# Patient Record
Sex: MALE | Race: WHITE | NOT HISPANIC OR LATINO
[De-identification: names, ages, dates, MRNs, and addresses within clinical notes are randomized per-mention and may not be internally consistent; named-entity substitution may affect disease eponyms.]

---

## 2017-01-09 ENCOUNTER — RX RENEWAL (OUTPATIENT)
Age: 72
End: 2017-01-09

## 2017-01-09 ENCOUNTER — APPOINTMENT (OUTPATIENT)
Dept: INTERNAL MEDICINE | Facility: CLINIC | Age: 72
End: 2017-01-09

## 2017-01-09 ENCOUNTER — NON-APPOINTMENT (OUTPATIENT)
Age: 72
End: 2017-01-09

## 2017-01-09 VITALS
HEART RATE: 70 BPM | RESPIRATION RATE: 16 BRPM | BODY MASS INDEX: 28.25 KG/M2 | DIASTOLIC BLOOD PRESSURE: 75 MMHG | HEIGHT: 67 IN | SYSTOLIC BLOOD PRESSURE: 130 MMHG | WEIGHT: 180 LBS

## 2017-01-09 DIAGNOSIS — R94.31 ABNORMAL ELECTROCARDIOGRAM [ECG] [EKG]: ICD-10-CM

## 2017-01-09 DIAGNOSIS — Z80.7 FAMILY HISTORY OF OTHER MALIGNANT NEOPLASMS OF LYMPHOID, HEMATOPOIETIC AND RELATED TISSUES: ICD-10-CM

## 2017-01-09 DIAGNOSIS — Z82.49 FAMILY HISTORY OF ISCHEMIC HEART DISEASE AND OTHER DISEASES OF THE CIRCULATORY SYSTEM: ICD-10-CM

## 2017-01-09 DIAGNOSIS — Z81.8 FAMILY HISTORY OF OTHER MENTAL AND BEHAVIORAL DISORDERS: ICD-10-CM

## 2017-01-09 DIAGNOSIS — Z80.1 FAMILY HISTORY OF MALIGNANT NEOPLASM OF TRACHEA, BRONCHUS AND LUNG: ICD-10-CM

## 2017-01-09 DIAGNOSIS — Z84.89 FAMILY HISTORY OF OTHER SPECIFIED CONDITIONS: ICD-10-CM

## 2017-01-10 LAB
HBA1C MFR BLD HPLC: 5.4 %
PROLACTIN SERPL-MCNC: 5.2 NG/ML

## 2017-01-13 ENCOUNTER — CLINICAL ADVICE (OUTPATIENT)
Age: 72
End: 2017-01-13

## 2017-01-13 DIAGNOSIS — E29.1 TESTICULAR HYPOFUNCTION: ICD-10-CM

## 2017-01-13 LAB
TESTOST BND SERPL-MCNC: 3.7 PG/ML
TESTOST SERPL-MCNC: 316.3 NG/DL

## 2017-02-16 DIAGNOSIS — J31.0 CHRONIC RHINITIS: ICD-10-CM

## 2017-02-27 ENCOUNTER — RX RENEWAL (OUTPATIENT)
Age: 72
End: 2017-02-27

## 2017-02-28 ENCOUNTER — RX RENEWAL (OUTPATIENT)
Age: 72
End: 2017-02-28

## 2017-03-02 ENCOUNTER — RX RENEWAL (OUTPATIENT)
Age: 72
End: 2017-03-02

## 2017-05-08 ENCOUNTER — RESULT REVIEW (OUTPATIENT)
Age: 72
End: 2017-05-08

## 2018-08-10 ENCOUNTER — MEDICATION RENEWAL (OUTPATIENT)
Age: 73
End: 2018-08-10

## 2018-08-11 LAB
25(OH)D3 SERPL-MCNC: 33.3 NG/ML
ALBUMIN SERPL ELPH-MCNC: 5.1 G/DL
ALP BLD-CCNC: 77 U/L
ALT SERPL-CCNC: 29 U/L
ANION GAP SERPL CALC-SCNC: 17 MMOL/L
APPEARANCE: CLEAR
AST SERPL-CCNC: 21 U/L
BASOPHILS # BLD AUTO: 0.02 K/UL
BASOPHILS NFR BLD AUTO: 0.4 %
BILIRUB SERPL-MCNC: 0.7 MG/DL
BILIRUBIN URINE: NEGATIVE
BLOOD URINE: NEGATIVE
BUN SERPL-MCNC: 26 MG/DL
CALCIUM SERPL-MCNC: 9.6 MG/DL
CHLORIDE SERPL-SCNC: 102 MMOL/L
CHOLEST SERPL-MCNC: 160 MG/DL
CHOLEST/HDLC SERPL: 4.4 RATIO
CO2 SERPL-SCNC: 23 MMOL/L
COLOR: YELLOW
CREAT SERPL-MCNC: 1.36 MG/DL
EOSINOPHIL # BLD AUTO: 0.08 K/UL
EOSINOPHIL NFR BLD AUTO: 1.4 %
GLUCOSE QUALITATIVE U: NEGATIVE MG/DL
GLUCOSE SERPL-MCNC: 97 MG/DL
HCT VFR BLD CALC: 45.2 %
HDLC SERPL-MCNC: 36 MG/DL
HGB BLD-MCNC: 15 G/DL
IMM GRANULOCYTES NFR BLD AUTO: 0.7 %
KETONES URINE: NEGATIVE
LDLC SERPL CALC-MCNC: 73 MG/DL
LEUKOCYTE ESTERASE URINE: NEGATIVE
LYMPHOCYTES # BLD AUTO: 1.49 K/UL
LYMPHOCYTES NFR BLD AUTO: 27 %
MAN DIFF?: NORMAL
MCHC RBC-ENTMCNC: 29.2 PG
MCHC RBC-ENTMCNC: 33.2 GM/DL
MCV RBC AUTO: 88.1 FL
MONOCYTES # BLD AUTO: 0.46 K/UL
MONOCYTES NFR BLD AUTO: 8.3 %
NEUTROPHILS # BLD AUTO: 3.43 K/UL
NEUTROPHILS NFR BLD AUTO: 62.2 %
NITRITE URINE: NEGATIVE
PH URINE: 6.5
PLATELET # BLD AUTO: 181 K/UL
POTASSIUM SERPL-SCNC: 4.7 MMOL/L
PROT SERPL-MCNC: 7.1 G/DL
PROTEIN URINE: NEGATIVE MG/DL
PSA SERPL-MCNC: 5.52 NG/ML
RBC # BLD: 5.13 M/UL
RBC # FLD: 13.8 %
SODIUM SERPL-SCNC: 142 MMOL/L
SPECIFIC GRAVITY URINE: 1.02
T4 SERPL-MCNC: 7.2 UG/DL
TRIGL SERPL-MCNC: 254 MG/DL
TSH SERPL-ACNC: 2.53 UIU/ML
UROBILINOGEN URINE: NEGATIVE MG/DL
WBC # FLD AUTO: 5.52 K/UL

## 2018-08-13 ENCOUNTER — APPOINTMENT (OUTPATIENT)
Dept: INTERNAL MEDICINE | Facility: CLINIC | Age: 73
End: 2018-08-13
Payer: COMMERCIAL

## 2018-08-13 ENCOUNTER — NON-APPOINTMENT (OUTPATIENT)
Age: 73
End: 2018-08-13

## 2018-08-13 VITALS
BODY MASS INDEX: 29.03 KG/M2 | SYSTOLIC BLOOD PRESSURE: 130 MMHG | WEIGHT: 185 LBS | HEIGHT: 67 IN | HEART RATE: 72 BPM | RESPIRATION RATE: 16 BRPM | DIASTOLIC BLOOD PRESSURE: 80 MMHG

## 2018-08-13 DIAGNOSIS — Z23 ENCOUNTER FOR IMMUNIZATION: ICD-10-CM

## 2018-08-13 PROCEDURE — 36415 COLL VENOUS BLD VENIPUNCTURE: CPT

## 2018-08-13 PROCEDURE — 99397 PER PM REEVAL EST PAT 65+ YR: CPT | Mod: 25

## 2018-08-13 PROCEDURE — 94010 BREATHING CAPACITY TEST: CPT

## 2018-08-13 PROCEDURE — 90732 PPSV23 VACC 2 YRS+ SUBQ/IM: CPT

## 2018-08-13 PROCEDURE — 99497 ADVNCD CARE PLAN 30 MIN: CPT | Mod: 25

## 2018-08-13 PROCEDURE — 93000 ELECTROCARDIOGRAM COMPLETE: CPT

## 2018-08-13 PROCEDURE — G0009: CPT

## 2018-08-13 PROCEDURE — 99213 OFFICE O/P EST LOW 20 MIN: CPT | Mod: 25

## 2018-08-13 RX ORDER — ERGOCALCIFEROL (VITAMIN D2) 10 MCG
TABLET ORAL DAILY
Refills: 0 | Status: ACTIVE | COMMUNITY
Start: 2018-08-13

## 2018-08-13 RX ORDER — CHLORHEXIDINE GLUCONATE 4 %
1000 LIQUID (ML) TOPICAL DAILY
Refills: 0 | Status: ACTIVE | COMMUNITY
Start: 2018-08-13

## 2018-08-13 RX ORDER — PYRIDOXINE HCL (VITAMIN B6) 50 MG
50 TABLET ORAL DAILY
Refills: 0 | Status: ACTIVE | COMMUNITY
Start: 2018-08-13

## 2018-08-13 NOTE — HEALTH RISK ASSESSMENT
[Good] : ~his/her~  mood as  good [No falls in past year] : Patient reported no falls in the past year [None] : None [With Family] : lives with family [Employed] : employed [Graduate School] : graduate school [] :  [Feels Safe at Home] : Feels safe at home [Fully functional (bathing, dressing, toileting, transferring, walking, feeding)] : Fully functional (bathing, dressing, toileting, transferring, walking, feeding) [Fully functional (using the telephone, shopping, preparing meals, housekeeping, doing laundry, using] : Fully functional and needs no help or supervision to perform IADLs (using the telephone, shopping, preparing meals, housekeeping, doing laundry, using transportation, managing medications and managing finances) [Smoke Detector] : smoke detector [Seat Belt] :  uses seat belt [Sunscreen] : uses sunscreen [Discussed at today's visit] : Advance Directives Discussed at today's visit [] : No [de-identified] : Socially [Change in mental status noted] : No change in mental status noted [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

## 2018-08-13 NOTE — HISTORY OF PRESENT ILLNESS
[FreeTextEntry1] : Well visit and fu of\par Hypertension  - on meds\par Hyperlipidemia - on meds\par ED  -not taking meds\par

## 2018-08-13 NOTE — REVIEW OF SYSTEMS
[Hearing Loss] : hearing loss [Nocturia] : nocturia [Negative] : Heme/Lymph [FreeTextEntry3] : last optho 6 months  -OK [FreeTextEntry7] : see HPI  - [FreeTextEntry8] : nocturia x 1-2

## 2018-08-13 NOTE — PHYSICAL EXAM
[No Acute Distress] : no acute distress [Well Nourished] : well nourished [Well Developed] : well developed [Well-Appearing] : well-appearing [Normal Sclera/Conjunctiva] : normal sclera/conjunctiva [PERRL] : pupils equal round and reactive to light [EOMI] : extraocular movements intact [Normal Outer Ear/Nose] : the outer ears and nose were normal in appearance [Normal Oropharynx] : the oropharynx was normal [Normal TMs] : both tympanic membranes were normal [No JVD] : no jugular venous distention [Supple] : supple [No Lymphadenopathy] : no lymphadenopathy [Thyroid Normal, No Nodules] : the thyroid was normal and there were no nodules present [No Respiratory Distress] : no respiratory distress  [Clear to Auscultation] : lungs were clear to auscultation bilaterally [No Accessory Muscle Use] : no accessory muscle use [Normal Rate] : normal rate  [Regular Rhythm] : with a regular rhythm [Normal S1, S2] : normal S1 and S2 [No Murmur] : no murmur heard [No Carotid Bruits] : no carotid bruits [No Abdominal Bruit] : a ~M bruit was not heard ~T in the abdomen [No Varicosities] : no varicosities [Pedal Pulses Present] : the pedal pulses are present [No Edema] : there was no peripheral edema [No Extremity Clubbing/Cyanosis] : no extremity clubbing/cyanosis [No Palpable Aorta] : no palpable aorta [Soft] : abdomen soft [Non Tender] : non-tender [Non-distended] : non-distended [No Masses] : no abdominal mass palpated [No HSM] : no HSM [Normal Bowel Sounds] : normal bowel sounds [No Hernias] : no hernias [Normal Sphincter Tone] : normal sphincter tone [No Mass] : no mass [Penis Abnormality] : normal circumcised penis [Testes Tenderness] : no tenderness of the testes [Testes Mass (___cm)] : there were no testicular masses [Prostate Tenderness] : the prostate was not tender [No Prostate Nodules] : no prostate nodules [Prostate Size ___ (0-4)] : prostate size [unfilled] (scale: 0-4) [Normal Supraclavicular Nodes] : no supraclavicular lymphadenopathy [Normal Axillary Nodes] : no axillary lymphadenopathy [Normal Posterior Cervical Nodes] : no posterior cervical lymphadenopathy [Normal Femoral Nodes] : no femoral lymphadenopathy [Normal Anterior Cervical Nodes] : no anterior cervical lymphadenopathy [Normal Inguinal Nodes] : no inguinal lymphadenopathy [No CVA Tenderness] : no CVA  tenderness [No Spinal Tenderness] : no spinal tenderness [No Joint Swelling] : no joint swelling [Grossly Normal Strength/Tone] : grossly normal strength/tone [No Rash] : no rash [Normal Gait] : normal gait [Coordination Grossly Intact] : coordination grossly intact [No Focal Deficits] : no focal deficits [Deep Tendon Reflexes (DTR)] : deep tendon reflexes were 2+ and symmetric [Speech Grossly Normal] : speech grossly normal [Memory Grossly Normal] : memory grossly normal [Normal Affect] : the affect was normal [Alert and Oriented x3] : oriented to person, place, and time [Normal Mood] : the mood was normal [Normal Insight/Judgement] : insight and judgment were intact

## 2018-08-13 NOTE — ASSESSMENT
[FreeTextEntry1] : Pt with above medical issues which requires extra work in addition to the well visit.\par Labs reviewed  -will repeat PSA and do free PSA\par May need to change Valsartan if not available on next renewal\par Health counseling given. \par

## 2018-08-14 LAB
PSA FREE FLD-MCNC: 25.5
PSA FREE SERPL-MCNC: 1.12 NG/ML
PSA SERPL-MCNC: 4.4 NG/ML

## 2018-08-30 ENCOUNTER — INPATIENT (INPATIENT)
Facility: HOSPITAL | Age: 73
LOS: 4 days | Discharge: HOME HEALTH | DRG: 871 | End: 2018-09-04
Attending: INTERNAL MEDICINE | Admitting: STUDENT IN AN ORGANIZED HEALTH CARE EDUCATION/TRAINING PROGRAM
Payer: COMMERCIAL

## 2018-08-30 VITALS
DIASTOLIC BLOOD PRESSURE: 75 MMHG | SYSTOLIC BLOOD PRESSURE: 149 MMHG | RESPIRATION RATE: 22 BRPM | TEMPERATURE: 102 F | HEART RATE: 92 BPM | OXYGEN SATURATION: 94 %

## 2018-08-30 DIAGNOSIS — I10 ESSENTIAL (PRIMARY) HYPERTENSION: ICD-10-CM

## 2018-08-30 DIAGNOSIS — K61.0 ANAL ABSCESS: Chronic | ICD-10-CM

## 2018-08-30 DIAGNOSIS — K52.9 NONINFECTIVE GASTROENTERITIS AND COLITIS, UNSPECIFIED: ICD-10-CM

## 2018-08-30 DIAGNOSIS — A41.9 SEPSIS, UNSPECIFIED ORGANISM: ICD-10-CM

## 2018-08-30 DIAGNOSIS — N18.3 CHRONIC KIDNEY DISEASE, STAGE 3 (MODERATE): ICD-10-CM

## 2018-08-30 DIAGNOSIS — J18.9 PNEUMONIA, UNSPECIFIED ORGANISM: ICD-10-CM

## 2018-08-30 DIAGNOSIS — Z90.49 ACQUIRED ABSENCE OF OTHER SPECIFIED PARTS OF DIGESTIVE TRACT: Chronic | ICD-10-CM

## 2018-08-30 DIAGNOSIS — Z29.9 ENCOUNTER FOR PROPHYLACTIC MEASURES, UNSPECIFIED: ICD-10-CM

## 2018-08-30 DIAGNOSIS — E78.5 HYPERLIPIDEMIA, UNSPECIFIED: ICD-10-CM

## 2018-08-30 LAB
ALBUMIN SERPL ELPH-MCNC: 4.4 G/DL — SIGNIFICANT CHANGE UP (ref 3.3–5)
ALP SERPL-CCNC: 79 U/L — SIGNIFICANT CHANGE UP (ref 40–120)
ALT FLD-CCNC: 75 U/L — HIGH (ref 10–45)
ANION GAP SERPL CALC-SCNC: 16 MMOL/L — SIGNIFICANT CHANGE UP (ref 5–17)
APPEARANCE UR: ABNORMAL
AST SERPL-CCNC: 56 U/L — HIGH (ref 10–40)
BACTERIA # UR AUTO: ABNORMAL /HPF
BASOPHILS # BLD AUTO: 0 K/UL — SIGNIFICANT CHANGE UP (ref 0–0.2)
BASOPHILS NFR BLD AUTO: 0.1 % — SIGNIFICANT CHANGE UP (ref 0–2)
BILIRUB SERPL-MCNC: 1.2 MG/DL — SIGNIFICANT CHANGE UP (ref 0.2–1.2)
BILIRUB UR-MCNC: NEGATIVE — SIGNIFICANT CHANGE UP
BUN SERPL-MCNC: 25 MG/DL — HIGH (ref 7–23)
CALCIUM SERPL-MCNC: 9.7 MG/DL — SIGNIFICANT CHANGE UP (ref 8.4–10.5)
CHLORIDE SERPL-SCNC: 93 MMOL/L — LOW (ref 96–108)
CO2 SERPL-SCNC: 24 MMOL/L — SIGNIFICANT CHANGE UP (ref 22–31)
COLOR SPEC: YELLOW — SIGNIFICANT CHANGE UP
CREAT SERPL-MCNC: 1.78 MG/DL — HIGH (ref 0.5–1.3)
DIFF PNL FLD: ABNORMAL
EOSINOPHIL # BLD AUTO: 0 K/UL — SIGNIFICANT CHANGE UP (ref 0–0.5)
EOSINOPHIL NFR BLD AUTO: 0.1 % — SIGNIFICANT CHANGE UP (ref 0–6)
GAS PNL BLDV: SIGNIFICANT CHANGE UP
GAS PNL BLDV: SIGNIFICANT CHANGE UP
GLUCOSE SERPL-MCNC: 126 MG/DL — HIGH (ref 70–99)
GLUCOSE UR QL: NEGATIVE — SIGNIFICANT CHANGE UP
GRAN CASTS # UR COMP ASSIST: ABNORMAL
HCT VFR BLD CALC: 44.6 % — SIGNIFICANT CHANGE UP (ref 39–50)
HGB BLD-MCNC: 15 G/DL — SIGNIFICANT CHANGE UP (ref 13–17)
KETONES UR-MCNC: NEGATIVE — SIGNIFICANT CHANGE UP
LEUKOCYTE ESTERASE UR-ACNC: NEGATIVE — SIGNIFICANT CHANGE UP
LYMPHOCYTES # BLD AUTO: 1 K/UL — SIGNIFICANT CHANGE UP (ref 1–3.3)
LYMPHOCYTES # BLD AUTO: 8 % — LOW (ref 13–44)
MCHC RBC-ENTMCNC: 28.8 PG — SIGNIFICANT CHANGE UP (ref 27–34)
MCHC RBC-ENTMCNC: 33.7 GM/DL — SIGNIFICANT CHANGE UP (ref 32–36)
MCV RBC AUTO: 85.5 FL — SIGNIFICANT CHANGE UP (ref 80–100)
MONOCYTES # BLD AUTO: 1.2 K/UL — HIGH (ref 0–0.9)
MONOCYTES NFR BLD AUTO: 9.8 % — SIGNIFICANT CHANGE UP (ref 2–14)
NEUTROPHILS # BLD AUTO: 10.4 K/UL — HIGH (ref 1.8–7.4)
NEUTROPHILS NFR BLD AUTO: 81.9 % — HIGH (ref 43–77)
NITRITE UR-MCNC: NEGATIVE — SIGNIFICANT CHANGE UP
PH UR: 6 — SIGNIFICANT CHANGE UP (ref 5–8)
PLATELET # BLD AUTO: 205 K/UL — SIGNIFICANT CHANGE UP (ref 150–400)
POTASSIUM SERPL-MCNC: 4.4 MMOL/L — SIGNIFICANT CHANGE UP (ref 3.5–5.3)
POTASSIUM SERPL-SCNC: 4.4 MMOL/L — SIGNIFICANT CHANGE UP (ref 3.5–5.3)
PROT SERPL-MCNC: 8.3 G/DL — SIGNIFICANT CHANGE UP (ref 6–8.3)
PROT UR-MCNC: 100 MG/DL
RBC # BLD: 5.21 M/UL — SIGNIFICANT CHANGE UP (ref 4.2–5.8)
RBC # FLD: 12.9 % — SIGNIFICANT CHANGE UP (ref 10.3–14.5)
RBC CASTS # UR COMP ASSIST: SIGNIFICANT CHANGE UP /HPF (ref 0–2)
SODIUM SERPL-SCNC: 133 MMOL/L — LOW (ref 135–145)
SP GR SPEC: 1.02 — SIGNIFICANT CHANGE UP (ref 1.01–1.02)
TROPONIN T, HIGH SENSITIVITY RESULT: 14 NG/L — SIGNIFICANT CHANGE UP (ref 0–51)
UROBILINOGEN FLD QL: NEGATIVE — SIGNIFICANT CHANGE UP
WBC # BLD: 12.7 K/UL — HIGH (ref 3.8–10.5)
WBC # FLD AUTO: 12.7 K/UL — HIGH (ref 3.8–10.5)
WBC UR QL: SIGNIFICANT CHANGE UP /HPF (ref 0–5)

## 2018-08-30 PROCEDURE — 71045 X-RAY EXAM CHEST 1 VIEW: CPT | Mod: 26

## 2018-08-30 PROCEDURE — 99254 IP/OBS CNSLTJ NEW/EST MOD 60: CPT | Mod: GC

## 2018-08-30 PROCEDURE — 76705 ECHO EXAM OF ABDOMEN: CPT | Mod: 26,RT

## 2018-08-30 PROCEDURE — 74176 CT ABD & PELVIS W/O CONTRAST: CPT | Mod: 26

## 2018-08-30 PROCEDURE — 74018 RADEX ABDOMEN 1 VIEW: CPT | Mod: 26

## 2018-08-30 PROCEDURE — 99285 EMERGENCY DEPT VISIT HI MDM: CPT

## 2018-08-30 PROCEDURE — 99223 1ST HOSP IP/OBS HIGH 75: CPT | Mod: GC

## 2018-08-30 RX ORDER — MESALAMINE 400 MG
4 TABLET, DELAYED RELEASE (ENTERIC COATED) ORAL
Qty: 0 | Refills: 0 | COMMUNITY

## 2018-08-30 RX ORDER — IPRATROPIUM/ALBUTEROL SULFATE 18-103MCG
3 AEROSOL WITH ADAPTER (GRAM) INHALATION EVERY 6 HOURS
Qty: 0 | Refills: 0 | Status: DISCONTINUED | OUTPATIENT
Start: 2018-08-30 | End: 2018-09-04

## 2018-08-30 RX ORDER — ACETAMINOPHEN 500 MG
650 TABLET ORAL ONCE
Qty: 0 | Refills: 0 | Status: COMPLETED | OUTPATIENT
Start: 2018-08-30 | End: 2018-08-30

## 2018-08-30 RX ORDER — ACETAMINOPHEN 500 MG
650 TABLET ORAL ONCE
Qty: 0 | Refills: 0 | Status: DISCONTINUED | OUTPATIENT
Start: 2018-08-30 | End: 2018-08-30

## 2018-08-30 RX ORDER — SODIUM CHLORIDE 9 MG/ML
1000 INJECTION INTRAMUSCULAR; INTRAVENOUS; SUBCUTANEOUS ONCE
Qty: 0 | Refills: 0 | Status: COMPLETED | OUTPATIENT
Start: 2018-08-30 | End: 2018-08-30

## 2018-08-30 RX ORDER — SODIUM CHLORIDE 9 MG/ML
1000 INJECTION INTRAMUSCULAR; INTRAVENOUS; SUBCUTANEOUS
Qty: 0 | Refills: 0 | Status: DISCONTINUED | OUTPATIENT
Start: 2018-08-30 | End: 2018-08-31

## 2018-08-30 RX ORDER — HEPARIN SODIUM 5000 [USP'U]/ML
5000 INJECTION INTRAVENOUS; SUBCUTANEOUS EVERY 8 HOURS
Qty: 0 | Refills: 0 | Status: DISCONTINUED | OUTPATIENT
Start: 2018-08-30 | End: 2018-08-30

## 2018-08-30 RX ORDER — AZITHROMYCIN 500 MG/1
500 TABLET, FILM COATED ORAL EVERY 24 HOURS
Qty: 0 | Refills: 0 | Status: DISCONTINUED | OUTPATIENT
Start: 2018-08-30 | End: 2018-09-04

## 2018-08-30 RX ORDER — PIPERACILLIN AND TAZOBACTAM 4; .5 G/20ML; G/20ML
3.38 INJECTION, POWDER, LYOPHILIZED, FOR SOLUTION INTRAVENOUS EVERY 8 HOURS
Qty: 0 | Refills: 0 | Status: DISCONTINUED | OUTPATIENT
Start: 2018-08-30 | End: 2018-08-31

## 2018-08-30 RX ORDER — ENOXAPARIN SODIUM 100 MG/ML
30 INJECTION SUBCUTANEOUS DAILY
Qty: 0 | Refills: 0 | Status: DISCONTINUED | OUTPATIENT
Start: 2018-08-30 | End: 2018-09-04

## 2018-08-30 RX ORDER — FAMOTIDINE 10 MG/ML
20 INJECTION INTRAVENOUS ONCE
Qty: 0 | Refills: 0 | Status: COMPLETED | OUTPATIENT
Start: 2018-08-30 | End: 2018-08-30

## 2018-08-30 RX ORDER — KETOROLAC TROMETHAMINE 30 MG/ML
15 SYRINGE (ML) INJECTION ONCE
Qty: 0 | Refills: 0 | Status: DISCONTINUED | OUTPATIENT
Start: 2018-08-30 | End: 2018-08-30

## 2018-08-30 RX ORDER — MESALAMINE 400 MG
400 TABLET, DELAYED RELEASE (ENTERIC COATED) ORAL
Qty: 0 | Refills: 0 | Status: DISCONTINUED | OUTPATIENT
Start: 2018-08-30 | End: 2018-08-30

## 2018-08-30 RX ORDER — ATORVASTATIN CALCIUM 80 MG/1
20 TABLET, FILM COATED ORAL AT BEDTIME
Qty: 0 | Refills: 0 | Status: DISCONTINUED | OUTPATIENT
Start: 2018-08-30 | End: 2018-09-04

## 2018-08-30 RX ORDER — ALLOPURINOL 300 MG
100 TABLET ORAL DAILY
Qty: 0 | Refills: 0 | Status: DISCONTINUED | OUTPATIENT
Start: 2018-08-30 | End: 2018-09-04

## 2018-08-30 RX ORDER — POTASSIUM CITRATE MONOHYDRATE 100 %
10 POWDER (GRAM) MISCELLANEOUS THREE TIMES A DAY
Qty: 0 | Refills: 0 | Status: DISCONTINUED | OUTPATIENT
Start: 2018-08-30 | End: 2018-08-31

## 2018-08-30 RX ORDER — ONDANSETRON 8 MG/1
4 TABLET, FILM COATED ORAL ONCE
Qty: 0 | Refills: 0 | Status: COMPLETED | OUTPATIENT
Start: 2018-08-30 | End: 2018-08-30

## 2018-08-30 RX ORDER — MESALAMINE 400 MG
0.38 TABLET, DELAYED RELEASE (ENTERIC COATED) ORAL
Qty: 0 | Refills: 0 | Status: DISCONTINUED | OUTPATIENT
Start: 2018-08-30 | End: 2018-08-31

## 2018-08-30 RX ORDER — PIPERACILLIN AND TAZOBACTAM 4; .5 G/20ML; G/20ML
3.38 INJECTION, POWDER, LYOPHILIZED, FOR SOLUTION INTRAVENOUS ONCE
Qty: 0 | Refills: 0 | Status: COMPLETED | OUTPATIENT
Start: 2018-08-30 | End: 2018-08-30

## 2018-08-30 RX ORDER — VANCOMYCIN HCL 1 G
1000 VIAL (EA) INTRAVENOUS ONCE
Qty: 0 | Refills: 0 | Status: DISCONTINUED | OUTPATIENT
Start: 2018-08-30 | End: 2018-08-30

## 2018-08-30 RX ADMIN — SODIUM CHLORIDE 1000 MILLILITER(S): 9 INJECTION INTRAMUSCULAR; INTRAVENOUS; SUBCUTANEOUS at 14:25

## 2018-08-30 RX ADMIN — ONDANSETRON 4 MILLIGRAM(S): 8 TABLET, FILM COATED ORAL at 14:53

## 2018-08-30 RX ADMIN — SODIUM CHLORIDE 1000 MILLILITER(S): 9 INJECTION INTRAMUSCULAR; INTRAVENOUS; SUBCUTANEOUS at 15:30

## 2018-08-30 RX ADMIN — FAMOTIDINE 20 MILLIGRAM(S): 10 INJECTION INTRAVENOUS at 14:53

## 2018-08-30 RX ADMIN — Medication 650 MILLIGRAM(S): at 21:10

## 2018-08-30 RX ADMIN — AZITHROMYCIN 250 MILLIGRAM(S): 500 TABLET, FILM COATED ORAL at 21:10

## 2018-08-30 RX ADMIN — Medication 650 MILLIGRAM(S): at 15:00

## 2018-08-30 RX ADMIN — PIPERACILLIN AND TAZOBACTAM 200 GRAM(S): 4; .5 INJECTION, POWDER, LYOPHILIZED, FOR SOLUTION INTRAVENOUS at 16:16

## 2018-08-30 NOTE — ED PROVIDER NOTE - MEDICAL DECISION MAKING DETAILS
PGY1/MD Mariah. 73M well appearing, febrile, tachycardic. Consider infection (colitis, uti, pneumonia), sepsis. Plan for labs, blood cx, cxr, u/a, trop, abx, reassess. PGY1/MD Mariah. 73M well appearing, febrile, tachycardic. Consider infection (colitis, uti, pneumonia), sepsis. Plan for labs, blood cx, cxr, u/a, trop, abx, reassess. ID consultation, likely Legionella pneumonia with GI symptoms or IBD symptoms + colitis, with Bacterial (such as S. Pneumonia, S. Aureus) pneumonia secondarily to virus. Admission for sepsis. PGY1/MD Mariah. 73M well appearing, febrile, tachycardic. Consider infection (colitis, uti, pneumonia), sepsis. Plan for labs, blood cx, cxr, u/a, trop, abx, reassess. ID consultation, likely Legionella pneumonia with GI symptoms or symptomatic IBD due to colitis with Bacterial (such as S. Pneumonia, S. Aureus) pneumonia secondarily to virus. Admission for sepsis. PGY1/MD Mariah. 73M well appearing, febrile, tachycardic. Consider infection (colitis, uti, pneumonia), sepsis. Plan for labs, blood cx, cxr, u/a, trop, abx, reassess. ID consultation, likely Legionella pneumonia with GI symptoms or symptomatic IBD due to colitis with Bacterial (such as S. Pneumonia, S. Aureus) pneumonia secondarily to virus. Admission for sepsis.    72 y/o M presenting with fever, tachycardia and diarrhea. DDx: colitis, gastroenteritis, pneumonia. Will order labs, give IV fluids and reavaluate.  ATTG: Dr. Rosales

## 2018-08-30 NOTE — H&P ADULT - PROBLEM SELECTOR PLAN 1
Standing Duoneb   Incentive Spirometry   Appreciate ID reccs pt met criteria for sepsis, Tm 102.3, Hr 102, RR 22 likely 2/2 RLL/LLL PNA  CXR sig for LLL PNA, CT abd sig RLL  Send blood, urine clx, urine legionella   Continue Zosyn and Azithromycin   Standing Duoneb   Incentive Spirometry   Appreciate ID reccs Pt met criteria for sepsis on admission, Tm 102.3, Hr 102, RR 22 likely 2/2 RLL/LLL PNA  CXR sig for LLL PNA, CT abd sig RLL  Send blood, urine clx, urine legionella   Continue Zosyn and Azithromycin   Standing Duoneb   Incentive Spirometry   Appreciate ID reccs

## 2018-08-30 NOTE — H&P ADULT - NSHPLABSRESULTS_GEN_ALL_CORE
15.0   12.7  )-----------( 205      ( 30 Aug 2018 14:53 )             44.6         133<L>  |  93<L>  |  25<H>  ----------------------------<  126<H>  4.4   |  24  |  1.78<H>    Ca    9.7      30 Aug 2018 14:53    TPro  8.3  /  Alb  4.4  /  TBili  1.2  /  DBili  x   /  AST  56<H>  /  ALT  75<H>  /  AlkPhos  79            LIVER FUNCTIONS - ( 30 Aug 2018 14:53 )  Alb: 4.4 g/dL / Pro: 8.3 g/dL / ALK PHOS: 79 U/L / ALT: 75 U/L / AST: 56 U/L / GGT: x           Urinalysis Basic - ( 30 Aug 2018 16:12 )    Color: Yellow / Appearance: SL Turbid / S.017 / pH: x  Gluc: x / Ketone: Negative  / Bili: Negative / Urobili: Negative   Blood: x / Protein: 100 mg/dL / Nitrite: Negative   Leuk Esterase: Negative / RBC: 3-5 /HPF / WBC 0-2 /HPF   Sq Epi: x / Non Sq Epi: x / Bacteria: Few /HPF    < from: Xray Chest 1 View- PORTABLE-Urgent (18 @ 17:23) >    INTERPRETATION:  left lower lung consolidation, likely pneumonia    < end of copied text >    < from: Xray Abdomen 1 View PORTABLE -Urgent (18 @ 17:27) >    INTERPRETATION:  no emergent findings    < end of copied text > 15.0   12.7  )-----------( 205      ( 30 Aug 2018 14:53 )             44.6         133<L>  |  93<L>  |  25<H>  ----------------------------<  126<H>  4.4   |  24  |  1.78<H>    Ca    9.7      30 Aug 2018 14:53    TPro  8.3  /  Alb  4.4  /  TBili  1.2  /  DBili  x   /  AST  56<H>  /  ALT  75<H>  /  AlkPhos  79            LIVER FUNCTIONS - ( 30 Aug 2018 14:53 )  Alb: 4.4 g/dL / Pro: 8.3 g/dL / ALK PHOS: 79 U/L / ALT: 75 U/L / AST: 56 U/L / GGT: x           Urinalysis Basic - ( 30 Aug 2018 16:12 )    Color: Yellow / Appearance: SL Turbid / S.017 / pH: x  Gluc: x / Ketone: Negative  / Bili: Negative / Urobili: Negative   Blood: x / Protein: 100 mg/dL / Nitrite: Negative   Leuk Esterase: Negative / RBC: 3-5 /HPF / WBC 0-2 /HPF   Sq Epi: x / Non Sq Epi: x / Bacteria: Few /HPF    < from: Xray Chest 1 View- PORTABLE-Urgent (18 @ 17:23) >    INTERPRETATION:  left lower lung consolidation, likely pneumonia    < from: Xray Abdomen 1 View PORTABLE -Urgent (18 @ 17:27) >    INTERPRETATION:  no emergent findings     I personally reviewed & interpreted the lab findings above; CBC c/w leukocytosis with neutrophil predominance. CMP c/w hyponatremia and transaminitis.   I personally reviewed & interpreted the radiographic findings; CXR c/w right lower lobe pneumonia   I personally reviewed & interpreted the EKG findings; T-wave inversion in lateral leads are not new and present on prior EKG from .

## 2018-08-30 NOTE — H&P ADULT - PROBLEM SELECTOR PLAN 2
On initial presentation, pt met criteria for sepsis, Tm 102.3, Hr 102, RR 22 likely 2/2 RLL/LLL PNA  vs intraabdominal infection. Strong suspicion for legionella given constellation findings PNA, diarrhea, mild hyponatremia  F/U blood and urine clx   Send RVP  Send urine legionella  Send GI PCR, C diff   Abx w Zosyn and Azithromycin, low suspicion for MRSA s/p 1 dose Vancomycin   CXR w LLL  CT abd negative for emergent abd findings, however sig RLL PNA On initial presentation, pt met criteria for sepsis, Tm 102.3, Hr 102, RR 22 likely 2/2 RLL/LLL PNA  vs intraabdominal infection. Strong suspicion for legionella given constellation findings PNA, diarrhea, mild hyponatremia  F/U blood and urine clx   Send RVP  Send urine legionella  Send GI PCR, C diff   Abx w Zosyn and Azithromycin, low suspicion for MRSA s/p 1 dose Vancomycin   IVF   CXR w LLL  CT abd negative for emergent abd findings, however sig RLL PNA  Admit to telemetry  Contact isolation

## 2018-08-30 NOTE — ED ADULT NURSE REASSESSMENT NOTE - NS ED NURSE REASSESS COMMENT FT1
Mariah MONTE states d/c vancomycin. Repeat VBG drawn and sent to lab. Pt admitted to medicine with contact precautions. Scheduled for CT at 2000. Pt still febrile to 103F orally, awaiting next available tylenol dose. Pt states he is comfortable.

## 2018-08-30 NOTE — H&P ADULT - ASSESSMENT
73M Pediatric Cardiologist, [facility] HTN, HLD, CKD, IBD on mesalamine, presenting w subjective fevers, chill, diarrhea for 3 days, found to be septic likely 2/2 RLL Pneumonia.

## 2018-08-30 NOTE — ED PROVIDER NOTE - PROGRESS NOTE DETAILS
PCP Dr. Medina verified that Cre on Aug 02 was 1.36. Also called ID 20 min ago, no response yet. Ideal BW is assessed as 70kg, fluid resuscitation has been given as the protocol. Reassessment of lactate level, cap-refill time, vital signs, mental status, urine output were performed.

## 2018-08-30 NOTE — CHART NOTE - NSCHARTNOTEFT_GEN_A_CORE
TO BE COMPLETED WITHIN 6 HOURS OF INITIAL ASSESSMENT:    For use in patients that have 2 sepsis criteria and new organ dysfunction   •	New or increased oxygen requirement  •	Creatinine >2mg/dL  •	Bilirubin>2mg/dL  •	Platelet <100,00/mm3  •	INR >1.5, PTT>60  •	Lactate >2    If patient persistent hypotension (SBP<90) or any lactate >4 then provider evaluation (Physician/PA/NP) within 30 minutes of bolus completion is required.    Vital Signs Last 24 Hrs  T(C): 38.7 (30 Aug 2018 16:08), Max: 39.1 (30 Aug 2018 14:05)  T(F): 101.7 (30 Aug 2018 16:08), Max: 102.3 (30 Aug 2018 14:05)  HR: 84 (30 Aug 2018 16:08) (84 - 102)  BP: 133/62 (30 Aug 2018 16:08) (133/62 - 168/79)  BP(mean): --  RR: 18 (30 Aug 2018 16:08) (17 - 22)  SpO2: 95% (30 Aug 2018 16:08) (94% - 100%)  		  LUNGS:  [  ]Clear bilaterally [  ] Wheeze [  ] Rhonchi [  ] Rales [  ] Crackles; Other:  HEART: [  ]RRR [  ] No murmur[  ]  Normal S1S2[  ] Tachycardia;  Other:  CAPILLARY REFULL:  	Fingers: [  ] less than 2 seconds [  ] more than 2 seconds                                           Toes: [  ]  less than 2 seconds [  ] more than 2 seconds   PERIPHERAL PULSES:  Radial: [  ] Palpable  [  ]  non-palpable                                         Dorsalis Pedis: [  ] Palpable  [  ] non-palpable                                         Posterior Tibial: [  ] Palpable  [  ] non-palpable                                          Other:  SKIN:   [  ]  Diaphoretic  [  ]  mottling  [  ]  Cold extremities  [  ]  Warm [  ]  Dry                      Other:    BEDSIDE ULTRASOUND FINDINGS (IF APPLICABLE):    Labs:  30 Aug 2018 14:53    133    |  93     |  25     ----------------------------<  126    4.4     |  24     |  1.78     Ca    9.7        30 Aug 2018 14:53    TPro  8.3    /  Alb  4.4    /  TBili  1.2    /  DBili  x      /  AST  56     /  ALT  75     /  AlkPhos  79     30 Aug 2018 14:53                          15.0   12.7  )-----------( 205      ( 30 Aug 2018 14:53 )             44.6       Lactate:    Plan (orders must be placed in EMR):     [x]  Check Repeat Lactate   [  ]  No change in current plan  [  ]  Start Vasopressors:  [  ]  Repeat Fluid Bolus:  [  ] other:    Care Discussed with Consultants/Other Providers [ ] YES  [ ] NO TO BE COMPLETED WITHIN 6 HOURS OF INITIAL ASSESSMENT:    For use in patients that have 2 sepsis criteria and new organ dysfunction   •	New or increased oxygen requirement  •	Creatinine >2mg/dL  •	Bilirubin>2mg/dL  •	Platelet <100,00/mm3  •	INR >1.5, PTT>60  •	Lactate >2    If patient persistent hypotension (SBP<90) or any lactate >4 then provider evaluation (Physician/PA/NP) within 30 minutes of bolus completion is required.    Vital Signs Last 24 Hrs  T(C): 38.7 (30 Aug 2018 16:08), Max: 39.1 (30 Aug 2018 14:05)  T(F): 101.7 (30 Aug 2018 16:08), Max: 102.3 (30 Aug 2018 14:05)  HR: 84 (30 Aug 2018 16:08) (84 - 102)  BP: 133/62 (30 Aug 2018 16:08) (133/62 - 168/79)  BP(mean): --  RR: 18 (30 Aug 2018 16:08) (17 - 22)  SpO2: 95% (30 Aug 2018 16:08) (94% - 100%)  		  LUNGS:  [x]Clear bilaterally [  ] Wheeze [  ] Rhonchi [  ] Rales [  ] Crackles; Other:  HEART: [x]RRR [  ] No murmur[  ]  Normal S1S2[x] Tachycardia;  Other:  CAPILLARY REFULL:  	Fingers: [  ] less than 2 seconds [x] more than 2 seconds                                           Toes: [  ]  less than 2 seconds [  ] more than 2 seconds   PERIPHERAL PULSES:  Radial: [x] Palpable  [  ]  non-palpable                                         Dorsalis Pedis: [  ] Palpable  [  ] non-palpable                                         Posterior Tibial: [  ] Palpable  [  ] non-palpable                                          Other:  SKIN:   [  ]  Diaphoretic  [  ]  mottling  [  ]  Cold extremities  [x]  Warm [x]  Dry                      Other:    BEDSIDE ULTRASOUND FINDINGS (IF APPLICABLE):    Labs:  30 Aug 2018 14:53    133    |  93     |  25     ----------------------------<  126    4.4     |  24     |  1.78     Ca    9.7        30 Aug 2018 14:53    TPro  8.3    /  Alb  4.4    /  TBili  1.2    /  DBili  x      /  AST  56     /  ALT  75     /  AlkPhos  79     30 Aug 2018 14:53                          15.0   12.7  )-----------( 205      ( 30 Aug 2018 14:53 )             44.6       Lactate:    Plan (orders must be placed in EMR):     [x]  Check Repeat Lactate   [  ]  No change in current plan  [  ]  Start Vasopressors:  [  ]  Repeat Fluid Bolus:  [  ] other:    Care Discussed with Consultants/Other Providers [ ] YES  [ ] NO

## 2018-08-30 NOTE — H&P ADULT - ATTENDING COMMENTS
Patient assigned to me by night hospitalist in charge for management and care for patient for this evening only. Care to be resumed by day hospitalist in the morning and thereafter.     Patient seen and examined at bedside. Agree with resident note above. Changes made to note above where appropriate.

## 2018-08-30 NOTE — CONSULT NOTE ADULT - ATTENDING COMMENTS
73M with IBD, HTN, CKD (baseline cr 1.8-1.9 per pt), BPH, hx nephrolithiasis presenting with diarrhea, fever, and chills.    Recent trip to [place] for a wedding Sunday Monday went to [place] for vacation  Started to become ill on Monday   Febrile  Leukocytosis neutrophilic predominant  c/o diarrhea, fever, and chills  No recent abx use  CXR with PNA on preliminary read  Elevated LFTs  Known CKD  Hyponatremia  Possible infectious source - GI vs PNA - possibly Legionella given hyponatremia, diarrhea, cxr findings    Recommend:  -Would continue zosyn for now  -Can discontinue vancomycin  -Start azithromycin 500 mg IV q 24 hours  -F/U official read of CXR  -F/U stool studies   -Check urine legionella antigen  -F/U blood cxs.    Geo Flores MD  Pager [phone]  After 5pm/weekends call [phone] 73M with IBD, HTN, CKD (baseline cr 1.8-1.9 per pt), BPH, hx nephrolithiasis presenting with diarrhea, fever, and chills.    Recent trip to [place] for a wedding Sunday Monday went to [place] for vacation  Started to become ill on Monday   Febrile  Leukocytosis neutrophilic predominant  c/o diarrhea, fever, and chills  No recent abx use  CXR with PNA on preliminary read  Elevated LFTs  Known CKD  Hyponatremia  Possible infectious source - GI vs PNA - possibly Legionella given hyponatremia, diarrhea, cxr findings    Recommend:  -Would continue zosyn for now  -Can discontinue vancomycin  -Start azithromycin 500 mg IV q 24 hours  -F/U official read of CXR  -F/U stool studies   -Check urine legionella antigen  -F/U blood cxs.  -Check RVP    Geo Flores MD  Pager [phone]  After 5pm/weekends call [phone]

## 2018-08-30 NOTE — ED PROVIDER NOTE - NS ED ROS FT
PGY1/MD Mariah.   CONST: +fevers, +chills, no trauma  EYES: no pain, no visual disturbances  ENT: no sore throat, no epistaxis, no rhinorrhea, no hearing changes  CV: +chest pain, no palpitations, no orthopnea, no extremity pain or swelling  RESP: no shortness of breath, +cough, no sputum, no pleurisy, no wheezing  ABD: no abdominal pain, +nausea, +vomiting, +diarrhea, no black or bloody stool  : no dysuria, no hematuria, +frequency, no urgency  MSK: no back pain, no neck pain, no extremity pain  NEURO: +headache, no sensory disturbances, no focal weakness, no dizziness  HEME: no easy bleeding or bruising  SKIN: no diaphoresis, no rash

## 2018-08-30 NOTE — H&P ADULT - NSHPPHYSICALEXAM_GEN_ALL_CORE
General: Elderly male resting in bed in moderate discomfort   HEENT: No lymphadenopathy. Dry mucous membranes. EOMI. PERRLA  Cardio: S1/S2. No murmurs   Pulm: Bronchial breath sounds. ? Rhonchi. No wheeze. No consolidation  GI: Central obesity. Soft, nontender, nondistended   : No albert  Ext: No deformity   Vasc: 2+ pulses b/l  Neuro: Non-focal  Pysch: AAO X3, appropriate PHYSICAL EXAM:  Vital Signs Last 24 Hrs  T(C): 37.7 (08-31-18 @ 06:13)  T(F): 99.8 (08-31-18 @ 06:13), Max: 103 (08-30-18 @ 18:46)  HR: 79 (08-31-18 @ 06:13) (66 - 102)  BP: 130/75 (08-31-18 @ 06:13)  BP(mean): --  RR: 18 (08-31-18 @ 06:13) (17 - 22)  SpO2: 95% (08-31-18 @ 06:13) (94% - 100%)  Wt(kg): --    Constitutional: NAD, awake and alert  EYES: EOMI  ENT:  Normal Hearing, no tonsillar exudates   Neck: Soft and supple , no thyromegaly   Respiratory: No distress but unable to take deep breath without coughing so this limits majority of lung exam however pt has rhonchi in both lung fields.   Cardiovascular: S1 and S2, regular rate and rhythm, no Murmurs, gallops or rubs, no JVD,    Gastrointestinal: Bowel Sounds present, soft, nontender, nondistended, no guarding, no rebound  Extremities: No cyanosis or clubbing; warm to touch  Vascular: 2+ peripheral pulses lower ex  Neurological: No focal deficits, CN II-XII intact bilaterally, sensation to light touch intact in all extremities, gait deferred. Pupils are equally reactive to light and symmetrical in size.   Musculoskeletal: 5/5 strength b/l upper and lower extremities; no joint swelling.  Skin: No rashes  Psych: no depression or anhedonia, AAOx3  HEME: no bruises, no nose bleeds

## 2018-08-30 NOTE — ED ADULT NURSE NOTE - NSIMPLEMENTINTERV_GEN_ALL_ED
Implemented All Universal Safety Interventions:  [place] to call system. Call bell, personal items and telephone within reach. Instruct patient to call for assistance. Room bathroom lighting operational. Non-slip footwear when patient is off stretcher. Physically safe environment: no spills, clutter or unnecessary equipment. Stretcher in lowest position, wheels locked, appropriate side rails in place.

## 2018-08-30 NOTE — ED PROVIDER NOTE - CARE PLAN
Principal Discharge DX:	Sepsis  Secondary Diagnosis:	Colitis Principal Discharge DX:	Sepsis  Secondary Diagnosis:	Pneumonia of right lower lobe due to infectious organism  Secondary Diagnosis:	Colitis

## 2018-08-30 NOTE — H&P ADULT - FAMILY HISTORY
Mother  Still living? Unknown  Family history of dementia, Age at diagnosis: Age Unknown     Father  Still living? Unknown  Family history of lung cancer, Age at diagnosis: Age Unknown

## 2018-08-30 NOTE — H&P ADULT - PMH
Cervicalgia    HLD (hyperlipidemia)    HTN (hypertension)    IBD (inflammatory bowel disease) Cervicalgia    Elevated PSA    HLD (hyperlipidemia)    HTN (hypertension)    IBD (inflammatory bowel disease)

## 2018-08-30 NOTE — H&P ADULT - NSHPREVIEWOFSYSTEMS_GEN_ALL_CORE
CONSTITUTIONAL: + weakness, fevers or chills  EYES/ENT: No visual changes;  No vertigo or throat pain   NECK: No pain or stiffness  RESPIRATORY: No wheezing, hemoptysis; No shortness of breath. + cough  CARDIOVASCULAR: No palpitations. Intermittent chest pain  GASTROINTESTINAL: No abdominal or epigastric pain. No vomiting, or hematemesis; No melena or hematochezia. +nausea, retching. + diarrhea   GENITOURINARY: No dysuria, frequency or hematuria  NEUROLOGICAL: No numbness or weakness  PYSCH: No behavioral changes   SKIN: No itching, burning, rashes, or lesions   All other review of systems is negative unless indicated above. CONSTITUTIONAL: + weakness, fever and chills.   EYES/ENT: No visual changes;  No vertigo or throat pain   NECK: No pain or stiffness  RESPIRATORY: No wheezing, hemoptysis; No shortness of breath. + cough  CARDIOVASCULAR: No palpitations. chest pain only during heavy coughs.   GASTROINTESTINAL: No abdominal or epigastric pain. No vomiting, or hematemesis; No melena or hematochezia. +nausea, retching. + diarrhea   GENITOURINARY: No dysuria, frequency or hematuria  NEUROLOGICAL: No numbness or syncope   PYSCH: No behavioral changes   SKIN: No itching, burning, rashes, or lesions   All other review of systems is negative unless indicated above.

## 2018-08-30 NOTE — ED PROVIDER NOTE - PHYSICAL EXAMINATION
PGY1/MD Mariah.   VITALS: reviewed  GEN: No apparent distress, A & O x 4  HEAD/EYES: NC/AT, PERRL, EOMI, anicteric sclerae, no conjunctival pallor  ENT: mucus membranes moist, oropharynx WNL, trachea midline, no JVD, neck is supple  RESP: lungs CTA with equal breath sounds bilaterally, chest wall nontender and atraumatic  CV: heart with reg rhythm S1, S2, no murmur; distal pulses intact and symmetric bilaterally  ABDOMEN: normoactive bowel sounds, soft, nondistended, nontender, no palpable masses  : no CVAT  MSK: extremities atraumatic and nontender, no edema, no asymmetry. the back is without midline or lateral tenderness, there is no spinal deformity or stepoff and the back is ranged painlessly. the neck has no midline tenderness, deformity, or stepoff, and is ranged painlessly.  SKIN: warm, dry, no rash, no bruising, no cyanosis. color appropriate for ethnicity  NEURO: alert, mentating appropriately, no facial asymmetry.  PSYCH: Affect appropriate

## 2018-08-30 NOTE — H&P ADULT - PROBLEM SELECTOR PLAN 3
Hx of IBD, on Mesalamine Hx of IBD on Mesalamine, currently low suspicion for flare, however ddx includes obstruction, abscess  Will f/u CT abd w oral contrast to r/o obstruction or abscess  Would defer steroids currently as low suspicion for IBD flare  Abx Hx of IBD on Mesalamine, currently low suspicion for flare, however ddx includes obstruction, abscess  Will f/u CT abd w oral contrast to r/o obstruction or abscess  Would defer steroids currently as low suspicion for IBD flare  Send ESR, CRP  Cont Zosyn and Azithro   Cont home dose Mesalamine   Diet as tolerated   Last Colonoscopy approx 6 months ago- reportedly normal as per patient   Please notify outpatient GI Physician Antonino Sharma MD that patient is admitted Hx of IBD on Mesalamine, currently low suspicion for flare, however ddx includes obstruction, abscess  Will f/u CT abd w oral contrast to r/o obstruction or abscess  Would defer steroids currently as low suspicion for IBD flare  Send ESR, CRP, Calprotectin  Cont Zosyn and Azithro   Cont home dose Mesalamine   Diet as tolerated   Last Colonoscopy approx 6 months ago- reportedly normal as per patient   Please notify outpatient GI Physician Antonino Sharma MD that patient is admitted Hx of IBD on Mesalamine, currently low suspicion for flare, however ddx includes obstruction, abscess  Will f/u CT abd w oral contrast to r/o obstruction or abscess  Would defer steroids currently as low suspicion for IBD flare  Send ESR, CRP, Calprotectin  Cont Zosyn and Azithro   Cont home dose Mesalamine   Diet as tolerated   Last Colonoscopy approx 6 months ago- reportedly normal as per patient   Please notify outpatient GI Physician Antonino Sharma MD that patient is admitted and clarify the dosing on mesalamine as patient not clear on the exact dose---rather the dose seems way too low.   -Rule out c. diff in case diarrhea is not related to pneumonia.

## 2018-08-30 NOTE — CONSULT NOTE ADULT - SUBJECTIVE AND OBJECTIVE BOX
HPI: 73M IBD , HTN, CKD (baseline cr 1.8-1.9 per pt), BPH, hx nephrolithiasis p/w diarrhea, fever. Pt states 3 weeks ago he went to his PCP and received the pneumovax vaccine. Afterwards his RUE was swollen (which has since improved) and he developed "cold like symptoms" w cough. His cough has overall improved. No sore throat. Has allergic rhinitis so always w some rhinorrhea. This past  he went to a wedding on [place]; mostly indoors but partially outdoors. On Monday he went up to [place]. However, in [place], he felt very sick w n/v/ diarrhea. No recent abx use. He also had subjective fevers, chills, rigors. Stayed inside whole time he was in [place]. Denies other travel, hiking, pets, bug/tick bites.    For IBD, pt on apriso-ER and montelukast.      PAST MEDICAL & SURGICAL HISTORY:  IBD (inflammatory bowel disease)  HTN  CKD  BPH    No significant past surgical history    Allergies    No Known Allergies    Intolerances        ANTIMICROBIALS:  vancomycin  IVPB 1000 once      OTHER MEDS:  uro CitK potassium citrate, albuterol, valsartan 40mg, crestor 5 mg, apriso-er 0.375 gm, montelukast 5 mg, coq10 120mg, vitamin B12 100 mcg    SOCIAL HISTORY:    Marital Status:    Occupation: pediatric cardiologist  Lives with: wife    Substance Use (street drugs): denies  Tobacco Usage:  denies  Alcohol Usage: denies    FAMILY HISTORY:  Mother had dementia  father  of lung adenocarcinoma at age 61      ROS:  All other systems negative     Constitutional:  fevers, chills, no weight loss, no night sweats  Eye: no eye pain, no redness, no vision changes  ENT:  no sore throat, no rhinorrhea  Cardiovascular:  no chest pain, no palpitation  Respiratory:  no SOB, no cough  GI:  no abd pain, vomiting, diarrhea  urinary: no dysuria, no hematuria, no flank pain  : no  discharge or bleeding  musculoskeletal:  no joint pain, no joint swelling  skin:  no rash  neurology:  no headache, no seizure, no change in mental status  psych: no anxiety, no depression     Physical Exam:    General:    NAD, non toxic  Head: atraumatic, normocephalic  Eyes: normal sclera and conjunctiva  ENT:   no oropharyngeal lesions, no LAD, neck supple  Cardio:    regular S1,S2  Respiratory:   clear b/l, no wheezing  abd:   soft, BS +, not tender, no hepatosplenomegaly  :     no CVAT, no suprapubic tenderness  Musculoskeletal : no joint swelling, no edema  Skin:    no rash  vascular: no lines, normal pulses  Neurologic: no focal deficits  psych: normal affect, no suicidal ideation      Drug Dosing Weight      Vital Signs Last 24 Hrs  T(F): 101.7 (18 @ 16:08), Max: 102.3 (18 @ 14:05)    Vital Signs Last 24 Hrs  HR: 84 (18 @ 16:08) (84 - 102)  BP: 133/62 (18 @ 16:08) (133/62 - 168/79)  RR: 18 (18 @ 16:08)  SpO2: 95% (18 @ 16:08) (94% - 100%)  Wt(kg): --                          15.0   12.7  )-----------( 205      ( 30 Aug 2018 14:53 )             44.6           133<L>  |  93<L>  |  25<H>  ----------------------------<  126<H>  4.4   |  24  |  1.78<H>    Ca    9.7      30 Aug 2018 14:53    TPro  8.3  /  Alb  4.4  /  TBili  1.2  /  DBili  x   /  AST  56<H>  /  ALT  75<H>  /  AlkPhos  79        Urinalysis Basic - ( 30 Aug 2018 16:12 )    Color: Yellow / Appearance: SL Turbid / S.017 / pH: x  Gluc: x / Ketone: Negative  / Bili: Negative / Urobili: Negative   Blood: x / Protein: 100 mg/dL / Nitrite: Negative   Leuk Esterase: Negative / RBC: 3-5 /HPF / WBC 0-2 /HPF   Sq Epi: x / Non Sq Epi: x / Bacteria: Few /HPF        MICROBIOLOGY:  BCx x 2 sent- results pending    RADIOLOGY:  No imaging HPI: 73M with IBD , HTN, CKD (baseline cr 1.8-1.9 per pt), BPH, hx nephrolithiasis p/w diarrhea, fever. Pt states 3 weeks ago he went to his PCP and received the pneumovax vaccine. Afterwards his RUE was swollen (which has since improved) and he developed "cold like symptoms" w cough. His cough has overall improved. No sore throat. Has allergic rhinitis so always w some rhinorrhea. This past  he went to a wedding on [place]; mostly indoors but partially outdoors. On Monday he went up to [place]. However, in [place], he felt very sick w n/v/ diarrhea. No recent abx use. He also had subjective fevers, chills, rigors. Stayed inside whole time he was in [place]. Denies other travel, hiking, pets, bug/tick bites.    For IBD, pt on apriso-ER and montelukast.    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: wife    Substance Use (street drugs): denies  Tobacco Usage:  denies  Alcohol Usage: denies    FAMILY HISTORY:  Mother had dementia  father  of lung adenocarcinoma at age 61      ROS:  All other systems negative     Constitutional:  fevers, chills, no weight loss, no night sweats  Eye: no eye pain, no redness, no vision changes  ENT:  no sore throat, no rhinorrhea  Cardiovascular:  no chest pain, no palpitation  Respiratory:  no SOB, no cough  GI:  no abd pain, vomiting, diarrhea  urinary: no dysuria, no hematuria, no flank pain  : no  discharge or bleeding  musculoskeletal:  no joint pain, no joint swelling  skin:  no rash  neurology:  no headache, no seizure, no change in mental status  psych: no anxiety, no depression     Physical Exam:    General:    NAD, non toxic  Head: atraumatic, normocephalic  Eyes: normal sclera and conjunctiva  ENT:   no oropharyngeal lesions, no LAD, neck supple  Cardio:    regular S1,S2  Respiratory:   clear b/l, no wheezing  abd:   soft, BS +, not tender, no hepatosplenomegaly  :     no CVAT, no suprapubic tenderness  Musculoskeletal : no joint swelling, no edema  Skin:    no rash  vascular: no lines, normal pulses  Neurologic: no focal deficits  psych: normal affect, no suicidal ideation      Drug Dosing Weight      Vital Signs Last 24 Hrs  T(F): 101.7 (18 @ 16:08), Max: 102.3 (18 @ 14:05)    Vital Signs Last 24 Hrs  HR: 84 (18 @ 16:08) (84 - 102)  BP: 133/62 (18 @ 16:08) (133/62 - 168/79)  RR: 18 (18 @ 16:08)  SpO2: 95% (18 @ 16:08) (94% - 100%)  Wt(kg): --                          15.0   12.7  )-----------( 205      ( 30 Aug 2018 14:53 )             44.6           133<L>  |  93<L>  |  25<H>  ----------------------------<  126<H>  4.4   |  24  |  1.78<H>    Ca    9.7      30 Aug 2018 14:53    TPro  8.3  /  Alb  4.4  /  TBili  1.2  /  DBili  x   /  AST  56<H>  /  ALT  75<H>  /  AlkPhos  79        Urinalysis Basic - ( 30 Aug 2018 16:12 )    Color: Yellow / Appearance: SL Turbid / S.017 / pH: x  Gluc: x / Ketone: Negative  / Bili: Negative / Urobili: Negative   Blood: x / Protein: 100 mg/dL / Nitrite: Negative   Leuk Esterase: Negative / RBC: 3-5 /HPF / WBC 0-2 /HPF   Sq Epi: x / Non Sq Epi: x / Bacteria: Few /HPF        MICROBIOLOGY:  BCx x 2 sent- results pending    RADIOLOGY:  No imaging

## 2018-08-30 NOTE — ED ADULT NURSE NOTE - OBJECTIVE STATEMENT
73y male brought in by EMS from home c/o fever. A&Ox3 and febrile with wife at bedside. Hx of HTN, HLD, asthma and IBD. Reports fever/chills (Tmax 102) and diarrhea x3 days. Pt reports non-specifiec chest pain 2 days ago, denies chest pain in ED. Reports recent travel  Pt c/o nausea but denies vomit. Pt appears pale and tremulous. Lungs CTA. Abdomen soft, nontender.

## 2018-08-30 NOTE — ED PROVIDER NOTE - OBJECTIVE STATEMENT
PGY1/MD Mariah. 74 yo M with PMH of recently identified high PSA, IBD, cervicalgia, cecal append itis, kidney stone, presents with fever, fatigue, diarrhea. Pt first had fever 3 wks ago with flulike symptoms, which was subsided spontaneously, and became febrile again. It started 4 days ago, with yellwish-diarrea, watery, without associated abd pain. He feels fatigue, nauseous, and had vomiting 3 times this morning. Has had frequent urination, difficulty urination. Also has had cough x3-4 days without sputum production. He visited his son's house at [place] and came back this morning. Also endorses substernal, sharp, chest pain without exhaustion. No recent hx of abx use. PGY1/MD Mariah. 74 yo M with PMH of recently identified high PSA, IBD (Crohn's disease on mesalamine), cervicalgia, cecal append itis, kidney stone, presents with fever, fatigue, diarrhea. Pt first had fever 3 wks ago with flulike symptoms, which was subsided spontaneously, and became febrile again. It started 4 days ago, with yellwish-diarrea, watery, without associated abd pain. He feels fatigue, nauseous, and had vomiting 3 times this morning. Has had frequent urination, difficulty urination. Also has had cough x3-4 days without sputum production. He visited his son's house at [place] and came back this morning. Also endorses substernal, sharp, chest pain without exhaustion. No recent hx of abx use.

## 2018-08-30 NOTE — H&P ADULT - PROBLEM SELECTOR PLAN 4
Hx of IBD on Mesalamine, currently low suspicion for flare, however ddx includes obstruction, abscess  Will f/u CT abd w oral contrast to r/o obstruction or abscess  Would defer steroids currently as low suspicion for IBD flare  Abx Hx of IBD, on Mesalamine  Send GI PCR, C diff sample  Plan as above Hx of IBD, on Mesalamine  Cont abx  Send GI PCR, C diff sample  Plan as above

## 2018-08-30 NOTE — H&P ADULT - HISTORY OF PRESENT ILLNESS
73 y.o. man with a PMH of Crohn's well managed with mesalamine, HTN, HLD, asthma, kidney stones presents to the ED with a 3 day history of fever and explosive diarrhea. The patient was in his usual state of health until 3 days ago when he said he attended a wedding in [facility], then drove up to [place]. On arrival to [place], he said he started having fevers, chills, diffuse sweating, and explosive diarrhea described as watery and brown w/o blood. He reports feeling "incredibly" fatigued with rigors, nauseous with retching but nothing coming up, an intermittent dry cough, and sinus congestion. He also reports an episode of left sided chest pain 2 days ago without radiation or any crushing/burning pain. He denies any abdominal pain, changes in urinary frequency or blood in the urine. He reports having his Prevnar vaccination 3 weeks ago with a small injection site reaction. His last colonoscopy was 6 months ago which was unremarkable.       On arrival to the ED, his vitals were 102.1F, HR 92, /75, RR 22, O2 94% RA. ID was consulted and he was put on zosyn for empiric coverage and azithromycin for possible legionella. 73 y.o. man with a PMH of Crohn's well managed with mesalamine, HTN, HLD, asthma, kidney stones presents to the ED with a 3 day history of fever and explosive diarrhea. The patient was in his usual state of health until 3 days ago when he said he attended a wedding in [facility], then drove up to [place]. On arrival to [place], he said he started having fevers, chills, diffuse sweating, and explosive diarrhea described as watery and brown w/o blood. He had about 3 BM per day but not consistent w/ crohn's flare. He reports feeling "incredibly" fatigued with rigors, nauseous with retching but nothing coming up, an intermittent dry cough, and sinus congestion. He also reports an episode of left sided chest pain 2 days ago without radiation or any crushing/burning pain. However this chest pain is only with coughing and is not pleuritic.  He denies any abdominal pain, changes in urinary frequency or blood in the urine. He reports having his Prevnar vaccination 3 weeks ago with a small injection site reaction. His last colonoscopy was 6 months ago which was unremarkable.       On arrival to the ED, his vitals were 102.1F, HR 92, /75, RR 22, O2 94% RA. ID was consulted and he was put on zosyn for empiric coverage and azithromycin for possible legionella.

## 2018-08-30 NOTE — ED ADULT NURSE REASSESSMENT NOTE - NS ED NURSE REASSESS COMMENT FT1
Upon arrival to ED, 2 IV access placed. IVF initiated immediately. Blood cultures and urine specimen sent to lab. Antibiotics administered. Bobby MONTE notified regarding continued fever, unable to administer Toradol due to increased creatinine. Pt to US now. Pt question vancomycin administration due to increased creatinine. Vanco order being discussed by Mariah MONTE with ID and PMD.

## 2018-08-31 ENCOUNTER — APPOINTMENT (OUTPATIENT)
Dept: DERMATOLOGY | Facility: CLINIC | Age: 73
End: 2018-08-31

## 2018-08-31 DIAGNOSIS — J15.9 UNSPECIFIED BACTERIAL PNEUMONIA: ICD-10-CM

## 2018-08-31 LAB
ALBUMIN SERPL ELPH-MCNC: 3.6 G/DL — SIGNIFICANT CHANGE UP (ref 3.3–5)
ALP SERPL-CCNC: 58 U/L — SIGNIFICANT CHANGE UP (ref 40–120)
ALT FLD-CCNC: 50 U/L — HIGH (ref 10–45)
ANION GAP SERPL CALC-SCNC: 14 MMOL/L — SIGNIFICANT CHANGE UP (ref 5–17)
APTT BLD: 29.4 SEC — SIGNIFICANT CHANGE UP (ref 27.5–37.4)
AST SERPL-CCNC: 41 U/L — HIGH (ref 10–40)
BASOPHILS # BLD AUTO: 0 K/UL — SIGNIFICANT CHANGE UP (ref 0–0.2)
BASOPHILS NFR BLD AUTO: 0 % — SIGNIFICANT CHANGE UP (ref 0–2)
BILIRUB SERPL-MCNC: 0.9 MG/DL — SIGNIFICANT CHANGE UP (ref 0.2–1.2)
BUN SERPL-MCNC: 22 MG/DL — SIGNIFICANT CHANGE UP (ref 7–23)
CALCIUM SERPL-MCNC: 8.5 MG/DL — SIGNIFICANT CHANGE UP (ref 8.4–10.5)
CHLORIDE SERPL-SCNC: 96 MMOL/L — SIGNIFICANT CHANGE UP (ref 96–108)
CO2 SERPL-SCNC: 20 MMOL/L — LOW (ref 22–31)
CREAT SERPL-MCNC: 1.7 MG/DL — HIGH (ref 0.5–1.3)
CULTURE RESULTS: NO GROWTH — SIGNIFICANT CHANGE UP
EOSINOPHIL # BLD AUTO: 0 K/UL — SIGNIFICANT CHANGE UP (ref 0–0.5)
EOSINOPHIL NFR BLD AUTO: 0 % — SIGNIFICANT CHANGE UP (ref 0–6)
GLUCOSE SERPL-MCNC: 125 MG/DL — HIGH (ref 70–99)
HCT VFR BLD CALC: 36.3 % — LOW (ref 39–50)
HGB BLD-MCNC: 12.4 G/DL — LOW (ref 13–17)
INR BLD: 1.29 RATIO — HIGH (ref 0.88–1.16)
LEGIONELLA AG UR QL: POSITIVE
LYMPHOCYTES # BLD AUTO: 0.42 K/UL — LOW (ref 1–3.3)
LYMPHOCYTES # BLD AUTO: 5.4 % — LOW (ref 13–44)
MANUAL SMEAR VERIFICATION: SIGNIFICANT CHANGE UP
MCHC RBC-ENTMCNC: 29.5 PG — SIGNIFICANT CHANGE UP (ref 27–34)
MCHC RBC-ENTMCNC: 34.2 GM/DL — SIGNIFICANT CHANGE UP (ref 32–36)
MCV RBC AUTO: 86.2 FL — SIGNIFICANT CHANGE UP (ref 80–100)
MONOCYTES # BLD AUTO: 0.62 K/UL — SIGNIFICANT CHANGE UP (ref 0–0.9)
MONOCYTES NFR BLD AUTO: 8 % — SIGNIFICANT CHANGE UP (ref 2–14)
NEUTROPHILS # BLD AUTO: 6.71 K/UL — SIGNIFICANT CHANGE UP (ref 1.8–7.4)
NEUTROPHILS NFR BLD AUTO: 86.6 % — HIGH (ref 43–77)
PLAT MORPH BLD: NORMAL — SIGNIFICANT CHANGE UP
PLATELET # BLD AUTO: 166 K/UL — SIGNIFICANT CHANGE UP (ref 150–400)
POTASSIUM SERPL-MCNC: 4.3 MMOL/L — SIGNIFICANT CHANGE UP (ref 3.5–5.3)
POTASSIUM SERPL-SCNC: 4.3 MMOL/L — SIGNIFICANT CHANGE UP (ref 3.5–5.3)
PROT SERPL-MCNC: 6.7 G/DL — SIGNIFICANT CHANGE UP (ref 6–8.3)
PROTHROM AB SERPL-ACNC: 14.7 SEC — HIGH (ref 10–13.1)
RAPID RVP RESULT: SIGNIFICANT CHANGE UP
RBC # BLD: 4.21 M/UL — SIGNIFICANT CHANGE UP (ref 4.2–5.8)
RBC # FLD: 13.1 % — SIGNIFICANT CHANGE UP (ref 10.3–14.5)
RBC BLD AUTO: NORMAL — SIGNIFICANT CHANGE UP
SODIUM SERPL-SCNC: 130 MMOL/L — LOW (ref 135–145)
SPECIMEN SOURCE: SIGNIFICANT CHANGE UP
WBC # BLD: 7.75 K/UL — SIGNIFICANT CHANGE UP (ref 3.8–10.5)
WBC # FLD AUTO: 7.75 K/UL — SIGNIFICANT CHANGE UP (ref 3.8–10.5)

## 2018-08-31 PROCEDURE — 99233 SBSQ HOSP IP/OBS HIGH 50: CPT

## 2018-08-31 RX ORDER — ACETAMINOPHEN 500 MG
650 TABLET ORAL EVERY 6 HOURS
Qty: 0 | Refills: 0 | Status: DISCONTINUED | OUTPATIENT
Start: 2018-08-31 | End: 2018-09-04

## 2018-08-31 RX ORDER — CEFTRIAXONE 500 MG/1
1 INJECTION, POWDER, FOR SOLUTION INTRAMUSCULAR; INTRAVENOUS ONCE
Qty: 0 | Refills: 0 | Status: COMPLETED | OUTPATIENT
Start: 2018-08-31 | End: 2018-08-31

## 2018-08-31 RX ORDER — POTASSIUM CITRATE MONOHYDRATE 100 %
20 POWDER (GRAM) MISCELLANEOUS THREE TIMES A DAY
Qty: 0 | Refills: 0 | Status: DISCONTINUED | OUTPATIENT
Start: 2018-08-31 | End: 2018-08-31

## 2018-08-31 RX ORDER — POTASSIUM CITRATE MONOHYDRATE 100 %
20 POWDER (GRAM) MISCELLANEOUS
Qty: 0 | Refills: 0 | Status: DISCONTINUED | OUTPATIENT
Start: 2018-08-31 | End: 2018-09-04

## 2018-08-31 RX ORDER — POTASSIUM CITRATE MONOHYDRATE 100 %
30 POWDER (GRAM) MISCELLANEOUS
Qty: 0 | Refills: 0 | Status: DISCONTINUED | OUTPATIENT
Start: 2018-08-31 | End: 2018-09-04

## 2018-08-31 RX ORDER — CEFTRIAXONE 500 MG/1
INJECTION, POWDER, FOR SOLUTION INTRAMUSCULAR; INTRAVENOUS
Qty: 0 | Refills: 0 | Status: DISCONTINUED | OUTPATIENT
Start: 2018-08-31 | End: 2018-09-01

## 2018-08-31 RX ORDER — CEFTRIAXONE 500 MG/1
1 INJECTION, POWDER, FOR SOLUTION INTRAMUSCULAR; INTRAVENOUS EVERY 24 HOURS
Qty: 0 | Refills: 0 | Status: DISCONTINUED | OUTPATIENT
Start: 2018-09-01 | End: 2018-09-01

## 2018-08-31 RX ORDER — MESALAMINE 400 MG
0.38 TABLET, DELAYED RELEASE (ENTERIC COATED) ORAL
Qty: 0 | Refills: 0 | Status: DISCONTINUED | OUTPATIENT
Start: 2018-08-31 | End: 2018-09-04

## 2018-08-31 RX ADMIN — Medication 0.38 GRAM(S): at 00:29

## 2018-08-31 RX ADMIN — Medication 3 MILLILITER(S): at 11:17

## 2018-08-31 RX ADMIN — PIPERACILLIN AND TAZOBACTAM 25 GRAM(S): 4; .5 INJECTION, POWDER, LYOPHILIZED, FOR SOLUTION INTRAVENOUS at 01:00

## 2018-08-31 RX ADMIN — Medication 0.38 GRAM(S): at 15:11

## 2018-08-31 RX ADMIN — AZITHROMYCIN 250 MILLIGRAM(S): 500 TABLET, FILM COATED ORAL at 21:08

## 2018-08-31 RX ADMIN — Medication 3 MILLILITER(S): at 23:13

## 2018-08-31 RX ADMIN — Medication 20 MILLIEQUIVALENT(S): at 09:04

## 2018-08-31 RX ADMIN — ATORVASTATIN CALCIUM 20 MILLIGRAM(S): 80 TABLET, FILM COATED ORAL at 06:11

## 2018-08-31 RX ADMIN — SODIUM CHLORIDE 75 MILLILITER(S): 9 INJECTION INTRAMUSCULAR; INTRAVENOUS; SUBCUTANEOUS at 22:29

## 2018-08-31 RX ADMIN — Medication 3 MILLILITER(S): at 06:11

## 2018-08-31 RX ADMIN — Medication 3 MILLILITER(S): at 17:41

## 2018-08-31 RX ADMIN — PIPERACILLIN AND TAZOBACTAM 25 GRAM(S): 4; .5 INJECTION, POWDER, LYOPHILIZED, FOR SOLUTION INTRAVENOUS at 09:03

## 2018-08-31 RX ADMIN — Medication 0.38 GRAM(S): at 19:56

## 2018-08-31 RX ADMIN — Medication 100 MILLIGRAM(S): at 06:11

## 2018-08-31 RX ADMIN — Medication 10 MILLIEQUIVALENT(S): at 00:29

## 2018-08-31 RX ADMIN — Medication 20 MILLIEQUIVALENT(S): at 19:56

## 2018-08-31 RX ADMIN — CEFTRIAXONE 100 GRAM(S): 500 INJECTION, POWDER, FOR SOLUTION INTRAMUSCULAR; INTRAVENOUS at 15:41

## 2018-08-31 RX ADMIN — ENOXAPARIN SODIUM 30 MILLIGRAM(S): 100 INJECTION SUBCUTANEOUS at 11:17

## 2018-08-31 RX ADMIN — Medication 3 MILLILITER(S): at 02:09

## 2018-08-31 RX ADMIN — Medication 10 MILLIEQUIVALENT(S): at 06:11

## 2018-08-31 RX ADMIN — Medication 650 MILLIGRAM(S): at 04:26

## 2018-08-31 RX ADMIN — Medication 650 MILLIGRAM(S): at 19:06

## 2018-08-31 RX ADMIN — Medication 650 MILLIGRAM(S): at 13:00

## 2018-08-31 NOTE — PROGRESS NOTE ADULT - SUBJECTIVE AND OBJECTIVE BOX
Geo Rodríguez MD  Pager [phone]  Spectra [phone]  Cell Phone [phone]    Patient is a 73y old  Male who presents with a chief complaint of fevers (30 Aug 2018 20:15)        SUBJECTIVE / OVERNIGHT EVENTS: Patient feels better compared to presentation. Still with non-productive cough. No further chest discomfort.      MEDICATIONS  (STANDING):  ALBUTerol/ipratropium for Nebulization 3 milliLiter(s) Nebulizer every 6 hours  allopurinol 100 milliGRAM(s) Oral daily  atorvastatin 20 milliGRAM(s) Oral at bedtime  azithromycin  IVPB 500 milliGRAM(s) IV Intermittent every 24 hours  cefTRIAXone   IVPB      cefTRIAXone   IVPB 1 Gram(s) IV Intermittent once  enoxaparin Injectable 30 milliGRAM(s) SubCutaneous daily  potassium citrate 20 milliEquivalent(s) Oral three times a day    MEDICATIONS  (PRN):  acetaminophen   Tablet 650 milliGRAM(s) Oral every 6 hours PRN For Temp greater than 38 C (100.4 F)      Vital Signs Last 24 Hrs  T(C): 38.3 (31 Aug 2018 09:22), Max: 39.4 (30 Aug 2018 18:46)  T(F): 100.9 (31 Aug 2018 09:22), Max: 103 (30 Aug 2018 18:46)  HR: 76 (31 Aug 2018 07:10) (66 - 102)  BP: 124/63 (31 Aug 2018 07:10) (116/64 - 168/79)  BP(mean): --  RR: 18 (31 Aug 2018 07:10) (17 - 22)  SpO2: 95% (31 Aug 2018 07:10) (94% - 100%)  CAPILLARY BLOOD GLUCOSE      POCT Blood Glucose.: 110 mg/dL (30 Aug 2018 14:07)    I&O's Summary        PHYSICAL EXAM  GENERAL: NAD, well-developed  HEAD:  Atraumatic, Normocephalic  EYES: EOMI, PERRLA, conjunctiva and sclera clear  CHEST/LUNG: Rales at RLB; No wheezes  HEART: Regular rate and rhythm; No murmurs, rubs, or gallops  ABDOMEN: Soft, Nontender, Nondistended; Bowel sounds present  EXTREMITIES:  2+ Peripheral Pulses, No clubbing, cyanosis, or edema  PSYCH: AAOx3      LABS:                        12.4   7.75  )-----------( 166      ( 31 Aug 2018 07:37 )             36.3         130<L>  |  96  |  22  ----------------------------<  125<H>  4.3   |  20<L>  |  1.70<H>    Ca    8.5      31 Aug 2018 06:17    TPro  6.7  /  Alb  3.6  /  TBili  0.9  /  DBili  x   /  AST  41<H>  /  ALT  50<H>  /  AlkPhos  58      PT/INR - ( 31 Aug 2018 07:37 )   PT: 14.7 sec;   INR: 1.29 ratio         PTT - ( 31 Aug 2018 07:37 )  PTT:29.4 sec      Urinalysis Basic - ( 30 Aug 2018 16:12 )    Color: Yellow / Appearance: SL Turbid / S.017 / pH: x  Gluc: x / Ketone: Negative  / Bili: Negative / Urobili: Negative   Blood: x / Protein: 100 mg/dL / Nitrite: Negative   Leuk Esterase: Negative / RBC: 3-5 /HPF / WBC 0-2 /HPF   Sq Epi: x / Non Sq Epi: x / Bacteria: Few /HPF          RADIOLOGY & ADDITIONAL TESTS:    Imaging Personally Reviewed:  Consultant(s) Notes Reviewed:    Care Discussed with Consultants/Other Providers:

## 2018-08-31 NOTE — CONSULT NOTE ADULT - SUBJECTIVE AND OBJECTIVE BOX
73 year patient followed by me for many years  multiple episodes astma exacerbation and acute viral and bacterial bronchitis  While on vacation  develped dry cough , fever to 102 and shaking chills  assoicate non bloody diarrhea  after 2 days he sought attention at [facility]  at admission noted to have increased BUN, elevated alctate , and sodium 130  PMH + renal dysfunction, inflamamtory bowel disease  PE  VS stable  sat 99 RA  Heent: clear  heart RSR no murmurs  lungs reduced lung sounds , no wheeze  abd soft non tender  extrmities:  warm  no cyanosis    labs;  WBC increased Na 130    mild elevation lactate

## 2018-08-31 NOTE — PROGRESS NOTE ADULT - SUBJECTIVE AND OBJECTIVE BOX
infectious diseases progress note:    Patient is a 73y old  Male who presents with a chief complaint of Diarrhea (30 Aug 2018 20:15)        Sepsis        ROS:  CONSTITUTIONA  fever or chills, feels well, good appetite  EYES:  Negative  blurry vision or double vision  CARDIOVASCULAR:  Negative for chest pain or palpitations  RESPIRATORY:  Negative for cough, wheezing, or SOB   GASTROINTESTINAL:  Negative for nausea, vomiting, diarrhea, constipation, or abdominal pain  GENITOURINARY:  Negative frequency, urgency or dysuria  NEUROLOGIC:  No headache, confusion, dizziness, lightheadedness    Allergies    No Known Allergies    Intolerances        ANTIBIOTICS/RELEVANT:  antimicrobials  azithromycin  IVPB 500 milliGRAM(s) IV Intermittent every 24 hours  cefTRIAXone   IVPB      cefTRIAXone   IVPB 1 Gram(s) IV Intermittent once    immunologic:    OTHER:  acetaminophen   Tablet 650 milliGRAM(s) Oral every 6 hours PRN  ALBUTerol/ipratropium for Nebulization 3 milliLiter(s) Nebulizer every 6 hours  allopurinol 100 milliGRAM(s) Oral daily  atorvastatin 20 milliGRAM(s) Oral at bedtime  enoxaparin Injectable 30 milliGRAM(s) SubCutaneous daily  potassium citrate 20 milliEquivalent(s) Oral three times a day      Objective:  Vital Signs Last 24 Hrs  T(C): 38.3 (31 Aug 2018 09:22), Max: 39.4 (30 Aug 2018 18:46)  T(F): 100.9 (31 Aug 2018 09:22), Max: 103 (30 Aug 2018 18:46)  HR: 76 (31 Aug 2018 07:10) (66 - 102)  BP: 124/63 (31 Aug 2018 07:10) (116/64 - 168/79)  BP(mean): --  RR: 18 (31 Aug 2018 07:10) (17 - 22)  SpO2: 95% (31 Aug 2018 07:10) (94% - 100%)    PHYSICAL EXAM:   no acute distress  Eyes:RAFAL, EOMI  Ear/Nose/Throat: no oral lesion, no sinus tenderness on percussion	  Neck:no JVD, no lymphadenopathy, supple   Gastrointestinal:soft, (+) BS, no HSM  Extremities:no e/e/c        LABS:                        12.4   7.75  )-----------( 166      ( 31 Aug 2018 07:37 )             36.3     08-31    130<L>  |  96  |  22  ----------------------------<  125<H>  4.3   |  20<L>  |  1.70<H>    Ca    8.5      31 Aug 2018 06:17    TPro  6.7  /  Alb  3.6  /  TBili  0.9  /  DBili  x   /  AST  41<H>  /  ALT  50<H>  /  AlkPhos  58  08-31    PT/INR - ( 31 Aug 2018 07:37 )   PT: 14.7 sec;   INR: 1.29 ratio         PTT - ( 31 Aug 2018 07:37 )  PTT:29.4 sec  Urinalysis Basic - ( 30 Aug 2018 16:12 )    Color: Yellow / Appearance: SL Turbid / S.017 / pH: x  Gluc: x / Ketone: Negative  / Bili: Negative / Urobili: Negative   Blood: x / Protein: 100 mg/dL / Nitrite: Negative   Leuk Esterase: Negative / RBC: 3-5 /HPF / WBC 0-2 /HPF   Sq Epi: x / Non Sq Epi: x / Bacteria: Few /HPF          MICROBIOLOGY:    RECENT CULTURES:        RESPIRATORY CULTURES:              RADIOLOGY & ADDITIONAL STUDIES:        Pager [phone]  After 5 pm/weekends or if no response :[phone]

## 2018-08-31 NOTE — PROVIDER CONTACT NOTE (OTHER) - SITUATION
Patient transferred to unit from ED holding. Vitals taken upon arrival and patient with temp of 103.2

## 2018-08-31 NOTE — CONSULT NOTE ADULT - ASSESSMENT
patient with underlying mild to moderate intermittent asthma with recurent exacerbations  controlled IBD  now with RLL pneumonia;  community acquired   agree with azithro and zosyn for present   continue hydration  labs for legionella/pneumococcus pending
73M IBD , HTN, CKD (baseline cr 1.8-1.9 per pt), BPH, hx nephrolithiasis p/w n/v, diarrhea, meets SIRS criteria, likely GI source ?viral gastroenteritis ?diverticulitis ?IBD flare ?pancreatitis ?abd abscess ?cholecystitis.    - UA negative  - check cxr  - send BCx x2  - check ct a/p w contrast if feasible  - check lipase  - check cdiff and GI PCR  - agree w abd u/s  - d/c vanocomycin  - start zosyn    d/w ED attending

## 2018-09-01 DIAGNOSIS — A48.1 LEGIONNAIRES' DISEASE: ICD-10-CM

## 2018-09-01 DIAGNOSIS — A41.9 SEPSIS, UNSPECIFIED ORGANISM: ICD-10-CM

## 2018-09-01 LAB
ANION GAP SERPL CALC-SCNC: 13 MMOL/L — SIGNIFICANT CHANGE UP (ref 5–17)
BASOPHILS # BLD AUTO: 0 K/UL — SIGNIFICANT CHANGE UP (ref 0–0.2)
BASOPHILS NFR BLD AUTO: 0.2 % — SIGNIFICANT CHANGE UP (ref 0–2)
BUN SERPL-MCNC: 18 MG/DL — SIGNIFICANT CHANGE UP (ref 7–23)
CALCIUM SERPL-MCNC: 9 MG/DL — SIGNIFICANT CHANGE UP (ref 8.4–10.5)
CHLORIDE SERPL-SCNC: 96 MMOL/L — SIGNIFICANT CHANGE UP (ref 96–108)
CO2 SERPL-SCNC: 22 MMOL/L — SIGNIFICANT CHANGE UP (ref 22–31)
CREAT SERPL-MCNC: 1.53 MG/DL — HIGH (ref 0.5–1.3)
CULTURE RESULTS: SIGNIFICANT CHANGE UP
EOSINOPHIL # BLD AUTO: 0 K/UL — SIGNIFICANT CHANGE UP (ref 0–0.5)
EOSINOPHIL NFR BLD AUTO: 1.1 % — SIGNIFICANT CHANGE UP (ref 0–6)
GLUCOSE SERPL-MCNC: 116 MG/DL — HIGH (ref 70–99)
HCT VFR BLD CALC: 35 % — LOW (ref 39–50)
HGB BLD-MCNC: 11.7 G/DL — LOW (ref 13–17)
LYMPHOCYTES # BLD AUTO: 0.6 K/UL — LOW (ref 1–3.3)
LYMPHOCYTES # BLD AUTO: 13.4 % — SIGNIFICANT CHANGE UP (ref 13–44)
MCHC RBC-ENTMCNC: 29 PG — SIGNIFICANT CHANGE UP (ref 27–34)
MCHC RBC-ENTMCNC: 33.5 GM/DL — SIGNIFICANT CHANGE UP (ref 32–36)
MCV RBC AUTO: 86.7 FL — SIGNIFICANT CHANGE UP (ref 80–100)
MONOCYTES # BLD AUTO: 0.6 K/UL — SIGNIFICANT CHANGE UP (ref 0–0.9)
MONOCYTES NFR BLD AUTO: 12.8 % — SIGNIFICANT CHANGE UP (ref 2–14)
NEUTROPHILS # BLD AUTO: 3.2 K/UL — SIGNIFICANT CHANGE UP (ref 1.8–7.4)
NEUTROPHILS NFR BLD AUTO: 72.4 % — SIGNIFICANT CHANGE UP (ref 43–77)
PLATELET # BLD AUTO: 161 K/UL — SIGNIFICANT CHANGE UP (ref 150–400)
POTASSIUM SERPL-MCNC: 4.2 MMOL/L — SIGNIFICANT CHANGE UP (ref 3.5–5.3)
POTASSIUM SERPL-SCNC: 4.2 MMOL/L — SIGNIFICANT CHANGE UP (ref 3.5–5.3)
RBC # BLD: 4.04 M/UL — LOW (ref 4.2–5.8)
RBC # FLD: 12.6 % — SIGNIFICANT CHANGE UP (ref 10.3–14.5)
SODIUM SERPL-SCNC: 131 MMOL/L — LOW (ref 135–145)
SPECIMEN SOURCE: SIGNIFICANT CHANGE UP
WBC # BLD: 4.4 K/UL — SIGNIFICANT CHANGE UP (ref 3.8–10.5)
WBC # FLD AUTO: 4.4 K/UL — SIGNIFICANT CHANGE UP (ref 3.8–10.5)

## 2018-09-01 PROCEDURE — 99232 SBSQ HOSP IP/OBS MODERATE 35: CPT

## 2018-09-01 PROCEDURE — 99233 SBSQ HOSP IP/OBS HIGH 50: CPT

## 2018-09-01 RX ADMIN — Medication 100 MILLIGRAM(S): at 08:52

## 2018-09-01 RX ADMIN — Medication 0.38 GRAM(S): at 08:52

## 2018-09-01 RX ADMIN — AZITHROMYCIN 250 MILLIGRAM(S): 500 TABLET, FILM COATED ORAL at 21:13

## 2018-09-01 RX ADMIN — Medication 3 MILLILITER(S): at 05:21

## 2018-09-01 RX ADMIN — Medication 0.38 GRAM(S): at 20:02

## 2018-09-01 RX ADMIN — Medication 30 MILLIEQUIVALENT(S): at 09:39

## 2018-09-01 RX ADMIN — ENOXAPARIN SODIUM 30 MILLIGRAM(S): 100 INJECTION SUBCUTANEOUS at 11:09

## 2018-09-01 RX ADMIN — Medication 650 MILLIGRAM(S): at 05:19

## 2018-09-01 RX ADMIN — Medication 3 MILLILITER(S): at 11:09

## 2018-09-01 RX ADMIN — Medication 3 MILLILITER(S): at 23:31

## 2018-09-01 RX ADMIN — ATORVASTATIN CALCIUM 20 MILLIGRAM(S): 80 TABLET, FILM COATED ORAL at 21:12

## 2018-09-01 RX ADMIN — Medication 3 MILLILITER(S): at 18:20

## 2018-09-01 RX ADMIN — Medication 20 MILLIEQUIVALENT(S): at 20:02

## 2018-09-01 NOTE — PROGRESS NOTE ADULT - SUBJECTIVE AND OBJECTIVE BOX
Patient is a 73y old  Male who presents with a chief complaint of Diarrhea (30 Aug 2018 20:15)      SUBJECTIVE / OVERNIGHT EVENTS:  no acute events overnight, pt with low grade fever 100-100.5 overnight  pt feels a bit better but still with cough, chills  no further episode of diarrhea    ROS:  14 point ROS negative in detail except stated as above    MEDICATIONS  (STANDING):  ALBUTerol/ipratropium for Nebulization 3 milliLiter(s) Nebulizer every 6 hours  allopurinol 100 milliGRAM(s) Oral daily  atorvastatin 20 milliGRAM(s) Oral at bedtime  azithromycin  IVPB 500 milliGRAM(s) IV Intermittent every 24 hours  cefTRIAXone   IVPB      cefTRIAXone   IVPB 1 Gram(s) IV Intermittent every 24 hours  enoxaparin Injectable 30 milliGRAM(s) SubCutaneous daily  mesalamine ER (24-Hour) Capsule 0.375 Gram(s) Oral two times a day  potassium citrate 30 milliEquivalent(s) Oral <User Schedule>  potassium citrate 20 milliEquivalent(s) Oral <User Schedule>    MEDICATIONS  (PRN):  acetaminophen   Tablet 650 milliGRAM(s) Oral every 6 hours PRN For Temp greater than 38 C (100.4 F)      CAPILLARY BLOOD GLUCOSE        I&O's Summary    31 Aug 2018 07:  -  01 Sep 2018 07:00  --------------------------------------------------------  IN: 650 mL / OUT: 1060 mL / NET: -410 mL    01 Sep 2018 07:  -  01 Sep 2018 12:48  --------------------------------------------------------  IN: 480 mL / OUT: 150 mL / NET: 330 mL        PHYSICAL EXAM:  Vital Signs Last 24 Hrs  T(C): 37.2 (01 Sep 2018 09:30), Max: 39.6 (31 Aug 2018 12:56)  T(F): 98.9 (01 Sep 2018 09:30), Max: 103.2 (31 Aug 2018 12:56)  HR: 70 (01 Sep 2018 09:30) (70 - 91)  BP: 120/72 (01 Sep 2018 09:30) (111/63 - 146/82)  BP(mean): --  RR: 16 (01 Sep 2018 09:30) (16 - 18)  SpO2: 94% (01 Sep 2018 09:30) (93% - 95%)    GENERAL: NAD, well-developed  HEAD:  Atraumatic, Normocephalic  EYES: EOMI, PERRLA, conjunctiva and sclera clear  NECK: Supple, No JVD  CHEST/LUNG: rales at the right base  HEART: Regular rate and rhythm; No murmurs, rubs, or gallops  ABDOMEN: Soft, Nontender, Nondistended; Bowel sounds present  EXTREMITIES:  2+ Peripheral Pulses, No clubbing, cyanosis, or edema  NEUROLOGY: AAOx3; non-focal  SKIN: No rashes or lesions    LABS:  personally reviewed                        11.7   4.4   )-----------( 161      ( 01 Sep 2018 07:12 )             35.0         131<L>  |  96  |  18  ----------------------------<  116<H>  4.2   |  22  |  1.53<H>    Ca    9.0      01 Sep 2018 07:12    TPro  6.7  /  Alb  3.6  /  TBili  0.9  /  DBili  x   /  AST  41<H>  /  ALT  50<H>  /  AlkPhos  58  08-31    PT/INR - ( 31 Aug 2018 07:37 )   PT: 14.7 sec;   INR: 1.29 ratio         PTT - ( 31 Aug 2018 07:37 )  PTT:29.4 sec      Urinalysis Basic - ( 30 Aug 2018 16:12 )    Color: Yellow / Appearance: SL Turbid / S.017 / pH: x  Gluc: x / Ketone: Negative  / Bili: Negative / Urobili: Negative   Blood: x / Protein: 100 mg/dL / Nitrite: Negative   Leuk Esterase: Negative / RBC: 3-5 /HPF / WBC 0-2 /HPF   Sq Epi: x / Non Sq Epi: x / Bacteria: Few /HPF    Legionella pneumophila Antigen, Urine (18 @ 22:50)    Legionella Antigen, Urine: Positive        RADIOLOGY & ADDITIONAL TESTS:    Imaging Personally Reviewed:    Consultant(s) Notes Reviewed:  ID    Care Discussed with Consultants/Other Providers:

## 2018-09-01 NOTE — PROGRESS NOTE ADULT - SUBJECTIVE AND OBJECTIVE BOX
73y old  Male who presents with a chief complaint of Diarrhea (30 Aug 2018 20:15)      Interval history:  Febrile, fatigued, still coughing with whitish phlegm, having headaches with cough, no diarrhea today.     No Known Allergies    Antimicrobials:    azithromycin  IVPB 500 milliGRAM(s) IV Intermittent every 24 hours    REVIEW OF SYSTEMS:  No N/V  No dysuria   No rash.     Vital Signs Last 24 Hrs  T(C): 37.6 (09-01-18 @ 13:23), Max: 39.5 (08-31-18 @ 19:03)  T(F): 99.6 (09-01-18 @ 13:23), Max: 103.1 (08-31-18 @ 19:03)  HR: 83 (09-01-18 @ 13:23) (70 - 85)  BP: 142/71 (09-01-18 @ 13:23) (111/63 - 142/71)  RR: 16 (09-01-18 @ 13:23) (16 - 18)  SpO2: 96% (09-01-18 @ 13:23) (93% - 96%)    PHYSICAL EXAM:  Patient in mild distress. AAOX3.  No icterus, no oral ulcers.  Cardiovascular: S1S2 normal.  Lungs: + air entry B/L lung fields.  Gastrointestinal: soft, nontender, nondistended.  Extremities: no edema.  IV sites not inflamed.                           11.7   4.4   )-----------( 161      ( 01 Sep 2018 07:12 )             35.0   09-01    131<L>  |  96  |  18  ----------------------------<  116<H>  4.2   |  22  |  1.53<H>    Ca    9.0      01 Sep 2018 07:12    TPro  6.7  /  Alb  3.6  /  TBili  0.9  /  DBili  x   /  AST  41<H>  /  ALT  50<H>  /  AlkPhos  58  08-31      LIVER FUNCTIONS - ( 31 Aug 2018 06:17 )  Alb: 3.6 g/dL / Pro: 6.7 g/dL / ALK PHOS: 58 U/L / ALT: 50 U/L / AST: 41 U/L / GGT: x           Legionella pneumophila Antigen, Urine (08.30.18 @ 22:50)    Legionella Antigen, Urine: Positive      Rapid Respiratory Viral Panel (08.31.18 @ 00:50)    Rapid RVP Result: NotDetec:      Culture - Urine (collected 30 Aug 2018 18:55)  Source: .Urine Clean Catch (Midstream)  Final Report (31 Aug 2018 23:02):    No growth    Culture - Blood (collected 30 Aug 2018 17:50)  Source: .Blood Blood-Peripheral  Preliminary Report (31 Aug 2018 18:01):    No growth to date.    Culture - Blood (collected 30 Aug 2018 17:50)  Source: .Blood Blood-Peripheral  Preliminary Report (31 Aug 2018 18:01):    No growth to date.      Radiology:  < from: CT Abdomen and Pelvis w/ Oral Cont (08.30.18 @ 20:26) >  IMPRESSION:   Right lower lobe pneumonia.

## 2018-09-02 LAB
ANION GAP SERPL CALC-SCNC: 12 MMOL/L — SIGNIFICANT CHANGE UP (ref 5–17)
BUN SERPL-MCNC: 14 MG/DL — SIGNIFICANT CHANGE UP (ref 7–23)
CALCIUM SERPL-MCNC: 9 MG/DL — SIGNIFICANT CHANGE UP (ref 8.4–10.5)
CHLORIDE SERPL-SCNC: 99 MMOL/L — SIGNIFICANT CHANGE UP (ref 96–108)
CO2 SERPL-SCNC: 23 MMOL/L — SIGNIFICANT CHANGE UP (ref 22–31)
CREAT SERPL-MCNC: 1.24 MG/DL — SIGNIFICANT CHANGE UP (ref 0.5–1.3)
GLUCOSE SERPL-MCNC: 104 MG/DL — HIGH (ref 70–99)
POTASSIUM SERPL-MCNC: 4.1 MMOL/L — SIGNIFICANT CHANGE UP (ref 3.5–5.3)
POTASSIUM SERPL-SCNC: 4.1 MMOL/L — SIGNIFICANT CHANGE UP (ref 3.5–5.3)
SODIUM SERPL-SCNC: 134 MMOL/L — LOW (ref 135–145)

## 2018-09-02 PROCEDURE — 99233 SBSQ HOSP IP/OBS HIGH 50: CPT

## 2018-09-02 RX ORDER — ALBUTEROL 90 UG/1
2.5 AEROSOL, METERED ORAL ONCE
Qty: 0 | Refills: 0 | Status: COMPLETED | OUTPATIENT
Start: 2018-09-02 | End: 2018-09-02

## 2018-09-02 RX ADMIN — Medication 30 MILLIEQUIVALENT(S): at 09:05

## 2018-09-02 RX ADMIN — Medication 100 MILLIGRAM(S): at 09:05

## 2018-09-02 RX ADMIN — ALBUTEROL 2.5 MILLIGRAM(S): 90 AEROSOL, METERED ORAL at 21:06

## 2018-09-02 RX ADMIN — Medication 3 MILLILITER(S): at 11:16

## 2018-09-02 RX ADMIN — AZITHROMYCIN 250 MILLIGRAM(S): 500 TABLET, FILM COATED ORAL at 21:07

## 2018-09-02 RX ADMIN — Medication 0.38 GRAM(S): at 21:08

## 2018-09-02 RX ADMIN — Medication 3 MILLILITER(S): at 06:58

## 2018-09-02 RX ADMIN — Medication 3 MILLILITER(S): at 17:32

## 2018-09-02 RX ADMIN — Medication 3 MILLILITER(S): at 23:37

## 2018-09-02 RX ADMIN — ENOXAPARIN SODIUM 30 MILLIGRAM(S): 100 INJECTION SUBCUTANEOUS at 11:16

## 2018-09-02 RX ADMIN — ATORVASTATIN CALCIUM 20 MILLIGRAM(S): 80 TABLET, FILM COATED ORAL at 21:08

## 2018-09-02 RX ADMIN — Medication 0.38 GRAM(S): at 09:05

## 2018-09-02 RX ADMIN — Medication 20 MILLIEQUIVALENT(S): at 21:08

## 2018-09-02 NOTE — PROGRESS NOTE ADULT - SUBJECTIVE AND OBJECTIVE BOX
Patient is a 73y old  Male who presents with a chief complaint of Diarrhea (30 Aug 2018 20:15)      SUBJECTIVE / OVERNIGHT EVENTS:  no acute events overnight, vss, afebrile  pt feels better this morning, has better appetite and energy    ROS:  14 point ROS negative in detail except stated as above    MEDICATIONS  (STANDING):  ALBUTerol/ipratropium for Nebulization 3 milliLiter(s) Nebulizer every 6 hours  allopurinol 100 milliGRAM(s) Oral daily  atorvastatin 20 milliGRAM(s) Oral at bedtime  azithromycin  IVPB 500 milliGRAM(s) IV Intermittent every 24 hours  enoxaparin Injectable 30 milliGRAM(s) SubCutaneous daily  mesalamine ER (24-Hour) Capsule 0.375 Gram(s) Oral two times a day  potassium citrate 30 milliEquivalent(s) Oral <User Schedule>  potassium citrate 20 milliEquivalent(s) Oral <User Schedule>    MEDICATIONS  (PRN):  acetaminophen   Tablet 650 milliGRAM(s) Oral every 6 hours PRN For Temp greater than 38 C (100.4 F)      CAPILLARY BLOOD GLUCOSE        I&O's Summary    01 Sep 2018 07:01  -  02 Sep 2018 07:00  --------------------------------------------------------  IN: 840 mL / OUT: 1050 mL / NET: -210 mL        PHYSICAL EXAM:  Vital Signs Last 24 Hrs  T(C): 36.9 (02 Sep 2018 10:50), Max: 37.7 (01 Sep 2018 19:33)  T(F): 98.5 (02 Sep 2018 10:50), Max: 99.8 (01 Sep 2018 19:33)  HR: 74 (02 Sep 2018 10:50) (71 - 85)  BP: 130/80 (02 Sep 2018 10:50) (120/75 - 157/90)  BP(mean): --  RR: 18 (02 Sep 2018 10:50) (16 - 18)  SpO2: 95% (02 Sep 2018 10:50) (94% - 97%)    GENERAL: NAD, well-developed  HEAD:  Atraumatic, Normocephalic  EYES: EOMI, PERRLA, conjunctiva and sclera clear  NECK: Supple, No JVD  CHEST/LUNG: Clear to auscultation bilaterally; No wheeze  HEART: Regular rate and rhythm; No murmurs, rubs, or gallops  ABDOMEN: Soft, Nontender, Nondistended; Bowel sounds present  EXTREMITIES:  2+ Peripheral Pulses, No clubbing, cyanosis, or edema  NEUROLOGY: AAOx3; non-focal  SKIN: No rashes or lesions    LABS:  personally reviewed                        11.7   4.4   )-----------( 161      ( 01 Sep 2018 07:12 )             35.0     09-02    134<L>  |  99  |  14  ----------------------------<  104<H>  4.1   |  23  |  1.24    Ca    9.0      02 Sep 2018 06:55      Legionella Antigen, Urine: Positive (08.30.18 @ 22:50)        RADIOLOGY & ADDITIONAL TESTS:    Imaging Personally Reviewed:    Consultant(s) Notes Reviewed:  ID    Care Discussed with Consultants/Other Providers:

## 2018-09-03 LAB
ANION GAP SERPL CALC-SCNC: 13 MMOL/L — SIGNIFICANT CHANGE UP (ref 5–17)
BUN SERPL-MCNC: 14 MG/DL — SIGNIFICANT CHANGE UP (ref 7–23)
CALCIUM SERPL-MCNC: 9.7 MG/DL — SIGNIFICANT CHANGE UP (ref 8.4–10.5)
CHLORIDE SERPL-SCNC: 98 MMOL/L — SIGNIFICANT CHANGE UP (ref 96–108)
CO2 SERPL-SCNC: 25 MMOL/L — SIGNIFICANT CHANGE UP (ref 22–31)
CREAT SERPL-MCNC: 1.37 MG/DL — HIGH (ref 0.5–1.3)
GLUCOSE SERPL-MCNC: 106 MG/DL — HIGH (ref 70–99)
HCT VFR BLD CALC: 37.4 % — LOW (ref 39–50)
HGB BLD-MCNC: 12.5 G/DL — LOW (ref 13–17)
MCHC RBC-ENTMCNC: 29.5 PG — SIGNIFICANT CHANGE UP (ref 27–34)
MCHC RBC-ENTMCNC: 33.4 GM/DL — SIGNIFICANT CHANGE UP (ref 32–36)
MCV RBC AUTO: 88.3 FL — SIGNIFICANT CHANGE UP (ref 80–100)
PLATELET # BLD AUTO: 257 K/UL — SIGNIFICANT CHANGE UP (ref 150–400)
POTASSIUM SERPL-MCNC: 4.2 MMOL/L — SIGNIFICANT CHANGE UP (ref 3.5–5.3)
POTASSIUM SERPL-SCNC: 4.2 MMOL/L — SIGNIFICANT CHANGE UP (ref 3.5–5.3)
RBC # BLD: 4.24 M/UL — SIGNIFICANT CHANGE UP (ref 4.2–5.8)
RBC # FLD: 12.8 % — SIGNIFICANT CHANGE UP (ref 10.3–14.5)
SODIUM SERPL-SCNC: 136 MMOL/L — SIGNIFICANT CHANGE UP (ref 135–145)
WBC # BLD: 3.6 K/UL — LOW (ref 3.8–10.5)
WBC # FLD AUTO: 3.6 K/UL — LOW (ref 3.8–10.5)

## 2018-09-03 PROCEDURE — 99233 SBSQ HOSP IP/OBS HIGH 50: CPT

## 2018-09-03 RX ADMIN — Medication 20 MILLIEQUIVALENT(S): at 21:36

## 2018-09-03 RX ADMIN — ENOXAPARIN SODIUM 30 MILLIGRAM(S): 100 INJECTION SUBCUTANEOUS at 13:12

## 2018-09-03 RX ADMIN — Medication 3 MILLILITER(S): at 21:36

## 2018-09-03 RX ADMIN — Medication 3 MILLILITER(S): at 13:12

## 2018-09-03 RX ADMIN — ATORVASTATIN CALCIUM 20 MILLIGRAM(S): 80 TABLET, FILM COATED ORAL at 21:36

## 2018-09-03 RX ADMIN — Medication 30 MILLIEQUIVALENT(S): at 08:55

## 2018-09-03 RX ADMIN — Medication 0.38 GRAM(S): at 08:55

## 2018-09-03 RX ADMIN — Medication 3 MILLILITER(S): at 06:12

## 2018-09-03 RX ADMIN — AZITHROMYCIN 250 MILLIGRAM(S): 500 TABLET, FILM COATED ORAL at 21:36

## 2018-09-03 RX ADMIN — Medication 100 MILLIGRAM(S): at 08:55

## 2018-09-03 RX ADMIN — Medication 3 MILLILITER(S): at 23:36

## 2018-09-03 RX ADMIN — Medication 0.38 GRAM(S): at 21:36

## 2018-09-03 NOTE — PROGRESS NOTE ADULT - SUBJECTIVE AND OBJECTIVE BOX
Patient is a 73y old  Male who presents with a chief complaint of Diarrhea (30 Aug 2018 20:15)      SUBJECTIVE / OVERNIGHT EVENTS:  no acute events overnight, vss, afebrile  pt feels he is getting better but still complains of chest tightness, cough and weakness  feels that he is not ready to be discharged yet    ROS:  14 point ROS negative in detail except stated as above    MEDICATIONS  (STANDING):  ALBUTerol/ipratropium for Nebulization 3 milliLiter(s) Nebulizer every 6 hours  allopurinol 100 milliGRAM(s) Oral daily  atorvastatin 20 milliGRAM(s) Oral at bedtime  azithromycin  IVPB 500 milliGRAM(s) IV Intermittent every 24 hours  enoxaparin Injectable 30 milliGRAM(s) SubCutaneous daily  mesalamine ER (24-Hour) Capsule 0.375 Gram(s) Oral two times a day  potassium citrate 30 milliEquivalent(s) Oral <User Schedule>  potassium citrate 20 milliEquivalent(s) Oral <User Schedule>    MEDICATIONS  (PRN):  acetaminophen   Tablet 650 milliGRAM(s) Oral every 6 hours PRN For Temp greater than 38 C (100.4 F)  guaiFENesin/dextromethorphan  Syrup 10 milliLiter(s) Oral three times a day PRN Cough      CAPILLARY BLOOD GLUCOSE        I&O's Summary    02 Sep 2018 07:01  -  03 Sep 2018 07:00  --------------------------------------------------------  IN: 1160 mL / OUT: 0 mL / NET: 1160 mL    03 Sep 2018 07:01  -  03 Sep 2018 10:40  --------------------------------------------------------  IN: 240 mL / OUT: 0 mL / NET: 240 mL        PHYSICAL EXAM:  Vital Signs Last 24 Hrs  T(C): 36.6 (03 Sep 2018 10:32), Max: 37.2 (02 Sep 2018 16:58)  T(F): 97.9 (03 Sep 2018 10:32), Max: 98.9 (02 Sep 2018 16:58)  HR: 68 (03 Sep 2018 10:32) (60 - 74)  BP: 135/81 (03 Sep 2018 10:32) (130/80 - 162/88)  BP(mean): --  RR: 18 (03 Sep 2018 10:32) (18 - 18)  SpO2: 95% (03 Sep 2018 10:32) (95% - 96%)    GENERAL: NAD, well-developed  HEAD:  Atraumatic, Normocephalic  EYES: EOMI, PERRLA, conjunctiva and sclera clear  NECK: Supple, No JVD  CHEST/LUNG: rhonchi at the right base  HEART: Regular rate and rhythm; No murmurs, rubs, or gallops  ABDOMEN: Soft, Nontender, Nondistended; Bowel sounds present  EXTREMITIES:  2+ Peripheral Pulses, No clubbing, cyanosis, or edema  NEUROLOGY: AAOx3; non-focal  SKIN: No rashes or lesions    LABS:  personally reviewed                        12.5   3.6   )-----------( 257      ( 03 Sep 2018 06:16 )             37.4     09-03    136  |  98  |  14  ----------------------------<  106<H>  4.2   |  25  |  1.37<H>    Ca    9.7      03 Sep 2018 06:16      Legionella Antigen, Urine: Positive (08.30.18 @ 22:50)    Culture Results:   GI PCR Results: NOT detected  *******Please Note:*******  GI panel PCR evaluates for:  Campylobacter, Plesiomonas shigelloides, Salmonella,  Vibrio, Yersinia enterocolitica, Enteroaggregative  Escherichia coli (EAEC), Enteropathogenic E.coli (EPEC),  Enterotoxigenic E. coli (ETEC) lt/st, Shiga-like  toxin-producing E. coli (STEC) stx1/stx2,  Shigella/ Enteroinvasive E. coli (EIEC), Cryptosporidium,  Cyclospora cayetanensis, Entamoeba histolytica,  Giardia lamblia, Adenovirus F 40/41, Astrovirus,  Norovirus GI/GII, Rotavirus A, Sapovirus (09.01.18 @ 13:30)              RADIOLOGY & ADDITIONAL TESTS:    Imaging Personally Reviewed:    Consultant(s) Notes Reviewed:  ID    Care Discussed with Consultants/Other Providers: Patient is a 73y old  Male who presents with a chief complaint of Diarrhea (30 Aug 2018 20:15)      SUBJECTIVE / OVERNIGHT EVENTS:  no acute events overnight, vss, afebrile  pt feels he is getting better but still complains of chest tightness, cough and weakness  feels that he is not ready to be discharged yet    ROS:  14 point ROS negative in detail except stated as above    MEDICATIONS  (STANDING):  ALBUTerol/ipratropium for Nebulization 3 milliLiter(s) Nebulizer every 6 hours  allopurinol 100 milliGRAM(s) Oral daily  atorvastatin 20 milliGRAM(s) Oral at bedtime  azithromycin  IVPB 500 milliGRAM(s) IV Intermittent every 24 hours  enoxaparin Injectable 30 milliGRAM(s) SubCutaneous daily  mesalamine ER (24-Hour) Capsule 0.375 Gram(s) Oral two times a day  potassium citrate 30 milliEquivalent(s) Oral <User Schedule>  potassium citrate 20 milliEquivalent(s) Oral <User Schedule>    MEDICATIONS  (PRN):  acetaminophen   Tablet 650 milliGRAM(s) Oral every 6 hours PRN For Temp greater than 38 C (100.4 F)  guaiFENesin/dextromethorphan  Syrup 10 milliLiter(s) Oral three times a day PRN Cough      CAPILLARY BLOOD GLUCOSE        I&O's Summary    02 Sep 2018 07:01  -  03 Sep 2018 07:00  --------------------------------------------------------  IN: 1160 mL / OUT: 0 mL / NET: 1160 mL    03 Sep 2018 07:01  -  03 Sep 2018 10:40  --------------------------------------------------------  IN: 240 mL / OUT: 0 mL / NET: 240 mL        PHYSICAL EXAM:  Vital Signs Last 24 Hrs  T(C): 36.6 (03 Sep 2018 10:32), Max: 37.2 (02 Sep 2018 16:58)  T(F): 97.9 (03 Sep 2018 10:32), Max: 98.9 (02 Sep 2018 16:58)  HR: 68 (03 Sep 2018 10:32) (60 - 74)  BP: 135/81 (03 Sep 2018 10:32) (130/80 - 162/88)  BP(mean): --  RR: 18 (03 Sep 2018 10:32) (18 - 18)  SpO2: 95% (03 Sep 2018 10:32) (95% - 96%)    GENERAL: NAD, well-developed  HEAD:  Atraumatic, Normocephalic  EYES: EOMI, PERRLA, conjunctiva and sclera clear  NECK: Supple, No JVD  CHEST/LUNG: rhonchi at the right base  HEART: Regular rate and rhythm; No murmurs, rubs, or gallops  ABDOMEN: Soft, Nontender, Nondistended; Bowel sounds present  EXTREMITIES:  2+ Peripheral Pulses, No clubbing, cyanosis, or edema  NEUROLOGY: AAOx3; non-focal  SKIN: No rashes or lesions    LABS:  personally reviewed                        12.5   3.6   )-----------( 257      ( 03 Sep 2018 06:16 )             37.4     09-03    136  |  98  |  14  ----------------------------<  106<H>  4.2   |  25  |  1.37<H>    Ca    9.7      03 Sep 2018 06:16      Legionella Antigen, Urine: Positive (08.30.18 @ 22:50)    Culture Results:   GI PCR Results: NOT detected  *******Please Note:*******  GI panel PCR evaluates for:  Campylobacter, Plesiomonas shigelloides, Salmonella,  Vibrio, Yersinia enterocolitica, Enteroaggregative  Escherichia coli (EAEC), Enteropathogenic E.coli (EPEC),  Enterotoxigenic E. coli (ETEC) lt/st, Shiga-like  toxin-producing E. coli (STEC) stx1/stx2,  Shigella/ Enteroinvasive E. coli (EIEC), Cryptosporidium,  Cyclospora cayetanensis, Entamoeba histolytica,  Giardia lamblia, Adenovirus F 40/41, Astrovirus,  Norovirus GI/GII, Rotavirus A, Sapovirus (09.01.18 @ 13:30)              RADIOLOGY & ADDITIONAL TESTS:    Imaging Personally Reviewed:    Consultant(s) Notes Reviewed:  ID    Care Discussed with Consultants/Other Providers: Dr. Carrillo

## 2018-09-04 ENCOUNTER — TRANSCRIPTION ENCOUNTER (OUTPATIENT)
Age: 73
End: 2018-09-04

## 2018-09-04 VITALS
HEART RATE: 76 BPM | RESPIRATION RATE: 18 BRPM | OXYGEN SATURATION: 97 % | TEMPERATURE: 98 F | SYSTOLIC BLOOD PRESSURE: 147 MMHG | DIASTOLIC BLOOD PRESSURE: 78 MMHG

## 2018-09-04 PROBLEM — E78.5 HYPERLIPIDEMIA, UNSPECIFIED: Chronic | Status: ACTIVE | Noted: 2018-08-30

## 2018-09-04 PROBLEM — R97.20 ELEVATED PROSTATE SPECIFIC ANTIGEN [PSA]: Chronic | Status: ACTIVE | Noted: 2018-08-30

## 2018-09-04 PROBLEM — M54.2 CERVICALGIA: Chronic | Status: ACTIVE | Noted: 2018-08-30

## 2018-09-04 PROBLEM — I10 ESSENTIAL (PRIMARY) HYPERTENSION: Chronic | Status: ACTIVE | Noted: 2018-08-30

## 2018-09-04 PROBLEM — K52.9 NONINFECTIVE GASTROENTERITIS AND COLITIS, UNSPECIFIED: Chronic | Status: ACTIVE | Noted: 2018-08-30

## 2018-09-04 LAB
ANION GAP SERPL CALC-SCNC: 13 MMOL/L — SIGNIFICANT CHANGE UP (ref 5–17)
BUN SERPL-MCNC: 19 MG/DL — SIGNIFICANT CHANGE UP (ref 7–23)
CALCIUM SERPL-MCNC: 9.7 MG/DL — SIGNIFICANT CHANGE UP (ref 8.4–10.5)
CHLORIDE SERPL-SCNC: 99 MMOL/L — SIGNIFICANT CHANGE UP (ref 96–108)
CO2 SERPL-SCNC: 25 MMOL/L — SIGNIFICANT CHANGE UP (ref 22–31)
CREAT SERPL-MCNC: 1.4 MG/DL — HIGH (ref 0.5–1.3)
CULTURE RESULTS: SIGNIFICANT CHANGE UP
CULTURE RESULTS: SIGNIFICANT CHANGE UP
GLUCOSE SERPL-MCNC: 115 MG/DL — HIGH (ref 70–99)
GRAM STN FLD: SIGNIFICANT CHANGE UP
HCT VFR BLD CALC: 37.8 % — LOW (ref 39–50)
HGB BLD-MCNC: 12.5 G/DL — LOW (ref 13–17)
MCHC RBC-ENTMCNC: 29.2 PG — SIGNIFICANT CHANGE UP (ref 27–34)
MCHC RBC-ENTMCNC: 33 GM/DL — SIGNIFICANT CHANGE UP (ref 32–36)
MCV RBC AUTO: 88.5 FL — SIGNIFICANT CHANGE UP (ref 80–100)
PLATELET # BLD AUTO: 286 K/UL — SIGNIFICANT CHANGE UP (ref 150–400)
POTASSIUM SERPL-MCNC: 4.7 MMOL/L — SIGNIFICANT CHANGE UP (ref 3.5–5.3)
POTASSIUM SERPL-SCNC: 4.7 MMOL/L — SIGNIFICANT CHANGE UP (ref 3.5–5.3)
RBC # BLD: 4.27 M/UL — SIGNIFICANT CHANGE UP (ref 4.2–5.8)
RBC # FLD: 12.9 % — SIGNIFICANT CHANGE UP (ref 10.3–14.5)
SODIUM SERPL-SCNC: 137 MMOL/L — SIGNIFICANT CHANGE UP (ref 135–145)
SPECIMEN SOURCE: SIGNIFICANT CHANGE UP
WBC # BLD: 3.4 K/UL — LOW (ref 3.8–10.5)
WBC # FLD AUTO: 3.4 K/UL — LOW (ref 3.8–10.5)

## 2018-09-04 PROCEDURE — 82962 GLUCOSE BLOOD TEST: CPT

## 2018-09-04 PROCEDURE — 87581 M.PNEUMON DNA AMP PROBE: CPT

## 2018-09-04 PROCEDURE — 82947 ASSAY GLUCOSE BLOOD QUANT: CPT

## 2018-09-04 PROCEDURE — 81001 URINALYSIS AUTO W/SCOPE: CPT

## 2018-09-04 PROCEDURE — 82330 ASSAY OF CALCIUM: CPT

## 2018-09-04 PROCEDURE — 87040 BLOOD CULTURE FOR BACTERIA: CPT

## 2018-09-04 PROCEDURE — 83993 ASSAY FOR CALPROTECTIN FECAL: CPT

## 2018-09-04 PROCEDURE — 85027 COMPLETE CBC AUTOMATED: CPT

## 2018-09-04 PROCEDURE — 84484 ASSAY OF TROPONIN QUANT: CPT

## 2018-09-04 PROCEDURE — 76705 ECHO EXAM OF ABDOMEN: CPT

## 2018-09-04 PROCEDURE — 87798 DETECT AGENT NOS DNA AMP: CPT

## 2018-09-04 PROCEDURE — 97161 PT EVAL LOW COMPLEX 20 MIN: CPT

## 2018-09-04 PROCEDURE — 74176 CT ABD & PELVIS W/O CONTRAST: CPT

## 2018-09-04 PROCEDURE — 96375 TX/PRO/DX INJ NEW DRUG ADDON: CPT

## 2018-09-04 PROCEDURE — 87633 RESP VIRUS 12-25 TARGETS: CPT

## 2018-09-04 PROCEDURE — 82435 ASSAY OF BLOOD CHLORIDE: CPT

## 2018-09-04 PROCEDURE — 87070 CULTURE OTHR SPECIMN AEROBIC: CPT

## 2018-09-04 PROCEDURE — 80048 BASIC METABOLIC PNL TOTAL CA: CPT

## 2018-09-04 PROCEDURE — 84295 ASSAY OF SERUM SODIUM: CPT

## 2018-09-04 PROCEDURE — 99239 HOSP IP/OBS DSCHRG MGMT >30: CPT

## 2018-09-04 PROCEDURE — 85730 THROMBOPLASTIN TIME PARTIAL: CPT

## 2018-09-04 PROCEDURE — 87086 URINE CULTURE/COLONY COUNT: CPT

## 2018-09-04 PROCEDURE — 84132 ASSAY OF SERUM POTASSIUM: CPT

## 2018-09-04 PROCEDURE — 85014 HEMATOCRIT: CPT

## 2018-09-04 PROCEDURE — 85610 PROTHROMBIN TIME: CPT

## 2018-09-04 PROCEDURE — 96374 THER/PROPH/DIAG INJ IV PUSH: CPT

## 2018-09-04 PROCEDURE — 74018 RADEX ABDOMEN 1 VIEW: CPT

## 2018-09-04 PROCEDURE — 80053 COMPREHEN METABOLIC PANEL: CPT

## 2018-09-04 PROCEDURE — 99285 EMERGENCY DEPT VISIT HI MDM: CPT | Mod: 25

## 2018-09-04 PROCEDURE — 87507 IADNA-DNA/RNA PROBE TQ 12-25: CPT

## 2018-09-04 PROCEDURE — 94640 AIRWAY INHALATION TREATMENT: CPT

## 2018-09-04 PROCEDURE — 83605 ASSAY OF LACTIC ACID: CPT

## 2018-09-04 PROCEDURE — 87449 NOS EACH ORGANISM AG IA: CPT

## 2018-09-04 PROCEDURE — 87486 CHLMYD PNEUM DNA AMP PROBE: CPT

## 2018-09-04 PROCEDURE — 71045 X-RAY EXAM CHEST 1 VIEW: CPT

## 2018-09-04 PROCEDURE — 82803 BLOOD GASES ANY COMBINATION: CPT

## 2018-09-04 PROCEDURE — 96361 HYDRATE IV INFUSION ADD-ON: CPT

## 2018-09-04 RX ORDER — AZITHROMYCIN 500 MG/1
1 TABLET, FILM COATED ORAL
Qty: 5 | Refills: 0
Start: 2018-09-04 | End: 2018-09-08

## 2018-09-04 RX ORDER — ALLOPURINOL 300 MG
1 TABLET ORAL
Qty: 0 | Refills: 0 | DISCHARGE
Start: 2018-09-04

## 2018-09-04 RX ORDER — ALLOPURINOL 300 MG
1 TABLET ORAL
Qty: 0 | Refills: 0 | COMMUNITY

## 2018-09-04 RX ORDER — VALSARTAN 80 MG/1
1 TABLET ORAL
Qty: 0 | Refills: 0 | COMMUNITY

## 2018-09-04 RX ORDER — VALSARTAN 80 MG/1
1 TABLET ORAL
Qty: 0 | Refills: 0 | DISCHARGE
Start: 2018-09-04

## 2018-09-04 RX ORDER — IPRATROPIUM/ALBUTEROL SULFATE 18-103MCG
3 AEROSOL WITH ADAPTER (GRAM) INHALATION
Qty: 1 | Refills: 0
Start: 2018-09-04 | End: 2018-10-03

## 2018-09-04 RX ORDER — IPRATROPIUM/ALBUTEROL SULFATE 18-103MCG
3 AEROSOL WITH ADAPTER (GRAM) INHALATION
Qty: 1 | Refills: 0 | OUTPATIENT
Start: 2018-09-04 | End: 2018-10-03

## 2018-09-04 RX ORDER — AZITHROMYCIN 500 MG/1
1 TABLET, FILM COATED ORAL
Qty: 5 | Refills: 0 | OUTPATIENT
Start: 2018-09-04 | End: 2018-09-08

## 2018-09-04 RX ADMIN — Medication 30 MILLIEQUIVALENT(S): at 08:35

## 2018-09-04 RX ADMIN — Medication 3 MILLILITER(S): at 12:17

## 2018-09-04 RX ADMIN — Medication 3 MILLILITER(S): at 06:46

## 2018-09-04 RX ADMIN — Medication 0.38 GRAM(S): at 08:35

## 2018-09-04 RX ADMIN — Medication 100 MILLIGRAM(S): at 08:35

## 2018-09-04 NOTE — PHYSICAL THERAPY INITIAL EVALUATION ADULT - ADDITIONAL COMMENTS
Pt lives at home with spouse, +13 steps with b/l rails to enter, +13 steps inside with railing, PTA ind amb and ADls, pretty active, pt was a MD (pediatric cards)

## 2018-09-04 NOTE — DISCHARGE NOTE ADULT - MEDICATION SUMMARY - MEDICATIONS TO TAKE
I will START or STAY ON the medications listed below when I get home from the hospital:    Apriso 0.375 g oral capsule, extended release  -- 1 cap(s) by mouth 2 times a day  -- Indication: For IBD (inflammatory bowel disease)    valsartan 40 mg oral tablet  -- 1 tab(s) by mouth once a day  -- Indication: For HTN (hypertension)    allopurinol 100 mg oral tablet  -- 1 tab(s) by mouth once a day  -- Indication: For Gout    Crestor 5 mg oral tablet  -- 1 tab(s) by mouth once a day (at bedtime)  -- Indication: For HLD (hyperlipidemia)    ipratropium-albuterol 0.5 mg-2.5 mg/3 mLinhalation solution  -- 3 milliliter(s) inhaled every 6 hours, As Needed -for shortness of breath and/or wheezing   -- Indication: For Inhaler    montelukast 5 mg oral tablet, chewable  -- 1 tab(s) by mouth once a day  -- Indication: For Pneumonia    azithromycin 500 mg oral tablet  -- 1 tab(s) by mouth once a day   -- Do not take dairy products, antacids, or iron preparations within one hour of this medication.  Finish all this medication unless otherwise directed by prescriber.    -- Indication: For Pneumonia    CoQ10 300 mg oral capsule  -- 1 cap(s) by mouth once a day  -- Indication: For Vitamin    potassium citrate 10 mEq oral tablet, extended release  -- 1 tab(s) by mouth 3 times in the AM, 2 times in the PM  -- Indication: For Electrolyte replishment    Vitamin B-12 100 mcg oral tablet  -- 1 tab(s) by mouth once a day  -- Indication: For vitamin

## 2018-09-04 NOTE — DISCHARGE NOTE ADULT - CARE PLAN
Principal Discharge DX:	Legionella pneumonia  Goal:	Patient remains hemodynamically stable.  Assessment and plan of treatment:	Pneumonia is a lung infection that can cause a fever, cough, and trouble breathing.  Continue all antibiotics as ordered until complete.  Nutrition is important, eat small frequent meals.  Get lots of rest and drink fluids.  Call your health care provider upon arrival home from hospital and make a follow up appointment for one week.  If your cough worsens, you develop fever greater than 101', you have shaking chills, a fast heartbeat, trouble breathing and/or feel your are breathing much faster than usual, call your healthcare provider.  Make sure you wash your hands frequently.  Secondary Diagnosis:	HTN (hypertension)  Assessment and plan of treatment:	Low salt diet  Activity as tolerated.  Take all medication as prescribed.  Follow up with your medical doctor for routine blood pressure monitoring at your next visit.  Notify your doctor if you have any of the following symptoms:   Dizziness, Lightheadedness, Blurry vision, Headache, Chest pain, Shortness of breath  Secondary Diagnosis:	HLD (hyperlipidemia)  Assessment and plan of treatment:	Stable, continue with current medications.  Secondary Diagnosis:	IBD (inflammatory bowel disease)  Assessment and plan of treatment:	stable, continue with current medication.  Secondary Diagnosis:	Stage 3 chronic kidney disease  Goal:	please reconsult your PMD prior to starting your Diovan.  Assessment and plan of treatment:	Avoid taking (NSAIDs) - (ex: Ibuprofen, Advil, Celebrex, Naprosyn)  Avoid taking any nephrotoxic agents (can harm kidneys) - Intravenous contrast for diagnostic testing, combination cold medications.  Have all medications adjusted for your renal function by your Health Care Provider.  Blood pressure control is important.  Take all medication as prescribed. Principal Discharge DX:	Legionella pneumonia  Goal:	Patient remains hemodynamically stable.  Assessment and plan of treatment:	Pneumonia is a lung infection that can cause a fever, cough, and trouble breathing.  Continue all antibiotics as ordered until complete.  Nutrition is important, eat small frequent meals.  Get lots of rest and drink fluids.  Call your health care provider upon arrival home from hospital and make a follow up appointment for one week.  If your cough worsens, you develop fever greater than 101', you have shaking chills, a fast heartbeat, trouble breathing and/or feel your are breathing much faster than usual, call your healthcare provider.  Make sure you wash your hands frequently.  Secondary Diagnosis:	HTN (hypertension)  Assessment and plan of treatment:	Low salt diet  Activity as tolerated.  Take all medication as prescribed.  Follow up with your medical doctor for routine blood pressure monitoring at your next visit.  Notify your doctor if you have any of the following symptoms:   Dizziness, Lightheadedness, Blurry vision, Headache, Chest pain, Shortness of breath  Secondary Diagnosis:	HLD (hyperlipidemia)  Assessment and plan of treatment:	Stable, continue with current medications.  Secondary Diagnosis:	IBD (inflammatory bowel disease)  Assessment and plan of treatment:	stable, continue with current medication.  Secondary Diagnosis:	Stage 3 chronic kidney disease  Goal:	Stable  Assessment and plan of treatment:	Avoid taking (NSAIDs) - (ex: Ibuprofen, Advil, Celebrex, Naprosyn)  Avoid taking any nephrotoxic agents (can harm kidneys) - Intravenous contrast for diagnostic testing, combination cold medications.  Have all medications adjusted for your renal function by your Health Care Provider.  Blood pressure control is important.  Take all medication as prescribed.

## 2018-09-04 NOTE — PROGRESS NOTE ADULT - PROBLEM SELECTOR PLAN 1
Follow up blood cxx, urine legionella   Zosyn d/c'd, continue CTX and Azithromycin   Standing Duoneb   Incentive Spirometry
Pt with RLL PNA, found to (+) legionella  - continue IV azithromycin (day 4) til tmrw and then dc home with po azithromycin for total 7-10 day course  - fu ID recs  - NC O2 as needed
Pt with RLL PNA, found to (+) legionella  - continue IV azithromycin today and if pt remains afebrile x 48hrs, switch to po  - fu ID recs  - NC O2 as needed
Pt with RLL PNA, found to (+) legionella  - continue ceftriaxone, azithromycin  - fu ID recs  - NC O2 as needed
Pt with RLL PNA, found to (+) legionella  - DC home with total course of 10days of Azithromycin

## 2018-09-04 NOTE — PROGRESS NOTE ADULT - PROBLEM SELECTOR PROBLEM 4
HTN (hypertension)
Stage 3 chronic kidney disease

## 2018-09-04 NOTE — DISCHARGE NOTE ADULT - HOSPITAL COURSE
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  Plan: Pt with RLL PNA, found to (+) legionella  - dc home with po azithromycin for total 10 day course       Problem/Plan - 2:  ·  Problem: Sepsis, due to unspecified organism.  Plan: Pt met sirs criteria (leukocytosis, tachycardia, fever) likely 2/2 legionella PNA  - monitor fever and tylenol prn  - abx as above  - Ucx, Bcx NGTD.      Problem/Plan - 3:  ·  Problem: IBD (inflammatory bowel disease).  Plan: Hx of IBD on Mesalame, cont home dose Mesalamine   Diet as tolerated.      Problem/Plan - 4:  ·  Problem: Stage 3 chronic kidney disease.  Plan: Stable  Avoid nephrotoxic agents   Renally dose medications.      Problem/Plan - 5:  ·  Problem: HTN (hypertension).  Plan: Continue to hold home meds due to KACEY     Problem/Plan - 6:  Problem: HLD (hyperlipidemia). Plan: Cont home dose Crestor.      Pt stable discharged home with outpatient follow up with PMD within 1 week of discharge for repeat BMP. 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  Plan: Pt with RLL PNA, found to (+) legionella  - dc home with po azithromycin for total 10 day course       Problem/Plan - 2:  ·  Problem: Sepsis, due to unspecified organism.  Plan: Pt met sirs criteria (leukocytosis, tachycardia, fever) likely 2/2 legionella PNA  - monitor fever and tylenol prn  - abx as above  - Ucx, Bcx NGTD.      Problem/Plan - 3:  ·  Problem: IBD (inflammatory bowel disease).  Plan: Hx of IBD on Mesalame, cont home dose Mesalamine   Diet as tolerated.      Problem/Plan - 4:  ·  Problem: Stage 3 chronic kidney disease.  Plan: Stable  Avoid nephrotoxic agents   Renally dose medications.      Problem/Plan - 5:  ·  Problem: HTN (hypertension).  Plan: Continue to home meds     Problem/Plan - 6:  Problem: HLD (hyperlipidemia). Plan: Cont home dose Crestor.      Pt stable discharged home with outpatient follow up with PMD within 1 week of discharge. 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  Plan: Pt with RLL PNA, found to (+) legionella  - dc home with po azithromycin for total 10 day course (4-6 more days of Abx)  - Follow up with PMD with repeat CXR  - Follow up with repeat sputum cx  - DuoNebs prn for wheezing/Sob       Problem/Plan - 2:  ·  Problem: Sepsis, due to unspecified organism.  Plan: Pt met sepsis criteria (leukocytosis, tachycardia, fever) likely 2/2 legionella PNA  - monitor fever and tylenol prn  - abx as above  - Ucx, Bcx NGTD.      Problem/Plan - 3:  ·  Problem: IBD (inflammatory bowel disease).  Plan: Hx of IBD on Mesalame, cont home dose Mesalamine   Diet as tolerated.      Problem/Plan - 4:  ·  Problem: Stage 3 chronic kidney disease.  Plan: Stable  Avoid nephrotoxic agents   Renally dose medications.      Problem/Plan - 5:  ·  Problem: HTN (hypertension).  Plan: Continue to home meds     Problem/Plan - 6:  Problem: HLD (hyperlipidemia). Plan: Cont home dose Crestor.      Pt stable discharged home with outpatient follow up with PMD within 1 week of discharge.

## 2018-09-04 NOTE — PROGRESS NOTE ADULT - PROBLEM SELECTOR PLAN 5
Cont home dose Crestor
Continue to hold home anti-HTN in the setting of sepsis
-Continue home medications

## 2018-09-04 NOTE — DISCHARGE NOTE ADULT - VISION (WITH CORRECTIVE LENSES IF THE PATIENT USUALLY WEARS THEM):
Normal vision: sees adequately in most situations; can see medication labels, newsprint
DISPLAY PLAN FREE TEXT
DISPLAY PLAN FREE TEXT

## 2018-09-04 NOTE — PROGRESS NOTE ADULT - PROBLEM SELECTOR PLAN 2
Hx of IBD on Mesalamine- was having diarrhea recently but no BMs since taking loperamide.    Cont home dose Mesalamine   Diet as tolerated
Pt met sirs criteria (leukocytosis, tachycardia, fever) likely 2/2 legionella PNA  - monitor fever and tylenol prn  - abx as above  - Ucx, Bcx NGTD
-Resolved  -Pt met Sepsis criteria (leukocytosis, tachycardia, fever) likely 2/2 legionella PNA  - abx as above  - Ucx, Bcx NGTD

## 2018-09-04 NOTE — PROGRESS NOTE ADULT - PROBLEM SELECTOR PROBLEM 6
HLD (hyperlipidemia)
Prophylactic measure

## 2018-09-04 NOTE — PROGRESS NOTE ADULT - SUBJECTIVE AND OBJECTIVE BOX
Patient is a 73y old  Male who presents with a chief complaint of Diarrhea (04 Sep 2018 10:57)      SUBJECTIVE / OVERNIGHT EVENTS:  Pt seen and examined at bedside. No acute events overnight. Pt denies chest pain, sob, palpitations, N/V. Pt continues to have loose BMs. He denied abdominal Pain.    ROS:  All other review of systems negative    Allergies    No Known Allergies    Intolerances        MEDICATIONS  (STANDING):  ALBUTerol/ipratropium for Nebulization 3 milliLiter(s) Nebulizer every 6 hours  allopurinol 100 milliGRAM(s) Oral daily  atorvastatin 20 milliGRAM(s) Oral at bedtime  azithromycin  IVPB 500 milliGRAM(s) IV Intermittent every 24 hours  enoxaparin Injectable 30 milliGRAM(s) SubCutaneous daily  mesalamine ER (24-Hour) Capsule 0.375 Gram(s) Oral two times a day  potassium citrate 30 milliEquivalent(s) Oral <User Schedule>  potassium citrate 20 milliEquivalent(s) Oral <User Schedule>    MEDICATIONS  (PRN):  acetaminophen   Tablet 650 milliGRAM(s) Oral every 6 hours PRN For Temp greater than 38 C (100.4 F)  guaiFENesin/dextromethorphan  Syrup 10 milliLiter(s) Oral three times a day PRN Cough      Vital Signs Last 24 Hrs  T(C): 36.6 (04 Sep 2018 04:54), Max: 36.9 (03 Sep 2018 17:11)  T(F): 97.9 (04 Sep 2018 04:54), Max: 98.5 (03 Sep 2018 17:11)  HR: 65 (04 Sep 2018 04:54) (65 - 76)  BP: 129/86 (04 Sep 2018 04:54) (118/77 - 131/76)  BP(mean): --  RR: 18 (04 Sep 2018 04:54) (18 - 18)  SpO2: 97% (04 Sep 2018 04:54) (94% - 97%)  CAPILLARY BLOOD GLUCOSE        I&O's Summary    03 Sep 2018 07:01  -  04 Sep 2018 07:00  --------------------------------------------------------  IN: 840 mL / OUT: 0 mL / NET: 840 mL        PHYSICAL EXAM:  GENERAL: NAD, well-developed  HEAD:  Atraumatic, Normocephalic  EYES: EOMI, PERRLA, conjunctiva and sclera clear  NECK: Supple, No JVD  CHEST/LUNG: Clear to auscultation bilaterally; No wheeze  HEART: Regular rate and rhythm; No murmurs, rubs, or gallops  ABDOMEN: Soft, Nontender, Nondistended; Bowel sounds present  EXTREMITIES:  2+ Peripheral Pulses, No clubbing, cyanosis, or edema  NEUROLOGY: AAOx3, non-focal  PSYCH: calm  SKIN: No rashes or lesions    LABS:                        12.5   3.4   )-----------( 286      ( 04 Sep 2018 06:09 )             37.8     09-04    137  |  99  |  19  ----------------------------<  115<H>  4.7   |  25  |  1.40<H>    Ca    9.7      04 Sep 2018 06:09                RADIOLOGY & ADDITIONAL TESTS:    Imaging Personally Reviewed:    Consultant(s) Notes Reviewed:      Care Discussed with Consultants/Other Providers:    Case Discussed with Family:    Goals of Care:

## 2018-09-04 NOTE — PHYSICAL THERAPY INITIAL EVALUATION ADULT - PERTINENT HX OF CURRENT PROBLEM, REHAB EVAL
Pt is a 73 y.o. man admitted to [facility] on 8/30/18 with 3 day hx of fever and explosive diarrhea. Pt was in [facility], +fevers, chills, diffuse sweating, explosive diarrhea described as watery and brown without blood. He reports feeling "incredibly" fatigued with rigors, nauseous with retching but nothing coming up, an intermittent dry cough, and sinus congestion.

## 2018-09-04 NOTE — DISCHARGE NOTE ADULT - CARE PROVIDER_API CALL
Adam Irene), Internal Medicine  [address], [place] [id number]  Phone: [phone]  Fax: [phone]

## 2018-09-04 NOTE — PHYSICAL THERAPY INITIAL EVALUATION ADULT - DISCHARGE DISPOSITION, PT EVAL
home w/ home PT/DC home with home PT services for general strengthening, to increase endurance, address fall prevention, perform balance training, safety assessment of home environment, and to restore pt's prior level of function, provided std cane at bedside for support, spouse to assist as needed, pt appreciative of PT, CM aware.

## 2018-09-04 NOTE — DISCHARGE NOTE ADULT - HOME CARE AGENCY
[facility] [phone]. RN visit to assess for home P/T. For start of care the day after discharge.

## 2018-09-04 NOTE — DISCHARGE NOTE ADULT - PLAN OF CARE
Patient remains hemodynamically stable. Pneumonia is a lung infection that can cause a fever, cough, and trouble breathing.  Continue all antibiotics as ordered until complete.  Nutrition is important, eat small frequent meals.  Get lots of rest and drink fluids.  Call your health care provider upon arrival home from hospital and make a follow up appointment for one week.  If your cough worsens, you develop fever greater than 101', you have shaking chills, a fast heartbeat, trouble breathing and/or feel your are breathing much faster than usual, call your healthcare provider.  Make sure you wash your hands frequently. Low salt diet  Activity as tolerated.  Take all medication as prescribed.  Follow up with your medical doctor for routine blood pressure monitoring at your next visit.  Notify your doctor if you have any of the following symptoms:   Dizziness, Lightheadedness, Blurry vision, Headache, Chest pain, Shortness of breath Stable, continue with current medications. stable, continue with current medication. please reconsult your PMD prior to starting your Diovan. Avoid taking (NSAIDs) - (ex: Ibuprofen, Advil, Celebrex, Naprosyn)  Avoid taking any nephrotoxic agents (can harm kidneys) - Intravenous contrast for diagnostic testing, combination cold medications.  Have all medications adjusted for your renal function by your Health Care Provider.  Blood pressure control is important.  Take all medication as prescribed. Stable

## 2018-09-04 NOTE — PHYSICAL THERAPY INITIAL EVALUATION ADULT - PRECAUTIONS/LIMITATIONS, REHAB EVAL
He also reports an episode of left sided chest pain 2 days ago without radiation or any crushing/burning pain. However this chest pain is only with coughing and is not pleuritic.  He denies any abdominal pain, changes in urinary frequency or blood in the urine. Pt with a PMH of Crohn's well managed with mesalamine, HTN, HLD, asthma, kidney stones. Hospital course: CT abd/pelvis: Right lower lobe pneumonia. US abd: No evidence of acute cholecystitis. + Sepsis due to Legionella pneumonia.

## 2018-09-04 NOTE — DISCHARGE NOTE ADULT - ADDITIONAL INSTRUCTIONS
Follow up with your PMD within 1 week of discharge, have your BMP drawn to reassess your kidney functions and restarting your Blood pressure medication. Follow up with your PMD within 1 week of discharge.

## 2018-09-04 NOTE — PROGRESS NOTE ADULT - PROBLEM SELECTOR PLAN 7
Diet: Regular, as tolerated   DVT prophylaxis: Lovenox renally dosed  Dispo: home
Diet: Regular, as tolerated   DVT prophylaxis: Lovenox renally dosed
Diet: Regular, as tolerated   DVT prophylaxis: Lovenox renally dosed
Diet: Regular, as tolerated   DVT prophylaxis: Lovenox renally dosed  Dispo: home

## 2018-09-04 NOTE — PROGRESS NOTE ADULT - PROBLEM SELECTOR PLAN 6
Cont home dose Crestor
Diet: Regular, as tolerated   DVT prophylaxis: Lovenox renally dosed

## 2018-09-04 NOTE — DISCHARGE NOTE ADULT - SECONDARY DIAGNOSIS.
HTN (hypertension) HLD (hyperlipidemia) IBD (inflammatory bowel disease) Stage 3 chronic kidney disease

## 2018-09-04 NOTE — PROGRESS NOTE ADULT - ASSESSMENT
73M IBD , HTN, CKD (baseline cr 1.8-1.9 per pt), BPH, hx nephrolithiasis p/w n/v, diarrhea, meets SIRS criteria,    -     ct and chest x-ray confirm lll pna  probably pneumococcus, but could be legionella with low sodium     he looks better despite fever, whic is lower, and no rigors.  continue ceftriaxone and zithromax  legionella urine ag pending  bc pending
73M IBD , HTN, CKD (baseline cr 1.8-1.9 per pt), BPH, hx nephrolithiasis p/w n/v, diarrhea,    Sepsis due to Legionella pneumonia.       Plan:   Stop Ceftriaxone   Continue with Azithromycin    plan discharge to home to complete 10 days of zithromax
73M IBD , HTN, CKD (baseline cr 1.8-1.9 per pt), BPH, hx nephrolithiasis p/w n/v, diarrhea,    Sepsis due to Legionella pneumonia.       Plan:   Stop Ceftriaxone   Continue with Azithromycin   Follow up prelim BCX, NTD.  GI PCR in lab.
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

## 2018-09-04 NOTE — PROGRESS NOTE ADULT - ATTENDING COMMENTS
Naheed Langston  Pager: [phone]. If no response or past 5 pm call [phone].
Naheed Langston  Pager: [phone]. If no response or past 5 pm call [phone].
Kat Alan MD  Division of Hospital Medicine  Pager: [phone]  Office: [phone]
Kat Alan MD  Division of Hospital Medicine  Pager: [phone]  Office: [phone]
Kat Alan MD  Division of Hospital Medicine  Pager: [phone]  Office: [phone]
More than 30mins were spent discussing discharge plan, instructions and follow up.

## 2018-09-04 NOTE — PROGRESS NOTE ADULT - PROBLEM SELECTOR PROBLEM 2
IBD (inflammatory bowel disease)
Sepsis, due to unspecified organism

## 2018-09-04 NOTE — PROGRESS NOTE ADULT - SUBJECTIVE AND OBJECTIVE BOX
infectious diseases progress note:    Patient is a 73y old  Male who presents with a chief complaint of Diarrhea (30 Aug 2018 20:15)        Sepsis        ROS:  CONSTITUTIONAL:  Negative fever or chills, feels well, good appetite  EYES:  Negative  blurry vision or double vision  CARDIOVASCULAR:  Negative for chest pain or palpitations  RESPIRATORY:  Negative for cough, wheezing, or SOB   GASTROINTESTINAL:  Negative for nausea, vomiting, diarrhea, constipation, or abdominal pain  GENITOURINARY:  Negative frequency, urgency or dysuria  NEUROLOGIC:  No headache, confusion, dizziness, lightheadedness    Allergies    No Known Allergies    Intolerances        ANTIBIOTICS/RELEVANT:  antimicrobials  azithromycin  IVPB 500 milliGRAM(s) IV Intermittent every 24 hours    immunologic:    OTHER:  acetaminophen   Tablet 650 milliGRAM(s) Oral every 6 hours PRN  ALBUTerol/ipratropium for Nebulization 3 milliLiter(s) Nebulizer every 6 hours  allopurinol 100 milliGRAM(s) Oral daily  atorvastatin 20 milliGRAM(s) Oral at bedtime  enoxaparin Injectable 30 milliGRAM(s) SubCutaneous daily  guaiFENesin/dextromethorphan  Syrup 10 milliLiter(s) Oral three times a day PRN  mesalamine ER (24-Hour) Capsule 0.375 Gram(s) Oral two times a day  potassium citrate 30 milliEquivalent(s) Oral <User Schedule>  potassium citrate 20 milliEquivalent(s) Oral <User Schedule>      Objective:  Vital Signs Last 24 Hrs  T(C): 36.6 (04 Sep 2018 04:54), Max: 36.9 (03 Sep 2018 17:11)  T(F): 97.9 (04 Sep 2018 04:54), Max: 98.5 (03 Sep 2018 17:11)  HR: 65 (04 Sep 2018 04:54) (65 - 76)  BP: 129/86 (04 Sep 2018 04:54) (118/77 - 135/81)  BP(mean): --  RR: 18 (04 Sep 2018 04:54) (18 - 18)  SpO2: 97% (04 Sep 2018 04:54) (94% - 97%)    PHYSICAL EXAM:  C   Extremities:no e/e/c        LABS:                        12.5   3.4   )-----------( 286      ( 04 Sep 2018 06:09 )             37.8     09-04    137  |  99  |  19  ----------------------------<  115<H>  4.7   |  25  |  1.40<H>    Ca    9.7      04 Sep 2018 06:09              MICROBIOLOGY:    RECENT CULTURES:  09-01 @ 13:30 .Stool Feces                GI PCR Results: NOT detected  *******Please Note:*******  GI panel PCR evaluates for:  Campylobacter, Plesiomonas shigelloides, Salmonella,  Vibrio, Yersinia enterocolitica, Enteroaggregative  Escherichia coli (EAEC), Enteropathogenic E.coli (EPEC),  Enterotoxigenic E. coli (ETEC) lt/st, Shiga-like  toxin-producing E. coli (STEC) stx1/stx2,  Shigella/ Enteroinvasive E. coli (EIEC), Cryptosporidium,  Cyclospora cayetanensis, Entamoeba histolytica,  Giardia lamblia, Adenovirus F 40/41, Astrovirus,  Norovirus GI/GII, Rotavirus A, Sapovirus    08-30 @ 18:55 .Urine Clean Catch (Midstream)                No growth    08-30 @ 17:50 .Blood Blood-Peripheral                No growth to date.          RESPIRATORY CULTURES:              RADIOLOGY & ADDITIONAL STUDIES:        Pager [phone]  After 5 pm/weekends or if no response :[phone]

## 2018-09-04 NOTE — DISCHARGE NOTE ADULT - PATIENT PORTAL LINK FT
You can access the [facility] Patient Portal, offered by [facility], by registering with the following website: [email or web address]

## 2018-09-04 NOTE — PROGRESS NOTE ADULT - PROBLEM SELECTOR PLAN 3
Hx of IBD on Mesalamine- was having diarrhea recently but no BMs since taking loperamide.    Cont home dose Mesalamine   Diet as tolerated
Stable  Avoid nephrotoxic agents   Renally dose medications
Hx of IBD on Mesalamine- was having diarrhea recently but no BMs since taking loperamide.    Cont home dose Mesalamine   Diet as tolerated

## 2018-09-04 NOTE — PROGRESS NOTE ADULT - PROBLEM SELECTOR PROBLEM 3
IBD (inflammatory bowel disease)
Stage 3 chronic kidney disease
IBD (inflammatory bowel disease)

## 2018-09-04 NOTE — PROGRESS NOTE ADULT - PROBLEM SELECTOR PROBLEM 1
Legionella pneumonia
Pneumonia, bacterial
Legionella pneumonia

## 2018-09-04 NOTE — PROGRESS NOTE ADULT - PROVIDER SPECIALTY LIST ADULT
Hospitalist
Infectious Disease
Infectious Disease
Internal Medicine
Infectious Disease
Internal Medicine

## 2018-09-04 NOTE — PROGRESS NOTE ADULT - PROBLEM SELECTOR PLAN 4
Continue to hold home anti-HTN in the setting of sepsis
Stable  Avoid nephrotoxic agents   Renally dose medications

## 2018-09-05 ENCOUNTER — RX RENEWAL (OUTPATIENT)
Age: 73
End: 2018-09-05

## 2018-09-05 ENCOUNTER — FORM ENCOUNTER (OUTPATIENT)
Age: 73
End: 2018-09-05

## 2018-09-06 ENCOUNTER — OUTPATIENT (OUTPATIENT)
Dept: OUTPATIENT SERVICES | Facility: HOSPITAL | Age: 73
LOS: 1 days | End: 2018-09-06
Payer: COMMERCIAL

## 2018-09-06 DIAGNOSIS — K61.0 ANAL ABSCESS: Chronic | ICD-10-CM

## 2018-09-06 DIAGNOSIS — J18.1 LOBAR PNEUMONIA, UNSPECIFIED ORGANISM: ICD-10-CM

## 2018-09-06 DIAGNOSIS — Z90.49 ACQUIRED ABSENCE OF OTHER SPECIFIED PARTS OF DIGESTIVE TRACT: Chronic | ICD-10-CM

## 2018-09-06 LAB
CULTURE RESULTS: SIGNIFICANT CHANGE UP
SPECIMEN SOURCE: SIGNIFICANT CHANGE UP

## 2018-09-06 PROCEDURE — 71046 X-RAY EXAM CHEST 2 VIEWS: CPT

## 2018-09-06 PROCEDURE — 71046 X-RAY EXAM CHEST 2 VIEWS: CPT | Mod: 26

## 2018-09-07 ENCOUNTER — APPOINTMENT (OUTPATIENT)
Dept: INTERNAL MEDICINE | Facility: CLINIC | Age: 73
End: 2018-09-07

## 2018-09-07 ENCOUNTER — APPOINTMENT (OUTPATIENT)
Dept: INTERNAL MEDICINE | Facility: CLINIC | Age: 73
End: 2018-09-07
Payer: COMMERCIAL

## 2018-09-07 VITALS — SYSTOLIC BLOOD PRESSURE: 122 MMHG | RESPIRATION RATE: 16 BRPM | DIASTOLIC BLOOD PRESSURE: 70 MMHG | HEART RATE: 78 BPM

## 2018-09-07 PROCEDURE — 99496 TRANSJ CARE MGMT HIGH F2F 7D: CPT

## 2018-09-07 NOTE — ASSESSMENT
[FreeTextEntry1] : Pt slowly improving from Legionaries pneumonia\par S\par Has atelectasis on CXR  - using nebulizer and spirometer\par To finish antibiotics\par To continue meds\par FU 2 weeks\par Will need another fu CXR  -but will reevaluate first.\par

## 2018-09-07 NOTE — HISTORY OF PRESENT ILLNESS
[FreeTextEntry1] : FU from [facility]  -Legionaries' disease\par Still washed out\par Eating\par Diarrhea resolved\par No fevers\par Appetite coming back

## 2018-09-07 NOTE — PHYSICAL EXAM
[No Acute Distress] : no acute distress [Well Nourished] : well nourished [Well Developed] : well developed [Well-Appearing] : well-appearing [Normal Sclera/Conjunctiva] : normal sclera/conjunctiva [Normal Oropharynx] : the oropharynx was normal [No JVD] : no jugular venous distention [Supple] : supple [No Lymphadenopathy] : no lymphadenopathy [No Respiratory Distress] : no respiratory distress  [No Accessory Muscle Use] : no accessory muscle use [Normal Rate] : normal rate  [Regular Rhythm] : with a regular rhythm [Normal S1, S2] : normal S1 and S2 [No Murmur] : no murmur heard [No Edema] : there was no peripheral edema [Soft] : abdomen soft [Non Tender] : non-tender [No HSM] : no HSM [Normal Supraclavicular Nodes] : no supraclavicular lymphadenopathy [Normal Posterior Cervical Nodes] : no posterior cervical lymphadenopathy [Normal Anterior Cervical Nodes] : no anterior cervical lymphadenopathy [No Focal Deficits] : no focal deficits [Alert and Oriented x3] : oriented to person, place, and time [de-identified] : coarse rales at right base to 1/3 up

## 2018-09-10 ENCOUNTER — INBOUND DOCUMENT (OUTPATIENT)
Age: 73
End: 2018-09-10

## 2018-09-16 LAB — CALPROTECTIN STL-MCNT: 62 UG/G — SIGNIFICANT CHANGE UP (ref 0–120)

## 2018-09-20 ENCOUNTER — APPOINTMENT (OUTPATIENT)
Dept: INTERNAL MEDICINE | Facility: CLINIC | Age: 73
End: 2018-09-20
Payer: COMMERCIAL

## 2018-09-20 VITALS
DIASTOLIC BLOOD PRESSURE: 65 MMHG | SYSTOLIC BLOOD PRESSURE: 120 MMHG | RESPIRATION RATE: 16 BRPM | HEART RATE: 76 BPM | WEIGHT: 174 LBS | TEMPERATURE: 98.6 F | HEIGHT: 67 IN | BODY MASS INDEX: 27.31 KG/M2

## 2018-09-20 PROCEDURE — 99213 OFFICE O/P EST LOW 20 MIN: CPT

## 2018-09-20 NOTE — ASSESSMENT
[FreeTextEntry1] : Pt doing much better - can go back to work next week.\par FU CXR - in next 1-2 weeks.\par

## 2018-09-20 NOTE — HISTORY OF PRESENT ILLNESS
[FreeTextEntry1] : FU for Legionaries pneumonia - doing better\par Getting some energy back\par No fevers\par Coughing a little.

## 2018-09-20 NOTE — PHYSICAL EXAM
[No Acute Distress] : no acute distress [Well Nourished] : well nourished [Well Developed] : well developed [Well-Appearing] : well-appearing [Normal Sclera/Conjunctiva] : normal sclera/conjunctiva [Normal Oropharynx] : the oropharynx was normal [Supple] : supple [No Lymphadenopathy] : no lymphadenopathy [No Respiratory Distress] : no respiratory distress  [Clear to Auscultation] : lungs were clear to auscultation bilaterally [No Accessory Muscle Use] : no accessory muscle use [Normal Rate] : normal rate  [Regular Rhythm] : with a regular rhythm [Normal S1, S2] : normal S1 and S2 [No Edema] : there was no peripheral edema [Soft] : abdomen soft [Non Tender] : non-tender [Non-distended] : non-distended [No HSM] : no HSM [No Focal Deficits] : no focal deficits [Alert and Oriented x3] : oriented to person, place, and time

## 2018-09-26 ENCOUNTER — FORM ENCOUNTER (OUTPATIENT)
Age: 73
End: 2018-09-26

## 2018-09-27 ENCOUNTER — OUTPATIENT (OUTPATIENT)
Dept: OUTPATIENT SERVICES | Facility: HOSPITAL | Age: 73
LOS: 1 days | End: 2018-09-27
Payer: COMMERCIAL

## 2018-09-27 ENCOUNTER — APPOINTMENT (OUTPATIENT)
Dept: RADIOLOGY | Facility: HOSPITAL | Age: 73
End: 2018-09-27

## 2018-09-27 DIAGNOSIS — K61.0 ANAL ABSCESS: Chronic | ICD-10-CM

## 2018-09-27 DIAGNOSIS — Z90.49 ACQUIRED ABSENCE OF OTHER SPECIFIED PARTS OF DIGESTIVE TRACT: Chronic | ICD-10-CM

## 2018-09-27 DIAGNOSIS — A48.1 LEGIONNAIRES' DISEASE: ICD-10-CM

## 2018-09-27 PROCEDURE — 71046 X-RAY EXAM CHEST 2 VIEWS: CPT | Mod: 26

## 2018-10-03 ENCOUNTER — APPOINTMENT (OUTPATIENT)
Dept: OTOLARYNGOLOGY | Facility: CLINIC | Age: 73
End: 2018-10-03
Payer: COMMERCIAL

## 2018-10-03 VITALS
HEIGHT: 67 IN | DIASTOLIC BLOOD PRESSURE: 85 MMHG | BODY MASS INDEX: 27.47 KG/M2 | WEIGHT: 175 LBS | HEART RATE: 63 BPM | SYSTOLIC BLOOD PRESSURE: 148 MMHG

## 2018-10-03 PROCEDURE — 92557 COMPREHENSIVE HEARING TEST: CPT

## 2018-10-03 PROCEDURE — 92567 TYMPANOMETRY: CPT

## 2018-10-03 PROCEDURE — 99213 OFFICE O/P EST LOW 20 MIN: CPT | Mod: 25

## 2018-10-05 ENCOUNTER — APPOINTMENT (OUTPATIENT)
Dept: DERMATOLOGY | Facility: CLINIC | Age: 73
End: 2018-10-05
Payer: COMMERCIAL

## 2018-10-05 DIAGNOSIS — L82.0 INFLAMED SEBORRHEIC KERATOSIS: ICD-10-CM

## 2018-10-05 PROCEDURE — 17110 DESTRUCTION B9 LES UP TO 14: CPT

## 2018-10-05 PROCEDURE — 99213 OFFICE O/P EST LOW 20 MIN: CPT | Mod: 25

## 2018-10-11 NOTE — ED ADULT NURSE NOTE - GASTROINTESTINAL ASSESSMENT
Spoke with danitza at [facility] to inform of new policy new opioid rx only a 7 day supply is filled and rest is disguarded.   WDL

## 2018-10-21 ENCOUNTER — TRANSCRIPTION ENCOUNTER (OUTPATIENT)
Age: 73
End: 2018-10-21

## 2018-11-20 ENCOUNTER — FORM ENCOUNTER (OUTPATIENT)
Age: 73
End: 2018-11-20

## 2018-11-20 ENCOUNTER — RX RENEWAL (OUTPATIENT)
Age: 73
End: 2018-11-20

## 2018-11-21 ENCOUNTER — APPOINTMENT (OUTPATIENT)
Dept: RADIOLOGY | Facility: HOSPITAL | Age: 73
End: 2018-11-21

## 2018-11-21 ENCOUNTER — OUTPATIENT (OUTPATIENT)
Dept: OUTPATIENT SERVICES | Facility: HOSPITAL | Age: 73
LOS: 1 days | End: 2018-11-21
Payer: COMMERCIAL

## 2018-11-21 DIAGNOSIS — Z90.49 ACQUIRED ABSENCE OF OTHER SPECIFIED PARTS OF DIGESTIVE TRACT: Chronic | ICD-10-CM

## 2018-11-21 DIAGNOSIS — J18.1 LOBAR PNEUMONIA, UNSPECIFIED ORGANISM: ICD-10-CM

## 2018-11-21 DIAGNOSIS — K61.0 ANAL ABSCESS: Chronic | ICD-10-CM

## 2018-11-21 PROCEDURE — 71046 X-RAY EXAM CHEST 2 VIEWS: CPT | Mod: 26

## 2018-12-16 ENCOUNTER — FORM ENCOUNTER (OUTPATIENT)
Age: 73
End: 2018-12-16

## 2018-12-17 ENCOUNTER — APPOINTMENT (OUTPATIENT)
Dept: RADIOLOGY | Facility: HOSPITAL | Age: 73
End: 2018-12-17

## 2018-12-17 ENCOUNTER — OUTPATIENT (OUTPATIENT)
Dept: OUTPATIENT SERVICES | Facility: HOSPITAL | Age: 73
LOS: 1 days | End: 2018-12-17
Payer: COMMERCIAL

## 2018-12-17 DIAGNOSIS — K61.0 ANAL ABSCESS: Chronic | ICD-10-CM

## 2018-12-17 DIAGNOSIS — Z90.49 ACQUIRED ABSENCE OF OTHER SPECIFIED PARTS OF DIGESTIVE TRACT: Chronic | ICD-10-CM

## 2018-12-17 DIAGNOSIS — R05 COUGH: ICD-10-CM

## 2018-12-17 PROCEDURE — 71046 X-RAY EXAM CHEST 2 VIEWS: CPT | Mod: 26

## 2019-01-04 ENCOUNTER — NON-APPOINTMENT (OUTPATIENT)
Age: 74
End: 2019-01-04

## 2019-01-04 ENCOUNTER — APPOINTMENT (OUTPATIENT)
Dept: PEDIATRIC ALLERGY IMMUNOLOGY | Facility: CLINIC | Age: 74
End: 2019-01-04
Payer: COMMERCIAL

## 2019-01-04 VITALS
HEART RATE: 69 BPM | SYSTOLIC BLOOD PRESSURE: 156 MMHG | HEIGHT: 67 IN | DIASTOLIC BLOOD PRESSURE: 78 MMHG | OXYGEN SATURATION: 98 % | WEIGHT: 170 LBS | BODY MASS INDEX: 26.68 KG/M2

## 2019-01-04 DIAGNOSIS — R05 COUGH: ICD-10-CM

## 2019-01-04 DIAGNOSIS — N20.0 CALCULUS OF KIDNEY: ICD-10-CM

## 2019-01-04 DIAGNOSIS — J45.909 UNSPECIFIED ASTHMA, UNCOMPLICATED: ICD-10-CM

## 2019-01-04 DIAGNOSIS — J30.89 OTHER ALLERGIC RHINITIS: ICD-10-CM

## 2019-01-04 PROCEDURE — 94010 BREATHING CAPACITY TEST: CPT

## 2019-01-04 PROCEDURE — 36415 COLL VENOUS BLD VENIPUNCTURE: CPT

## 2019-01-04 PROCEDURE — 99245 OFF/OP CONSLTJ NEW/EST HI 55: CPT | Mod: 25

## 2019-01-04 RX ORDER — AZELASTINE HYDROCHLORIDE 205.5 UG/1
0.15 SPRAY, METERED NASAL
Qty: 1 | Refills: 2 | Status: COMPLETED | COMMUNITY
Start: 2017-02-16 | End: 2019-01-04

## 2019-01-04 NOTE — CONSULT LETTER
[Dear  ___] : Dear  [unfilled], [Consult Letter:] : I had the pleasure of evaluating your patient, [unfilled]. [Thank you for referring [unfilled] for consultation for _____] : Thank you for referring [unfilled] for consultation for [unfilled] [Please see my note below.] : Please see my note below. [Consult Closing:] : Thank you very much for allowing me to participate in the care of this patient.  If you have any questions, please do not hesitate to contact me. [Sincerely,] : Sincerely, [FreeTextEntry3] : Blake Hawk MD\par  for Academic Affairs, Department of Pediatrics\par Chief, Division of Allergy/Immunology\par [facility]\par \par Dann Magaña Professor of Pediatrics, Professor of Molecular Medicine\par [employer] [facility]\par \par

## 2019-01-04 NOTE — PHYSICAL EXAM
[Alert] : alert [Well Nourished] : well nourished [Healthy Appearance] : healthy appearance [No Acute Distress] : no acute distress [Well Developed] : well developed [Normal Pupil & Iris Size/Symmetry] : normal pupil and iris size and symmetry [No Discharge] : no discharge [No Photophobia] : no photophobia [Sclera Not Icteric] : sclera not icteric [Normal TMs] : both tympanic membranes were normal [Normal Nasal Mucosa] : the nasal mucosa was normal [Normal Lips/Tongue] : the lips and tongue were normal [Normal Outer Ear/Nose] : the ears and nose were normal in appearance [Normal Tonsils] : normal tonsils [No Thrush] : no thrush [Normal Dentition] : normal dentition [No Oral Lesions or Ulcers] : no oral lesions or ulcers [Supple] : the neck was supple [Normal Rate and Effort] : normal respiratory rhythm and effort [Normal Palpation] : palpation of the chest revealed no abnormalities [No Crackles] : no crackles [No Retractions] : no retractions [Bilateral Audible Breath Sounds] : bilateral audible breath sounds [Normal Rate] : heart rate was normal  [Normal S1, S2] : normal S1 and S2 [No murmur] : no murmur [Regular Rhythm] : with a regular rhythm [Soft] : abdomen soft [Not Tender] : non-tender [Not Distended] : not distended [No HSM] : no hepato-splenomegaly [Normal Cervical Lymph Nodes] : cervical [Normal Axillary Lumph Nodes] : axillary [Skin Intact] : skin intact  [No Rash] : no rash [No Skin Lesions] : no skin lesions [No Joint Swelling or Erythema] : no joint swelling or erythema [No clubbing] : no clubbing [No Edema] : no edema [No Cyanosis] : no cyanosis [Normal Mood] : mood was normal [Normal Affect] : affect was normal [Alert, Awake, Oriented as Age-Appropriate] : alert, awake, oriented as age appropriate [No Motor Deficits] : the motor exam was normal

## 2019-01-04 NOTE — REVIEW OF SYSTEMS
[Nasal Congestion] : nasal congestion [Nl] : Endocrine [Received Influenza Vaccine this Past Year] : patient has received the Influenza vaccine this past year [Fatigue] : no fatigue [Fever] : no fever [Wgt Loss (___ Lbs)] : no recent weight loss [Penile Lesion] : no penile lesion [Penile Discharge] : no penile discharge [FreeTextEntry3] : stigmatism. ocular pressure normal; [FreeTextEntry4] :  slight hearing loss; sneezing [FreeTextEntry5] : nonspecific ST changes  [FreeTextEntry6] : asthma [FreeTextEntry7] : Crohn disease; polyps removed, non cancerous [de-identified] : cherry hemangiomas [de-identified] : uses viagra [de-identified] : seasonal allergies, [place] [Immunizations are up to date] : Immunizations are not up to date [FreeTextEntry1] : Varicella vaccine not given

## 2019-01-04 NOTE — HISTORY OF PRESENT ILLNESS
[de-identified] : 73 yr old s/p bilateral pneumonia for Legionaries disease (cough, fever, fatigue, diarrhea) in August 26th. Pt was hospital for 5 days, out from work for 1 months. Pt treated with IV clarithromycin for 6 days and then Po for 10 days.  Pt did not feel well for 5 months. Decrease in VC, no wheezing.  Pt was not having any wheezing, but patient has cold air/infection induced asthma. Pt had allergic rhinitis, was treated with IT for rag weed, tree pollens, for from 12-20 years.  Pt does take nasal steroids during fall season.  Pt was better, but had bronchitis 3 wks ago Rxed with clarithromycin and prednisone 10mg/day x 6 days. Pt otherwise well since then.

## 2019-01-08 LAB
25(OH)D3 SERPL-MCNC: 37.9 NG/ML
ALBUMIN SERPL ELPH-MCNC: 5.2 G/DL
ALP BLD-CCNC: 80 U/L
ALT SERPL-CCNC: 28 U/L
ANION GAP SERPL CALC-SCNC: 15 MMOL/L
AST SERPL-CCNC: 20 U/L
BASOPHILS # BLD AUTO: 0.05 K/UL
BASOPHILS NFR BLD AUTO: 0.8 %
BILIRUB SERPL-MCNC: 0.5 MG/DL
BUN SERPL-MCNC: 25 MG/DL
CALCIUM SERPL-MCNC: 10.3 MG/DL
CD16+CD56+ CELLS # BLD: 221 /UL
CD16+CD56+ CELLS NFR BLD: 15 %
CD19 CELLS NFR BLD: 98 /UL
CD3 CELLS # BLD: 1133 /UL
CD3 CELLS NFR BLD: 75 %
CD3+CD4+ CELLS # BLD: 686 /UL
CD3+CD4+ CELLS NFR BLD: 45 %
CD3+CD4+ CELLS/CD3+CD8+ CLL SPEC: 1.56 RATIO
CD3+CD8+ CELLS # SPEC: 439 /UL
CD3+CD8+ CELLS NFR BLD: 29 %
CELLS.CD3-CD19+/CELLS IN BLOOD: 7 %
CH50 SERPL-MCNC: >100 U/ML
CHLORIDE SERPL-SCNC: 103 MMOL/L
CO2 SERPL-SCNC: 25 MMOL/L
COMPLEMENT, ALTERNATE PATHWAY (AH50): 101
CREAT SERPL-MCNC: 1.36 MG/DL
DEPRECATED KAPPA LC FREE/LAMBDA SER: 1.13 RATIO
EOSINOPHIL # BLD AUTO: 0.07 K/UL
EOSINOPHIL NFR BLD AUTO: 1.1 %
GLUCOSE SERPL-MCNC: 96 MG/DL
HCT VFR BLD CALC: 47.6 %
HGB BLD-MCNC: 15.5 G/DL
IGA SER QL IEP: 138 MG/DL
IGG SER QL IEP: 963 MG/DL
IGM SER QL IEP: 29 MG/DL
IMM GRANULOCYTES NFR BLD AUTO: 0.5 %
KAPPA LC CSF-MCNC: 1.1 MG/DL
KAPPA LC SERPL-MCNC: 1.24 MG/DL
LYMPHOCYTES # BLD AUTO: 1.45 K/UL
LYMPHOCYTES NFR BLD AUTO: 22.7 %
MAN DIFF?: NORMAL
MCHC RBC-ENTMCNC: 29.4 PG
MCHC RBC-ENTMCNC: 32.6 GM/DL
MCV RBC AUTO: 90.2 FL
MEV IGG FLD QL IA: >300 AU/ML
MEV IGG+IGM SER-IMP: POSITIVE
MONOCYTES # BLD AUTO: 0.55 K/UL
MONOCYTES NFR BLD AUTO: 8.6 %
MUV AB SER-ACNC: NEGATIVE
MUV IGG SER QL IA: <5 AU/ML
NEUTROPHILS # BLD AUTO: 4.24 K/UL
NEUTROPHILS NFR BLD AUTO: 66.3 %
PLATELET # BLD AUTO: 189 K/UL
POTASSIUM SERPL-SCNC: 5 MMOL/L
PROT SERPL-MCNC: 7.4 G/DL
PSA SERPL-MCNC: 3.99 NG/ML
RBC # BLD: 5.28 M/UL
RBC # FLD: 13.6 %
RUBV IGG FLD-ACNC: 22.1 INDEX
RUBV IGG SER-IMP: POSITIVE
SODIUM SERPL-SCNC: 143 MMOL/L
TOTAL IGE SMQN RAST: 13 KU/L
VZV AB TITR SER: POSITIVE
VZV IGG SER IF-ACNC: 390.2 INDEX
WBC # FLD AUTO: 6.39 K/UL

## 2019-01-10 ENCOUNTER — APPOINTMENT (OUTPATIENT)
Dept: PEDIATRIC ALLERGY IMMUNOLOGY | Facility: CLINIC | Age: 74
End: 2019-01-10

## 2019-01-10 LAB
HAEM INFLU B AB SER-MCNC: 3.55 MG/L
LPT PW BLD-NRATE: NORMAL

## 2019-01-19 LAB
DEPRECATED S PNEUM 1 IGG SER-MCNC: 24.5 MCG/ML
DEPRECATED S PNEUM12 AB SER-ACNC: NORMAL
DEPRECATED S PNEUM14 AB SER-ACNC: 15.3 MCG/ML
DEPRECATED S PNEUM17 IGG SER IA-MCNC: 37.3 MCG/ML
DEPRECATED S PNEUM18 IGG SER IA-MCNC: 17.9 MCG/ML
DEPRECATED S PNEUM19 IGG SER-MCNC: 32.9 MCG/ML
DEPRECATED S PNEUM19 IGG SER-MCNC: 34.5 MCG/ML
DEPRECATED S PNEUM2 IGG SER-MCNC: 8.2 MCG/ML
DEPRECATED S PNEUM20 IGG SER-MCNC: 64.1 MCG/ML
DEPRECATED S PNEUM22 IGG SER-MCNC: 5.9 MCG/ML
DEPRECATED S PNEUM23 AB SER-ACNC: 7.9 MCG/ML
DEPRECATED S PNEUM3 AB SER-ACNC: 3.2 MCG/ML
DEPRECATED S PNEUM34 IGG SER-MCNC: 2.6 MCG/ML
DEPRECATED S PNEUM4 AB SER-ACNC: 25.4 MCG/ML
DEPRECATED S PNEUM5 IGG SER-MCNC: 94.9 MCG/ML
DEPRECATED S PNEUM6 IGG SER-MCNC: 121.7 MCG/ML
DEPRECATED S PNEUM7 IGG SER-ACNC: 39.9 MCG/ML
DEPRECATED S PNEUM8 AB SER-ACNC: 2.3 MCG/ML
DEPRECATED S PNEUM9 AB SER-ACNC: 6.2 MCG/ML
DEPRECATED S PNEUM9 IGG SER-MCNC: 55.2 MCG/ML
LPT PW BLD-NRATE: ABNORMAL
POLIO 1 TITER BY  NEUTRALIZATION: NORMAL
POLIO 3 TITER BY  NEUTRALIZATION: NORMAL
STREPTOCOCCUS PNEUMONIAE SEROTYPE 11A: 45.4 MCG/ML
STREPTOCOCCUS PNEUMONIAE SEROTYPE 15B: 26.5 MCG/ML
STREPTOCOCCUS PNEUMONIAE SEROTYPE 33F: 12.1 MCG/ML

## 2019-03-01 ENCOUNTER — RX RENEWAL (OUTPATIENT)
Age: 74
End: 2019-03-01

## 2019-04-18 ENCOUNTER — RX RENEWAL (OUTPATIENT)
Age: 74
End: 2019-04-18

## 2019-05-10 ENCOUNTER — APPOINTMENT (OUTPATIENT)
Dept: INFECTIOUS DISEASE | Facility: CLINIC | Age: 74
End: 2019-05-10

## 2019-07-10 ENCOUNTER — RX RENEWAL (OUTPATIENT)
Age: 74
End: 2019-07-10

## 2019-07-12 ENCOUNTER — APPOINTMENT (OUTPATIENT)
Dept: OPHTHALMOLOGY | Facility: CLINIC | Age: 74
End: 2019-07-12
Payer: COMMERCIAL

## 2019-07-12 ENCOUNTER — NON-APPOINTMENT (OUTPATIENT)
Age: 74
End: 2019-07-12

## 2019-07-12 PROCEDURE — 92004 COMPRE OPH EXAM NEW PT 1/>: CPT

## 2019-07-12 PROCEDURE — 92060 SENSORIMOTOR EXAMINATION: CPT

## 2019-07-29 ENCOUNTER — RX RENEWAL (OUTPATIENT)
Age: 74
End: 2019-07-29

## 2019-08-07 ENCOUNTER — OUTPATIENT (OUTPATIENT)
Dept: OUTPATIENT SERVICES | Facility: HOSPITAL | Age: 74
LOS: 1 days | End: 2019-08-07
Payer: COMMERCIAL

## 2019-08-07 ENCOUNTER — APPOINTMENT (OUTPATIENT)
Dept: ULTRASOUND IMAGING | Facility: CLINIC | Age: 74
End: 2019-08-07
Payer: COMMERCIAL

## 2019-08-07 ENCOUNTER — APPOINTMENT (OUTPATIENT)
Dept: CV DIAGNOSITCS | Facility: HOSPITAL | Age: 74
End: 2019-08-07

## 2019-08-07 DIAGNOSIS — Z00.8 ENCOUNTER FOR OTHER GENERAL EXAMINATION: ICD-10-CM

## 2019-08-07 DIAGNOSIS — R07.89 OTHER CHEST PAIN: ICD-10-CM

## 2019-08-07 DIAGNOSIS — K61.0 ANAL ABSCESS: Chronic | ICD-10-CM

## 2019-08-07 DIAGNOSIS — Z90.49 ACQUIRED ABSENCE OF OTHER SPECIFIED PARTS OF DIGESTIVE TRACT: Chronic | ICD-10-CM

## 2019-08-07 PROCEDURE — 76775 US EXAM ABDO BACK WALL LIM: CPT

## 2019-08-07 PROCEDURE — 76775 US EXAM ABDO BACK WALL LIM: CPT | Mod: 26

## 2019-08-07 PROCEDURE — 93306 TTE W/DOPPLER COMPLETE: CPT | Mod: 26

## 2019-09-03 ENCOUNTER — NON-APPOINTMENT (OUTPATIENT)
Age: 74
End: 2019-09-03

## 2019-09-03 ENCOUNTER — APPOINTMENT (OUTPATIENT)
Dept: INTERNAL MEDICINE | Facility: CLINIC | Age: 74
End: 2019-09-03
Payer: COMMERCIAL

## 2019-09-03 VITALS
WEIGHT: 185 LBS | RESPIRATION RATE: 16 BRPM | SYSTOLIC BLOOD PRESSURE: 130 MMHG | HEART RATE: 70 BPM | DIASTOLIC BLOOD PRESSURE: 70 MMHG | HEIGHT: 67 IN | BODY MASS INDEX: 29.03 KG/M2

## 2019-09-03 PROCEDURE — 93000 ELECTROCARDIOGRAM COMPLETE: CPT

## 2019-09-03 PROCEDURE — 36415 COLL VENOUS BLD VENIPUNCTURE: CPT

## 2019-09-03 PROCEDURE — 99497 ADVNCD CARE PLAN 30 MIN: CPT | Mod: 25

## 2019-09-03 PROCEDURE — 99397 PER PM REEVAL EST PAT 65+ YR: CPT | Mod: 25

## 2019-09-03 PROCEDURE — 99214 OFFICE O/P EST MOD 30 MIN: CPT | Mod: 25

## 2019-09-03 RX ORDER — SILDENAFIL CITRATE 50 MG/1
50 TABLET, FILM COATED ORAL
Qty: 6 | Refills: 5 | Status: DISCONTINUED | COMMUNITY
Start: 2017-01-09 | End: 2019-09-03

## 2019-09-03 RX ORDER — POTASSIUM CITRATE 10 MEQ/1
10 MEQ TABLET, EXTENDED RELEASE ORAL
Refills: 0 | Status: DISCONTINUED | COMMUNITY
Start: 2018-08-13 | End: 2019-09-03

## 2019-09-03 NOTE — REVIEW OF SYSTEMS
[Hearing Loss] : hearing loss [Negative] : Heme/Lymph [FreeTextEntry3] : last optho - 6 months  -OK [FreeTextEntry7] : see HPI [FreeTextEntry8] : nocturia x 2

## 2019-09-03 NOTE — HEALTH RISK ASSESSMENT
[Good] : ~his/her~  mood as  good [Yes] : Yes [2 - 4 times a month (2 pts)] : 2-4 times a month (2 points) [1 or 2 (0 pts)] : 1 or 2 (0 points) [No falls in past year] : Patient reported no falls in the past year [Never (0 pts)] : Never (0 points) [None] : None [Employed] : employed [With Family] : lives with family [Feels Safe at Home] : Feels safe at home [] :  [Fully functional (bathing, dressing, toileting, transferring, walking, feeding)] : Fully functional (bathing, dressing, toileting, transferring, walking, feeding) [Fully functional (using the telephone, shopping, preparing meals, housekeeping, doing laundry, using] : Fully functional and needs no help or supervision to perform IADLs (using the telephone, shopping, preparing meals, housekeeping, doing laundry, using transportation, managing medications and managing finances) [Smoke Detector] : smoke detector [Carbon Monoxide Detector] : carbon monoxide detector [Sunscreen] : uses sunscreen [Seat Belt] :  uses seat belt [With Patient/Caregiver] : With Patient/Caregiver [Designated Healthcare Proxy] : Designated healthcare proxy [Name: ___] : Health Care Proxy's Name: [unfilled]  [] : No [de-identified] : None [de-identified] : Low fat and salt [Change in mental status noted] : No change in mental status noted [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

## 2019-09-03 NOTE — ASSESSMENT
[FreeTextEntry1] : Pt with above medical issues which requires extra work in addition to the well visit.\par Labs sent\par Can try Cialis over Viagra as did not like Viagra  - thought it gave him a cough\par Meds to be adjusted.\par Health counseling given.\par FU 6 months.

## 2019-09-03 NOTE — PHYSICAL EXAM
[No Acute Distress] : no acute distress [Well Nourished] : well nourished [Well Developed] : well developed [Well-Appearing] : well-appearing [Normal Sclera/Conjunctiva] : normal sclera/conjunctiva [PERRL] : pupils equal round and reactive to light [EOMI] : extraocular movements intact [Normal Oropharynx] : the oropharynx was normal [Normal Outer Ear/Nose] : the outer ears and nose were normal in appearance [No JVD] : no jugular venous distention [Normal TMs] : both tympanic membranes were normal [No Lymphadenopathy] : no lymphadenopathy [Supple] : supple [Thyroid Normal, No Nodules] : the thyroid was normal and there were no nodules present [No Respiratory Distress] : no respiratory distress  [No Accessory Muscle Use] : no accessory muscle use [Normal Rate] : normal rate  [Clear to Auscultation] : lungs were clear to auscultation bilaterally [Regular Rhythm] : with a regular rhythm [Normal S1, S2] : normal S1 and S2 [No Murmur] : no murmur heard [No Abdominal Bruit] : a ~M bruit was not heard ~T in the abdomen [No Carotid Bruits] : no carotid bruits [No Varicosities] : no varicosities [Pedal Pulses Present] : the pedal pulses are present [No Palpable Aorta] : no palpable aorta [No Edema] : there was no peripheral edema [No Extremity Clubbing/Cyanosis] : no extremity clubbing/cyanosis [Soft] : abdomen soft [Non Tender] : non-tender [Non-distended] : non-distended [No Masses] : no abdominal mass palpated [No HSM] : no HSM [Normal Bowel Sounds] : normal bowel sounds [Normal Sphincter Tone] : normal sphincter tone [No Hernias] : no hernias [No Mass] : no mass [Testes Tenderness] : no tenderness of the testes [Penis Abnormality] : normal circumcised penis [Prostate Tenderness] : the prostate was not tender [Testes Mass (___cm)] : there were no testicular masses [No Prostate Nodules] : no prostate nodules [Prostate Size ___ (0-4)] : prostate size [unfilled] (scale: 0-4) [Normal Supraclavicular Nodes] : no supraclavicular lymphadenopathy [Normal Axillary Nodes] : no axillary lymphadenopathy [Normal Posterior Cervical Nodes] : no posterior cervical lymphadenopathy [Normal Anterior Cervical Nodes] : no anterior cervical lymphadenopathy [Normal Inguinal Nodes] : no inguinal lymphadenopathy [Normal Femoral Nodes] : no femoral lymphadenopathy [No CVA Tenderness] : no CVA  tenderness [No Spinal Tenderness] : no spinal tenderness [No Joint Swelling] : no joint swelling [Grossly Normal Strength/Tone] : grossly normal strength/tone [No Rash] : no rash [Coordination Grossly Intact] : coordination grossly intact [Normal Gait] : normal gait [No Focal Deficits] : no focal deficits [Deep Tendon Reflexes (DTR)] : deep tendon reflexes were 2+ and symmetric [Normal Affect] : the affect was normal [Normal Insight/Judgement] : insight and judgment were intact [Normal] : affect was normal and insight and judgment were intact

## 2019-09-03 NOTE — HISTORY OF PRESENT ILLNESS
[FreeTextEntry1] : Well visit and follow up for management of:\par Hypertension - on meds\par Hyperlipidemia - on meds\par ED  -on meds

## 2019-09-04 LAB
ALBUMIN SERPL ELPH-MCNC: 4.7 G/DL
ALP BLD-CCNC: 80 U/L
ALT SERPL-CCNC: 26 U/L
ANION GAP SERPL CALC-SCNC: 13 MMOL/L
APPEARANCE: CLEAR
AST SERPL-CCNC: 19 U/L
BASOPHILS # BLD AUTO: 0.06 K/UL
BASOPHILS NFR BLD AUTO: 1.1 %
BILIRUB SERPL-MCNC: 0.4 MG/DL
BILIRUBIN URINE: NEGATIVE
BLOOD URINE: NEGATIVE
BUN SERPL-MCNC: 25 MG/DL
CALCIUM SERPL-MCNC: 9.7 MG/DL
CHLORIDE SERPL-SCNC: 104 MMOL/L
CHOLEST SERPL-MCNC: 143 MG/DL
CHOLEST/HDLC SERPL: 3.9 RATIO
CO2 SERPL-SCNC: 25 MMOL/L
COLOR: NORMAL
CREAT SERPL-MCNC: 1.41 MG/DL
EOSINOPHIL # BLD AUTO: 0.08 K/UL
EOSINOPHIL NFR BLD AUTO: 1.5 %
ESTIMATED AVERAGE GLUCOSE: 108 MG/DL
GLUCOSE QUALITATIVE U: NEGATIVE
GLUCOSE SERPL-MCNC: 119 MG/DL
HBA1C MFR BLD HPLC: 5.4 %
HCT VFR BLD CALC: 44.9 %
HDLC SERPL-MCNC: 37 MG/DL
HGB BLD-MCNC: 14.2 G/DL
IMM GRANULOCYTES NFR BLD AUTO: 0.6 %
KETONES URINE: NEGATIVE
LDLC SERPL CALC-MCNC: 65 MG/DL
LEUKOCYTE ESTERASE URINE: NEGATIVE
LYMPHOCYTES # BLD AUTO: 1.35 K/UL
LYMPHOCYTES NFR BLD AUTO: 25.1 %
MAN DIFF?: NORMAL
MCHC RBC-ENTMCNC: 29.3 PG
MCHC RBC-ENTMCNC: 31.6 GM/DL
MCV RBC AUTO: 92.6 FL
MONOCYTES # BLD AUTO: 0.47 K/UL
MONOCYTES NFR BLD AUTO: 8.7 %
NEUTROPHILS # BLD AUTO: 3.39 K/UL
NEUTROPHILS NFR BLD AUTO: 63 %
NITRITE URINE: NEGATIVE
PH URINE: 6.5
PLATELET # BLD AUTO: 180 K/UL
POTASSIUM SERPL-SCNC: 4.8 MMOL/L
PROT SERPL-MCNC: 6.8 G/DL
PROTEIN URINE: NEGATIVE
PSA SERPL-MCNC: 4.29 NG/ML
RBC # BLD: 4.85 M/UL
RBC # FLD: 13.2 %
SODIUM SERPL-SCNC: 142 MMOL/L
SPECIFIC GRAVITY URINE: 1.02
T4 SERPL-MCNC: 5 UG/DL
TRIGL SERPL-MCNC: 204 MG/DL
TSH SERPL-ACNC: 3 UIU/ML
UROBILINOGEN URINE: NORMAL
WBC # FLD AUTO: 5.38 K/UL

## 2020-02-13 ENCOUNTER — RX RENEWAL (OUTPATIENT)
Age: 75
End: 2020-02-13

## 2020-03-18 ENCOUNTER — TRANSCRIPTION ENCOUNTER (OUTPATIENT)
Age: 75
End: 2020-03-18

## 2020-04-25 ENCOUNTER — MESSAGE (OUTPATIENT)
Age: 75
End: 2020-04-25

## 2020-04-29 ENCOUNTER — APPOINTMENT (OUTPATIENT)
Dept: DISASTER EMERGENCY | Facility: CLINIC | Age: 75
End: 2020-04-29

## 2020-04-30 LAB
SARS-COV-2 IGG SERPL IA-ACNC: 8 RATIO
SARS-COV-2 IGG SERPL QL IA: POSITIVE

## 2020-06-12 LAB
ALBUMIN SERPL ELPH-MCNC: 4.8 G/DL
ALP BLD-CCNC: 73 U/L
ALT SERPL-CCNC: 27 U/L
ANION GAP SERPL CALC-SCNC: 16 MMOL/L
APPEARANCE: CLEAR
AST SERPL-CCNC: 21 U/L
BASOPHILS # BLD AUTO: 0.04 K/UL
BASOPHILS NFR BLD AUTO: 0.6 %
BILIRUB SERPL-MCNC: 0.4 MG/DL
BILIRUBIN URINE: NEGATIVE
BLOOD URINE: NEGATIVE
BUN SERPL-MCNC: 31 MG/DL
CALCIUM SERPL-MCNC: 9.4 MG/DL
CHLORIDE SERPL-SCNC: 104 MMOL/L
CHOLEST SERPL-MCNC: 141 MG/DL
CHOLEST/HDLC SERPL: 4.1 RATIO
CO2 SERPL-SCNC: 23 MMOL/L
COLOR: NORMAL
CREAT SERPL-MCNC: 1.41 MG/DL
EOSINOPHIL # BLD AUTO: 0.13 K/UL
EOSINOPHIL NFR BLD AUTO: 2 %
GLUCOSE QUALITATIVE U: NEGATIVE
GLUCOSE SERPL-MCNC: 121 MG/DL
HCT VFR BLD CALC: 43.9 %
HDLC SERPL-MCNC: 34 MG/DL
HGB BLD-MCNC: 14.5 G/DL
IMM GRANULOCYTES NFR BLD AUTO: 0.9 %
KETONES URINE: NEGATIVE
LDLC SERPL CALC-MCNC: 57 MG/DL
LEUKOCYTE ESTERASE URINE: NEGATIVE
LYMPHOCYTES # BLD AUTO: 1.56 K/UL
LYMPHOCYTES NFR BLD AUTO: 24.3 %
MAN DIFF?: NORMAL
MCHC RBC-ENTMCNC: 28.6 PG
MCHC RBC-ENTMCNC: 33 GM/DL
MCV RBC AUTO: 86.6 FL
MONOCYTES # BLD AUTO: 0.61 K/UL
MONOCYTES NFR BLD AUTO: 9.5 %
NEUTROPHILS # BLD AUTO: 4.01 K/UL
NEUTROPHILS NFR BLD AUTO: 62.7 %
NITRITE URINE: NEGATIVE
PH URINE: 6
PLATELET # BLD AUTO: 193 K/UL
POTASSIUM SERPL-SCNC: 4.4 MMOL/L
PROT SERPL-MCNC: 6.9 G/DL
PROTEIN URINE: NORMAL
PSA SERPL-MCNC: 4.09 NG/ML
RBC # BLD: 5.07 M/UL
RBC # FLD: 13.6 %
SODIUM SERPL-SCNC: 143 MMOL/L
SPECIFIC GRAVITY URINE: 1.02
T4 FREE SERPL-MCNC: 1 NG/DL
TRIGL SERPL-MCNC: 248 MG/DL
TSH SERPL-ACNC: 2.14 UIU/ML
UROBILINOGEN URINE: NORMAL
WBC # FLD AUTO: 6.41 K/UL

## 2020-06-19 ENCOUNTER — MED ADMIN CHARGE (OUTPATIENT)
Age: 75
End: 2020-06-19

## 2020-06-19 ENCOUNTER — APPOINTMENT (OUTPATIENT)
Dept: INTERNAL MEDICINE | Facility: CLINIC | Age: 75
End: 2020-06-19
Payer: COMMERCIAL

## 2020-06-19 ENCOUNTER — NON-APPOINTMENT (OUTPATIENT)
Age: 75
End: 2020-06-19

## 2020-06-19 VITALS
DIASTOLIC BLOOD PRESSURE: 75 MMHG | WEIGHT: 188 LBS | HEART RATE: 70 BPM | RESPIRATION RATE: 16 BRPM | HEIGHT: 67 IN | SYSTOLIC BLOOD PRESSURE: 130 MMHG | BODY MASS INDEX: 29.51 KG/M2

## 2020-06-19 VITALS — TEMPERATURE: 97.2 F

## 2020-06-19 DIAGNOSIS — H90.5 UNSPECIFIED SENSORINEURAL HEARING LOSS: ICD-10-CM

## 2020-06-19 DIAGNOSIS — U07.1 COVID-19: ICD-10-CM

## 2020-06-19 DIAGNOSIS — J18.9 PNEUMONIA, UNSPECIFIED ORGANISM: ICD-10-CM

## 2020-06-19 DIAGNOSIS — R97.20 ELEVATED PROSTATE, SPECIFIC ANTIGEN [PSA]: ICD-10-CM

## 2020-06-19 DIAGNOSIS — N52.9 MALE ERECTILE DYSFUNCTION, UNSPECIFIED: ICD-10-CM

## 2020-06-19 DIAGNOSIS — Z86.19 PERSONAL HISTORY OF OTHER INFECTIOUS AND PARASITIC DISEASES: ICD-10-CM

## 2020-06-19 PROCEDURE — 99214 OFFICE O/P EST MOD 30 MIN: CPT | Mod: 25

## 2020-06-19 PROCEDURE — 93000 ELECTROCARDIOGRAM COMPLETE: CPT | Mod: 59

## 2020-06-19 PROCEDURE — G0444 DEPRESSION SCREEN ANNUAL: CPT | Mod: NC,59

## 2020-06-19 PROCEDURE — 36415 COLL VENOUS BLD VENIPUNCTURE: CPT

## 2020-06-19 PROCEDURE — 99397 PER PM REEVAL EST PAT 65+ YR: CPT | Mod: 25

## 2020-06-19 PROCEDURE — 99497 ADVNCD CARE PLAN 30 MIN: CPT | Mod: 25

## 2020-06-19 NOTE — HISTORY OF PRESENT ILLNESS
[FreeTextEntry1] : Well visit and follow up for management of:\par Hyperlipidemia - on meds\par Hypertension - on meds

## 2020-06-19 NOTE — ASSESSMENT
[FreeTextEntry1] : Pt with above medical issues which requires extra work in addition to the well visit.\par Labs reviewed.\par Meds adjusted.\par Sugar is elevated - will check A1C\par To be better with diet and exercise.\par Health counseling given\par FU 6 months.

## 2020-06-19 NOTE — PHYSICAL EXAM
[No Acute Distress] : no acute distress [Well Nourished] : well nourished [Normal Sclera/Conjunctiva] : normal sclera/conjunctiva [Well-Appearing] : well-appearing [Well Developed] : well developed [EOMI] : extraocular movements intact [Normal Outer Ear/Nose] : the outer ears and nose were normal in appearance [PERRL] : pupils equal round and reactive to light [Normal Oropharynx] : the oropharynx was normal [Normal TMs] : both tympanic membranes were normal [No JVD] : no jugular venous distention [No Lymphadenopathy] : no lymphadenopathy [Supple] : supple [Thyroid Normal, No Nodules] : the thyroid was normal and there were no nodules present [No Respiratory Distress] : no respiratory distress  [No Accessory Muscle Use] : no accessory muscle use [Clear to Auscultation] : lungs were clear to auscultation bilaterally [Normal Rate] : normal rate  [Regular Rhythm] : with a regular rhythm [Normal S1, S2] : normal S1 and S2 [No Murmur] : no murmur heard [No Carotid Bruits] : no carotid bruits [No Abdominal Bruit] : a ~M bruit was not heard ~T in the abdomen [No Varicosities] : no varicosities [Pedal Pulses Present] : the pedal pulses are present [No Edema] : there was no peripheral edema [No Palpable Aorta] : no palpable aorta [No Extremity Clubbing/Cyanosis] : no extremity clubbing/cyanosis [Soft] : abdomen soft [Non Tender] : non-tender [Non-distended] : non-distended [No Masses] : no abdominal mass palpated [No HSM] : no HSM [Normal Bowel Sounds] : normal bowel sounds [No Hernias] : no hernias [Normal Sphincter Tone] : normal sphincter tone [No Mass] : no mass [Testes Tenderness] : no tenderness of the testes [Testes Mass (___cm)] : there were no testicular masses [Prostate Tenderness] : the prostate was not tender [Penis Abnormality] : normal circumcised penis [No Prostate Nodules] : no prostate nodules [Prostate Size ___ (0-4)] : prostate size [unfilled] (scale: 0-4) [Normal Axillary Nodes] : no axillary lymphadenopathy [Normal Supraclavicular Nodes] : no supraclavicular lymphadenopathy [Normal Posterior Cervical Nodes] : no posterior cervical lymphadenopathy [Normal Inguinal Nodes] : no inguinal lymphadenopathy [Normal Anterior Cervical Nodes] : no anterior cervical lymphadenopathy [Normal Femoral Nodes] : no femoral lymphadenopathy [No CVA Tenderness] : no CVA  tenderness [Grossly Normal Strength/Tone] : grossly normal strength/tone [No Spinal Tenderness] : no spinal tenderness [No Joint Swelling] : no joint swelling [No Rash] : no rash [Coordination Grossly Intact] : coordination grossly intact [No Focal Deficits] : no focal deficits [Normal Gait] : normal gait [Deep Tendon Reflexes (DTR)] : deep tendon reflexes were 2+ and symmetric [Normal Insight/Judgement] : insight and judgment were intact [Normal Affect] : the affect was normal [Normal] : affect was normal and insight and judgment were intact

## 2020-06-19 NOTE — REVIEW OF SYSTEMS
[Hearing Loss] : hearing loss [Negative] : Heme/Lymph [FreeTextEntry3] : last optho 1 week  -OK [FreeTextEntry8] : nocturia x 2 [de-identified] : sees derm

## 2020-06-19 NOTE — HEALTH RISK ASSESSMENT
[Good] : ~his/her~  mood as  good [1 or 2 (0 pts)] : 1 or 2 (0 points) [2 - 3 times a week (3 pts)] : 2 - 3  times a week (3 points) [Yes] : Yes [No falls in past year] : Patient reported no falls in the past year [Never (0 pts)] : Never (0 points) [None] : None [Employed] : employed [Graduate School] : graduate school [With Family] : lives with family [Fully functional (bathing, dressing, toileting, transferring, walking, feeding)] : Fully functional (bathing, dressing, toileting, transferring, walking, feeding) [] :  [Smoke Detector] : smoke detector [Fully functional (using the telephone, shopping, preparing meals, housekeeping, doing laundry, using] : Fully functional and needs no help or supervision to perform IADLs (using the telephone, shopping, preparing meals, housekeeping, doing laundry, using transportation, managing medications and managing finances) [Carbon Monoxide Detector] : carbon monoxide detector [Seat Belt] :  uses seat belt [Designated Healthcare Proxy] : Designated healthcare proxy [Name: ___] : Health Care Proxy's Name: [unfilled]  [With Patient/Caregiver] : With Patient/Caregiver [de-identified] : Not much [] : No [de-identified] : Low fat and salt [Change in mental status noted] : No change in mental status noted [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

## 2020-06-22 LAB
ESTIMATED AVERAGE GLUCOSE: 111 MG/DL
HBA1C MFR BLD HPLC: 5.5 %

## 2020-06-24 ENCOUNTER — APPOINTMENT (OUTPATIENT)
Dept: OTOLARYNGOLOGY | Facility: CLINIC | Age: 75
End: 2020-06-24
Payer: COMMERCIAL

## 2020-06-24 DIAGNOSIS — H91.93 UNSPECIFIED HEARING LOSS, BILATERAL: ICD-10-CM

## 2020-06-24 PROCEDURE — 92567 TYMPANOMETRY: CPT

## 2020-06-24 PROCEDURE — 99213 OFFICE O/P EST LOW 20 MIN: CPT | Mod: 25

## 2020-06-24 PROCEDURE — 92557 COMPREHENSIVE HEARING TEST: CPT

## 2020-06-24 NOTE — HISTORY OF PRESENT ILLNESS
[de-identified] : 74 y/o with hx of hi freq SNHL , feels that the hearing has gotten worse especially over last few months. denies tinnitus but feels having problem hearing in crowd home, etc. Recuperating from COVID  no change in hearing from covid just gradual decrease\par

## 2020-07-06 ENCOUNTER — APPOINTMENT (OUTPATIENT)
Dept: PHARMACY | Facility: CLINIC | Age: 75
End: 2020-07-06
Payer: SELF-PAY

## 2020-07-06 PROCEDURE — V5010 ASSESSMENT FOR HEARING AID: CPT

## 2020-07-17 ENCOUNTER — RX RENEWAL (OUTPATIENT)
Age: 75
End: 2020-07-17

## 2020-07-22 ENCOUNTER — APPOINTMENT (OUTPATIENT)
Dept: PHARMACY | Facility: CLINIC | Age: 75
End: 2020-07-22
Payer: SELF-PAY

## 2020-07-22 PROCEDURE — V5261C: CUSTOM

## 2020-08-05 ENCOUNTER — APPOINTMENT (OUTPATIENT)
Dept: PHARMACY | Facility: CLINIC | Age: 75
End: 2020-08-05
Payer: SELF-PAY

## 2020-08-05 PROCEDURE — V5299A: CUSTOM | Mod: NC

## 2020-08-12 ENCOUNTER — APPOINTMENT (OUTPATIENT)
Dept: PHARMACY | Facility: CLINIC | Age: 75
End: 2020-08-12
Payer: SELF-PAY

## 2020-08-12 PROCEDURE — V5299A: CUSTOM | Mod: NC

## 2020-08-24 ENCOUNTER — RESULT REVIEW (OUTPATIENT)
Age: 75
End: 2020-08-24

## 2020-11-18 ENCOUNTER — APPOINTMENT (OUTPATIENT)
Dept: PHARMACY | Facility: CLINIC | Age: 75
End: 2020-11-18
Payer: SELF-PAY

## 2020-11-18 PROCEDURE — V5299A: CUSTOM | Mod: NC

## 2020-11-23 ENCOUNTER — APPOINTMENT (OUTPATIENT)
Dept: INTERNAL MEDICINE | Facility: CLINIC | Age: 75
End: 2020-11-23
Payer: COMMERCIAL

## 2020-11-23 VITALS
SYSTOLIC BLOOD PRESSURE: 130 MMHG | WEIGHT: 192 LBS | HEART RATE: 72 BPM | BODY MASS INDEX: 30.07 KG/M2 | DIASTOLIC BLOOD PRESSURE: 80 MMHG | RESPIRATION RATE: 16 BRPM

## 2020-11-23 VITALS — WEIGHT: 188 LBS | TEMPERATURE: 98.2 F | HEIGHT: 67 IN | BODY MASS INDEX: 29.51 KG/M2

## 2020-11-23 PROCEDURE — 36415 COLL VENOUS BLD VENIPUNCTURE: CPT

## 2020-11-23 PROCEDURE — 99214 OFFICE O/P EST MOD 30 MIN: CPT | Mod: 25

## 2020-11-23 NOTE — PHYSICAL EXAM
[Normal Sclera/Conjunctiva] : normal sclera/conjunctiva [No JVD] : no jugular venous distention [No Lymphadenopathy] : no lymphadenopathy [Normal] : normal rate, regular rhythm, normal S1 and S2 and no murmur heard [No Edema] : there was no peripheral edema [Soft] : abdomen soft [Non Tender] : non-tender [Non-distended] : non-distended [No HSM] : no HSM [Normal Supraclavicular Nodes] : no supraclavicular lymphadenopathy [Normal Posterior Cervical Nodes] : no posterior cervical lymphadenopathy [Normal Anterior Cervical Nodes] : no anterior cervical lymphadenopathy [No Focal Deficits] : no focal deficits [Alert and Oriented x3] : oriented to person, place, and time

## 2020-11-23 NOTE — HISTORY OF PRESENT ILLNESS
[FreeTextEntry1] : FU for hypertension, hyperlipidemia, low Vit D\par Watching diet\par Not exercising\par Slipped yesterday  - OK\par

## 2020-11-23 NOTE — ASSESSMENT
[FreeTextEntry1] : Pt with above medical issues.\par Bloods drawn in office.\par Meds to be adjusted.\par \par

## 2020-11-24 ENCOUNTER — NON-APPOINTMENT (OUTPATIENT)
Age: 75
End: 2020-11-24

## 2020-11-24 LAB
ALBUMIN SERPL ELPH-MCNC: 4.8 G/DL
ALP BLD-CCNC: 87 U/L
ALT SERPL-CCNC: 24 U/L
ANION GAP SERPL CALC-SCNC: 12 MMOL/L
AST SERPL-CCNC: 19 U/L
BASOPHILS # BLD AUTO: 0.04 K/UL
BASOPHILS NFR BLD AUTO: 0.6 %
BILIRUB SERPL-MCNC: 0.3 MG/DL
BUN SERPL-MCNC: 26 MG/DL
CALCIUM SERPL-MCNC: 9.8 MG/DL
CHLORIDE SERPL-SCNC: 104 MMOL/L
CHOLEST SERPL-MCNC: 164 MG/DL
CO2 SERPL-SCNC: 27 MMOL/L
CREAT SERPL-MCNC: 1.56 MG/DL
EOSINOPHIL # BLD AUTO: 0.08 K/UL
EOSINOPHIL NFR BLD AUTO: 1.2 %
ESTIMATED AVERAGE GLUCOSE: 114 MG/DL
GLUCOSE SERPL-MCNC: 92 MG/DL
HBA1C MFR BLD HPLC: 5.6 %
HCT VFR BLD CALC: 45.9 %
HDLC SERPL-MCNC: 33 MG/DL
HGB BLD-MCNC: 14.7 G/DL
IMM GRANULOCYTES NFR BLD AUTO: 0.6 %
LDLC SERPL CALC-MCNC: NORMAL MG/DL
LYMPHOCYTES # BLD AUTO: 1.4 K/UL
LYMPHOCYTES NFR BLD AUTO: 21.8 %
MAN DIFF?: NORMAL
MCHC RBC-ENTMCNC: 29.2 PG
MCHC RBC-ENTMCNC: 32 GM/DL
MCV RBC AUTO: 91.1 FL
MONOCYTES # BLD AUTO: 0.59 K/UL
MONOCYTES NFR BLD AUTO: 9.2 %
NEUTROPHILS # BLD AUTO: 4.26 K/UL
NEUTROPHILS NFR BLD AUTO: 66.6 %
NONHDLC SERPL-MCNC: 131 MG/DL
PLATELET # BLD AUTO: 182 K/UL
POTASSIUM SERPL-SCNC: 4.5 MMOL/L
PROT SERPL-MCNC: 7 G/DL
RBC # BLD: 5.04 M/UL
RBC # FLD: 13.2 %
SODIUM SERPL-SCNC: 143 MMOL/L
TRIGL SERPL-MCNC: 541 MG/DL
WBC # FLD AUTO: 6.41 K/UL

## 2020-11-25 LAB — URATE SERPL-MCNC: 6.5 MG/DL

## 2020-12-23 ENCOUNTER — APPOINTMENT (OUTPATIENT)
Dept: PHARMACY | Facility: CLINIC | Age: 75
End: 2020-12-23
Payer: SELF-PAY

## 2020-12-23 PROCEDURE — V5299A: CUSTOM | Mod: NC

## 2021-02-22 ENCOUNTER — APPOINTMENT (OUTPATIENT)
Dept: PHARMACY | Facility: CLINIC | Age: 76
End: 2021-02-22
Payer: SELF-PAY

## 2021-02-22 PROCEDURE — V5299A: CUSTOM | Mod: NC

## 2021-03-28 ENCOUNTER — RX RENEWAL (OUTPATIENT)
Age: 76
End: 2021-03-28

## 2021-06-25 ENCOUNTER — NON-APPOINTMENT (OUTPATIENT)
Age: 76
End: 2021-06-25

## 2021-06-25 ENCOUNTER — APPOINTMENT (OUTPATIENT)
Dept: PHARMACY | Facility: CLINIC | Age: 76
End: 2021-06-25
Payer: SELF-PAY

## 2021-06-25 PROCEDURE — V5299A: CUSTOM | Mod: NC

## 2021-07-06 ENCOUNTER — RX RENEWAL (OUTPATIENT)
Age: 76
End: 2021-07-06

## 2021-07-15 ENCOUNTER — RX RENEWAL (OUTPATIENT)
Age: 76
End: 2021-07-15

## 2021-07-15 RX ORDER — TADALAFIL 10 MG/1
10 TABLET, FILM COATED ORAL
Qty: 18 | Refills: 3 | Status: ACTIVE | COMMUNITY
Start: 2019-09-03 | End: 1900-01-01

## 2021-07-21 ENCOUNTER — APPOINTMENT (OUTPATIENT)
Dept: CV DIAGNOSITCS | Facility: HOSPITAL | Age: 76
End: 2021-07-21

## 2021-07-21 ENCOUNTER — OUTPATIENT (OUTPATIENT)
Dept: OUTPATIENT SERVICES | Facility: HOSPITAL | Age: 76
LOS: 1 days | End: 2021-07-21

## 2021-07-21 ENCOUNTER — OUTPATIENT (OUTPATIENT)
Dept: OUTPATIENT SERVICES | Facility: HOSPITAL | Age: 76
LOS: 1 days | End: 2021-07-21
Payer: COMMERCIAL

## 2021-07-21 ENCOUNTER — APPOINTMENT (OUTPATIENT)
Dept: CARDIOLOGY | Facility: CLINIC | Age: 76
End: 2021-07-21
Payer: COMMERCIAL

## 2021-07-21 ENCOUNTER — APPOINTMENT (OUTPATIENT)
Dept: ULTRASOUND IMAGING | Facility: HOSPITAL | Age: 76
End: 2021-07-21
Payer: COMMERCIAL

## 2021-07-21 ENCOUNTER — NON-APPOINTMENT (OUTPATIENT)
Age: 76
End: 2021-07-21

## 2021-07-21 DIAGNOSIS — N20.0 CALCULUS OF KIDNEY: ICD-10-CM

## 2021-07-21 DIAGNOSIS — K61.0 ANAL ABSCESS: Chronic | ICD-10-CM

## 2021-07-21 DIAGNOSIS — Z90.49 ACQUIRED ABSENCE OF OTHER SPECIFIED PARTS OF DIGESTIVE TRACT: Chronic | ICD-10-CM

## 2021-07-21 DIAGNOSIS — I25.10 ATHEROSCLEROTIC HEART DISEASE OF NATIVE CORONARY ARTERY WITHOUT ANGINA PECTORIS: ICD-10-CM

## 2021-07-21 PROCEDURE — 93306 TTE W/DOPPLER COMPLETE: CPT | Mod: 26

## 2021-07-21 PROCEDURE — 93306 TTE W/DOPPLER COMPLETE: CPT

## 2021-07-21 PROCEDURE — 93000 ELECTROCARDIOGRAM COMPLETE: CPT

## 2021-07-21 PROCEDURE — 76770 US EXAM ABDO BACK WALL COMP: CPT | Mod: 26

## 2021-08-20 LAB
ALBUMIN SERPL ELPH-MCNC: 4.6 G/DL
ALP BLD-CCNC: 100 U/L
ALT SERPL-CCNC: 23 U/L
ANION GAP SERPL CALC-SCNC: 12 MMOL/L
AST SERPL-CCNC: 19 U/L
BILIRUB SERPL-MCNC: 0.4 MG/DL
BUN SERPL-MCNC: 21 MG/DL
CALCIUM SERPL-MCNC: 9 MG/DL
CHLORIDE SERPL-SCNC: 104 MMOL/L
CO2 SERPL-SCNC: 24 MMOL/L
CREAT SERPL-MCNC: 1.44 MG/DL
GLUCOSE SERPL-MCNC: 100 MG/DL
POTASSIUM SERPL-SCNC: 4.4 MMOL/L
PROT SERPL-MCNC: 6.5 G/DL
SODIUM SERPL-SCNC: 141 MMOL/L

## 2021-10-11 ENCOUNTER — APPOINTMENT (OUTPATIENT)
Dept: PHARMACY | Facility: CLINIC | Age: 76
End: 2021-10-11
Payer: SELF-PAY

## 2021-10-11 PROCEDURE — V5267D: CUSTOM

## 2021-10-11 PROCEDURE — V5265A: CUSTOM

## 2021-11-11 ENCOUNTER — RX RENEWAL (OUTPATIENT)
Age: 76
End: 2021-11-11

## 2021-12-22 DIAGNOSIS — Z11.52 ENCOUNTER FOR SCREENING FOR COVID-19: ICD-10-CM

## 2021-12-26 LAB — SARS-COV-2 N GENE NPH QL NAA+PROBE: NOT DETECTED

## 2022-01-13 LAB — SARS-COV-2 N GENE NPH QL NAA+PROBE: NOT DETECTED

## 2022-01-21 ENCOUNTER — APPOINTMENT (OUTPATIENT)
Dept: INTERNAL MEDICINE | Facility: CLINIC | Age: 77
End: 2022-01-21
Payer: COMMERCIAL

## 2022-01-21 ENCOUNTER — NON-APPOINTMENT (OUTPATIENT)
Age: 77
End: 2022-01-21

## 2022-01-21 VITALS
WEIGHT: 188 LBS | HEART RATE: 76 BPM | DIASTOLIC BLOOD PRESSURE: 80 MMHG | SYSTOLIC BLOOD PRESSURE: 130 MMHG | HEIGHT: 67 IN | RESPIRATION RATE: 16 BRPM | BODY MASS INDEX: 29.51 KG/M2

## 2022-01-21 DIAGNOSIS — Z00.00 ENCOUNTER FOR GENERAL ADULT MEDICAL EXAMINATION W/OUT ABNORMAL FINDINGS: ICD-10-CM

## 2022-01-21 DIAGNOSIS — Z86.69 PERSONAL HISTORY OF OTHER DISEASES OF THE NERVOUS SYSTEM AND SENSE ORGANS: ICD-10-CM

## 2022-01-21 DIAGNOSIS — E55.9 VITAMIN D DEFICIENCY, UNSPECIFIED: ICD-10-CM

## 2022-01-21 PROCEDURE — 99497 ADVNCD CARE PLAN 30 MIN: CPT

## 2022-01-21 PROCEDURE — 99397 PER PM REEVAL EST PAT 65+ YR: CPT | Mod: 25

## 2022-01-21 PROCEDURE — 93000 ELECTROCARDIOGRAM COMPLETE: CPT | Mod: 59

## 2022-01-21 PROCEDURE — 36415 COLL VENOUS BLD VENIPUNCTURE: CPT

## 2022-01-21 PROCEDURE — 99213 OFFICE O/P EST LOW 20 MIN: CPT | Mod: 25

## 2022-01-21 PROCEDURE — G0444 DEPRESSION SCREEN ANNUAL: CPT | Mod: 59

## 2022-01-21 RX ORDER — FLUTICASONE PROPIONATE AND SALMETEROL 50; 500 UG/1; UG/1
500-50 POWDER RESPIRATORY (INHALATION) TWICE DAILY
Qty: 1 | Refills: 1 | Status: DISCONTINUED | COMMUNITY
Start: 2017-02-16 | End: 2022-01-21

## 2022-01-21 NOTE — REVIEW OF SYSTEMS
[Hearing Loss] : hearing loss [Nocturia] : nocturia [Negative] : Heme/Lymph [FreeTextEntry3] : last optho - 1 year [FreeTextEntry8] : nocturia x 2

## 2022-01-21 NOTE — ASSESSMENT
[FreeTextEntry1] : Pt with above medical issues which requires extra work in addition to the well visit.\par Bloods drawn in office.\par Meds to be adjusted.\par Health counseling given.\par FU 6 months

## 2022-01-21 NOTE — HEALTH RISK ASSESSMENT
[Good] : ~his/her~  mood as  good [Never] : Never [Yes] : Yes [2 - 4 times a month (2 pts)] : 2-4 times a month (2 points) [1 or 2 (0 pts)] : 1 or 2 (0 points) [Never (0 pts)] : Never (0 points) [No falls in past year] : Patient reported no falls in the past year [PHQ-2 Negative - No further assessment needed] : PHQ-2 Negative - No further assessment needed [None] : None [With Family] : lives with family [Employed] : employed [] :  [Feels Safe at Home] : Feels safe at home [Fully functional (bathing, dressing, toileting, transferring, walking, feeding)] : Fully functional (bathing, dressing, toileting, transferring, walking, feeding) [Fully functional (using the telephone, shopping, preparing meals, housekeeping, doing laundry, using] : Fully functional and needs no help or supervision to perform IADLs (using the telephone, shopping, preparing meals, housekeeping, doing laundry, using transportation, managing medications and managing finances) [Smoke Detector] : smoke detector [Carbon Monoxide Detector] : carbon monoxide detector [Seat Belt] :  uses seat belt [With Patient/Caregiver] : , with patient/caregiver [Designated Healthcare Proxy] : Designated healthcare proxy [Name: ___] : Health Care Proxy's Name: [unfilled]  [Time Spent: ___ minutes] : Time Spent: [unfilled] minutes [de-identified] : Walking [de-identified] : Low fat and salt [Change in mental status noted] : No change in mental status noted [Sunscreen] : does not use sunscreen [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

## 2022-01-21 NOTE — PHYSICAL EXAM
[No Acute Distress] : no acute distress [Well Nourished] : well nourished [Well Developed] : well developed [Well-Appearing] : well-appearing [Normal Sclera/Conjunctiva] : normal sclera/conjunctiva [PERRL] : pupils equal round and reactive to light [EOMI] : extraocular movements intact [Normal Outer Ear/Nose] : the outer ears and nose were normal in appearance [Normal Oropharynx] : the oropharynx was normal [Normal TMs] : both tympanic membranes were normal [No JVD] : no jugular venous distention [No Lymphadenopathy] : no lymphadenopathy [Supple] : supple [Thyroid Normal, No Nodules] : the thyroid was normal and there were no nodules present [No Respiratory Distress] : no respiratory distress  [No Accessory Muscle Use] : no accessory muscle use [Clear to Auscultation] : lungs were clear to auscultation bilaterally [Normal Rate] : normal rate  [Regular Rhythm] : with a regular rhythm [Normal S1, S2] : normal S1 and S2 [No Murmur] : no murmur heard [No Carotid Bruits] : no carotid bruits [No Abdominal Bruit] : a ~M bruit was not heard ~T in the abdomen [No Varicosities] : no varicosities [Pedal Pulses Present] : the pedal pulses are present [No Edema] : there was no peripheral edema [No Palpable Aorta] : no palpable aorta [No Extremity Clubbing/Cyanosis] : no extremity clubbing/cyanosis [Soft] : abdomen soft [Non Tender] : non-tender [Non-distended] : non-distended [No Masses] : no abdominal mass palpated [No HSM] : no HSM [Normal Bowel Sounds] : normal bowel sounds [No Hernias] : no hernias [Normal Sphincter Tone] : normal sphincter tone [No Mass] : no mass [Penis Abnormality] : normal circumcised penis [Testes Tenderness] : no tenderness of the testes [Testes Mass (___cm)] : there were no testicular masses [Prostate Tenderness] : the prostate was not tender [No Prostate Nodules] : no prostate nodules [Prostate Size ___ (0-4)] : prostate size [unfilled] (scale: 0-4) [Normal Supraclavicular Nodes] : no supraclavicular lymphadenopathy [Normal Axillary Nodes] : no axillary lymphadenopathy [Normal Posterior Cervical Nodes] : no posterior cervical lymphadenopathy [Normal Anterior Cervical Nodes] : no anterior cervical lymphadenopathy [Normal Inguinal Nodes] : no inguinal lymphadenopathy [Normal Femoral Nodes] : no femoral lymphadenopathy [No CVA Tenderness] : no CVA  tenderness [No Spinal Tenderness] : no spinal tenderness [No Joint Swelling] : no joint swelling [Grossly Normal Strength/Tone] : grossly normal strength/tone [No Rash] : no rash [Coordination Grossly Intact] : coordination grossly intact [No Focal Deficits] : no focal deficits [Normal Gait] : normal gait [Deep Tendon Reflexes (DTR)] : deep tendon reflexes were 2+ and symmetric [Normal Affect] : the affect was normal [Alert and Oriented x3] : oriented to person, place, and time [Normal Insight/Judgement] : insight and judgment were intact

## 2022-01-24 ENCOUNTER — RX RENEWAL (OUTPATIENT)
Age: 77
End: 2022-01-24

## 2022-01-24 ENCOUNTER — NON-APPOINTMENT (OUTPATIENT)
Age: 77
End: 2022-01-24

## 2022-01-24 LAB
25(OH)D3 SERPL-MCNC: 65.2 NG/ML
ALBUMIN SERPL ELPH-MCNC: 4.9 G/DL
ALP BLD-CCNC: 87 U/L
ALT SERPL-CCNC: 29 U/L
ANION GAP SERPL CALC-SCNC: 17 MMOL/L
APO LP(A) SERPL-MCNC: 18.4 NMOL/L
APPEARANCE: CLEAR
AST SERPL-CCNC: 19 U/L
BASOPHILS # BLD AUTO: 0.05 K/UL
BASOPHILS NFR BLD AUTO: 0.9 %
BILIRUB SERPL-MCNC: 0.4 MG/DL
BILIRUBIN URINE: NEGATIVE
BLOOD URINE: NEGATIVE
BUN SERPL-MCNC: 23 MG/DL
CALCIUM SERPL-MCNC: 9.5 MG/DL
CHLORIDE SERPL-SCNC: 104 MMOL/L
CHOLEST SERPL-MCNC: 153 MG/DL
CO2 SERPL-SCNC: 22 MMOL/L
COLOR: YELLOW
CREAT SERPL-MCNC: 1.54 MG/DL
EOSINOPHIL # BLD AUTO: 0.09 K/UL
EOSINOPHIL NFR BLD AUTO: 1.6 %
ESTIMATED AVERAGE GLUCOSE: 117 MG/DL
GLUCOSE QUALITATIVE U: NEGATIVE
GLUCOSE SERPL-MCNC: 117 MG/DL
HBA1C MFR BLD HPLC: 5.7 %
HCT VFR BLD CALC: 43.5 %
HDLC SERPL-MCNC: 34 MG/DL
HGB BLD-MCNC: 14.4 G/DL
IMM GRANULOCYTES NFR BLD AUTO: 0.7 %
KETONES URINE: NEGATIVE
LDLC SERPL CALC-MCNC: 70 MG/DL
LEUKOCYTE ESTERASE URINE: NEGATIVE
LYMPHOCYTES # BLD AUTO: 1.23 K/UL
LYMPHOCYTES NFR BLD AUTO: 21.8 %
MAN DIFF?: NORMAL
MCHC RBC-ENTMCNC: 28.8 PG
MCHC RBC-ENTMCNC: 33.1 GM/DL
MCV RBC AUTO: 87 FL
MONOCYTES # BLD AUTO: 0.52 K/UL
MONOCYTES NFR BLD AUTO: 9.2 %
NEUTROPHILS # BLD AUTO: 3.72 K/UL
NEUTROPHILS NFR BLD AUTO: 65.8 %
NITRITE URINE: NEGATIVE
NONHDLC SERPL-MCNC: 119 MG/DL
PH URINE: 5.5
PLATELET # BLD AUTO: 183 K/UL
POTASSIUM SERPL-SCNC: 4.8 MMOL/L
PROT SERPL-MCNC: 7 G/DL
PROTEIN URINE: NEGATIVE
PSA SERPL-MCNC: 4.12 NG/ML
RBC # BLD: 5 M/UL
RBC # FLD: 13.1 %
SODIUM SERPL-SCNC: 143 MMOL/L
SPECIFIC GRAVITY URINE: 1.02
TRIGL SERPL-MCNC: 246 MG/DL
TSH SERPL-ACNC: 2.31 UIU/ML
URATE SERPL-MCNC: 6.9 MG/DL
UROBILINOGEN URINE: NORMAL
WBC # FLD AUTO: 5.65 K/UL

## 2022-03-17 ENCOUNTER — RX RENEWAL (OUTPATIENT)
Age: 77
End: 2022-03-17

## 2022-03-25 DIAGNOSIS — Z01.818 ENCOUNTER FOR OTHER PREPROCEDURAL EXAMINATION: ICD-10-CM

## 2022-04-01 ENCOUNTER — RX RENEWAL (OUTPATIENT)
Age: 77
End: 2022-04-01

## 2022-04-27 ENCOUNTER — APPOINTMENT (OUTPATIENT)
Dept: PHARMACY | Facility: CLINIC | Age: 77
End: 2022-04-27
Payer: SELF-PAY

## 2022-04-27 PROCEDURE — V5299A: CUSTOM | Mod: NC

## 2022-05-05 LAB — SARS-COV-2 N GENE NPH QL NAA+PROBE: NOT DETECTED

## 2022-05-06 ENCOUNTER — APPOINTMENT (OUTPATIENT)
Dept: PHARMACY | Facility: CLINIC | Age: 77
End: 2022-05-06
Payer: SELF-PAY

## 2022-05-06 PROCEDURE — V5299A: CUSTOM | Mod: NC

## 2022-06-29 ENCOUNTER — RX RENEWAL (OUTPATIENT)
Age: 77
End: 2022-06-29

## 2022-07-01 ENCOUNTER — TRANSCRIPTION ENCOUNTER (OUTPATIENT)
Age: 77
End: 2022-07-01

## 2022-07-08 ENCOUNTER — APPOINTMENT (OUTPATIENT)
Dept: INTERNAL MEDICINE | Facility: CLINIC | Age: 77
End: 2022-07-08

## 2022-07-08 VITALS — HEIGHT: 67 IN | WEIGHT: 187 LBS | BODY MASS INDEX: 29.35 KG/M2

## 2022-07-08 VITALS — SYSTOLIC BLOOD PRESSURE: 130 MMHG | HEART RATE: 76 BPM | DIASTOLIC BLOOD PRESSURE: 80 MMHG | RESPIRATION RATE: 16 BRPM

## 2022-07-08 DIAGNOSIS — M79.10 MYALGIA, UNSPECIFIED SITE: ICD-10-CM

## 2022-07-08 PROCEDURE — 36415 COLL VENOUS BLD VENIPUNCTURE: CPT

## 2022-07-08 PROCEDURE — 99214 OFFICE O/P EST MOD 30 MIN: CPT | Mod: 25

## 2022-07-08 RX ORDER — POTASSIUM CITRATE 10 MEQ/1
10 MEQ TABLET, EXTENDED RELEASE ORAL
Qty: 450 | Refills: 0 | Status: ACTIVE | COMMUNITY
Start: 2016-06-23

## 2022-07-08 NOTE — ASSESSMENT
[FreeTextEntry1] : Pt with above medical issues.\par Bloods drawn in office.\par Meds to be adjusted.\par

## 2022-07-08 NOTE — HISTORY OF PRESENT ILLNESS
[FreeTextEntry1] : Feels some muscle stiffness - worse in AM - gets better during the day\par Walking\par Weight stable

## 2022-07-11 ENCOUNTER — NON-APPOINTMENT (OUTPATIENT)
Age: 77
End: 2022-07-11

## 2022-07-11 LAB
ALBUMIN SERPL ELPH-MCNC: 5.1 G/DL
ALP BLD-CCNC: 85 U/L
ALT SERPL-CCNC: 25 U/L
ANION GAP SERPL CALC-SCNC: 11 MMOL/L
AST SERPL-CCNC: 17 U/L
BILIRUB SERPL-MCNC: 0.6 MG/DL
BUN SERPL-MCNC: 24 MG/DL
CALCIUM SERPL-MCNC: 9.9 MG/DL
CHLORIDE SERPL-SCNC: 103 MMOL/L
CHOLEST SERPL-MCNC: 154 MG/DL
CK SERPL-CCNC: 109 U/L
CO2 SERPL-SCNC: 27 MMOL/L
CREAT SERPL-MCNC: 1.54 MG/DL
EGFR: 46 ML/MIN/1.73M2
ERYTHROCYTE [SEDIMENTATION RATE] IN BLOOD BY WESTERGREN METHOD: 4 MM/HR
ESTIMATED AVERAGE GLUCOSE: 120 MG/DL
GLUCOSE SERPL-MCNC: 104 MG/DL
HBA1C MFR BLD HPLC: 5.8 %
HDLC SERPL-MCNC: 33 MG/DL
LDLC SERPL CALC-MCNC: 58 MG/DL
NONHDLC SERPL-MCNC: 121 MG/DL
POTASSIUM SERPL-SCNC: 4.6 MMOL/L
PROT SERPL-MCNC: 7.2 G/DL
SODIUM SERPL-SCNC: 141 MMOL/L
TRIGL SERPL-MCNC: 314 MG/DL

## 2022-08-10 ENCOUNTER — NON-APPOINTMENT (OUTPATIENT)
Age: 77
End: 2022-08-10

## 2022-08-23 ENCOUNTER — RX RENEWAL (OUTPATIENT)
Age: 77
End: 2022-08-23

## 2022-08-24 LAB — SARS-COV-2 N GENE NPH QL NAA+PROBE: NOT DETECTED

## 2022-08-29 ENCOUNTER — APPOINTMENT (OUTPATIENT)
Dept: PHARMACY | Facility: CLINIC | Age: 77
End: 2022-08-29

## 2022-08-29 PROCEDURE — V5014D: CUSTOM | Mod: RT

## 2022-08-29 PROCEDURE — V5299I: CUSTOM | Mod: RT

## 2022-08-30 ENCOUNTER — APPOINTMENT (OUTPATIENT)
Dept: GASTROENTEROLOGY | Facility: CLINIC | Age: 77
End: 2022-08-30

## 2022-08-30 VITALS
HEART RATE: 68 BPM | WEIGHT: 191 LBS | HEIGHT: 67 IN | BODY MASS INDEX: 29.98 KG/M2 | DIASTOLIC BLOOD PRESSURE: 75 MMHG | SYSTOLIC BLOOD PRESSURE: 153 MMHG

## 2022-08-30 DIAGNOSIS — E66.3 OVERWEIGHT: ICD-10-CM

## 2022-08-30 DIAGNOSIS — K42.9 UMBILICAL HERNIA W/OUT OBSTRUCTION OR GANGRENE: ICD-10-CM

## 2022-08-30 DIAGNOSIS — E73.9 LACTOSE INTOLERANCE, UNSPECIFIED: ICD-10-CM

## 2022-08-30 PROCEDURE — 99204 OFFICE O/P NEW MOD 45 MIN: CPT

## 2022-08-30 PROCEDURE — 82274 ASSAY TEST FOR BLOOD FECAL: CPT | Mod: QW

## 2022-08-30 NOTE — HISTORY OF PRESENT ILLNESS
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  He has been followed by Dr. Antonino Marrero over the years, now referred here as Dr. Marrero retired. GI symptoms have been well-controlled, averaging 2-3 BMs daily, on Apriso 1 cap twice daily.  He has never required bowel resection, only requiring rectal abscess drainage/fistulotomy (and appendectomy --retrocecal).  He has never been on steroids for the Crohn's, only for reactive airway disease. He has history of colonic polyps (including flat serrated adenoma), with last colonoscopy August 2020 revealing ascending colonic lipoma; he was advised to undergo surveillance colonoscopy three years hence.  He has lactose intolerance. His son is gluten sensitive, his brother had lymphoma, and a maternal nephew had colonic neuroendocrine tumor.

## 2022-08-30 NOTE — PHYSICAL EXAM
[General Appearance - Alert] : alert [General Appearance - In No Acute Distress] : in no acute distress [General Appearance - Well Nourished] : well nourished [General Appearance - Well Developed] : well developed [Sclera] : the sclera and conjunctiva were normal [Neck Appearance] : the appearance of the neck was normal [Neck Cervical Mass (___cm)] : no neck mass was observed [Jugular Venous Distention Increased] : there was no jugular-venous distention [Thyroid Diffuse Enlargement] : the thyroid was not enlarged [Thyroid Nodule] : there were no palpable thyroid nodules [Auscultation Breath Sounds / Voice Sounds] : lungs were clear to auscultation bilaterally [Heart Rate And Rhythm] : heart rate was normal and rhythm regular [Heart Sounds] : normal S1 and S2 [Heart Sounds Gallop] : no gallops [Murmurs] : no murmurs [Heart Sounds Pericardial Friction Rub] : no pericardial rub [Full Pulse] : the pedal pulses are present [Edema] : there was no peripheral edema [Bowel Sounds] : normal bowel sounds [Abdomen Soft] : soft [Abdomen Tenderness] : non-tender [Abdomen Mass (___ Cm)] : no abdominal mass palpated [No Rectal Mass] : no rectal mass [Prostate Size___ (Scale 0-4)] : prostate size was [unfilled] on a scale of 0-4 [Cervical Lymph Nodes Enlarged Posterior Bilaterally] : posterior cervical [Cervical Lymph Nodes Enlarged Anterior Bilaterally] : anterior cervical [Supraclavicular Lymph Nodes Enlarged Bilaterally] : supraclavicular [Inguinal Lymph Nodes Enlarged Bilaterally] : inguinal [Nail Clubbing] : no clubbing  or cyanosis of the fingernails [Musculoskeletal - Swelling] : no joint swelling seen [Skin Color & Pigmentation] : normal skin color and pigmentation [Skin Turgor] : normal skin turgor [] : no rash [Oriented To Time, Place, And Person] : oriented to person, place, and time [Impaired Insight] : insight and judgment were intact [Affect] : the affect was normal [External Hemorrhoid] : no external hemorrhoids [Occult Blood Positive] : stool was negative for occult blood [FreeTextEntry1] : mild anal stenosis; FIT negative.  Scar left buttock  [Prostate Tenderness] : was not tender

## 2022-08-30 NOTE — CONSULT LETTER
[Dear  ___] : Dear  [unfilled], [Consult Letter:] : I had the pleasure of evaluating your patient, [unfilled]. [Please see my note below.] : Please see my note below. [Consult Closing:] : Thank you very much for allowing me to participate in the care of this patient.  If you have any questions, please do not hesitate to contact me. [Sincerely,] : Sincerely, [FreeTextEntry3] : Ady Navas M.D.\par

## 2022-08-30 NOTE — ASSESSMENT
[FreeTextEntry1] : 1.  Crohn disease diagnosed mid-1970s, clinically quiescent on low-dose mesalamine.  Status post surgery for rectal fistula/abscess.\par 2.  History of colonic polyps, with ascending colon lipoma at last colonoscopy August 2020.  Stool guaiac negative with negative fecal immunochemical test on today's exam.\par 3.  Umbilical hernia, reducible.\par 4.  Overweight.\par 5.  Hypertension.\par 6.  Hyperlipidemia.\par 7.  Urolithiasis. BPH.\par 8.  Prediabetes.\par 9.  Chronic renal insufficiency stage III.\par 10.  History of legionnaires.  Status post COVID-19.\par 11.  Lactose intolerance.\par 12.  Status post appendectomy.\par 13.  Seasonal allergies.\par \par Plan:\par 1.  Extensive medical records have been reviewed.\par 2.  Continue Apriso indefinitely.\par 3.  No role for umbilical herniorrhaphy in the absence of symptoms.\par 4.  Return in 1 year, for consideration of surveillance colonoscopy soon thereafter, if the potential benefits exceed the risks at that point.

## 2022-08-30 NOTE — REVIEW OF SYSTEMS
[Negative] : Heme/Lymph [As Noted in HPI] : as noted in HPI [FreeTextEntry3] : Glasses [FreeTextEntry4] : Hearing aids

## 2022-09-02 ENCOUNTER — APPOINTMENT (OUTPATIENT)
Dept: PHARMACY | Facility: CLINIC | Age: 77
End: 2022-09-02

## 2022-09-02 PROCEDURE — V5299A: CUSTOM | Mod: NC

## 2022-09-04 ENCOUNTER — RX RENEWAL (OUTPATIENT)
Age: 77
End: 2022-09-04

## 2022-09-06 ENCOUNTER — APPOINTMENT (OUTPATIENT)
Dept: PHARMACY | Facility: CLINIC | Age: 77
End: 2022-09-06

## 2022-09-06 PROCEDURE — V5299A: CUSTOM | Mod: NC

## 2022-09-20 NOTE — ED ADULT NURSE NOTE - TEMPLATE LIST FOR HEAD TO TOE ASSESSMENT
Reviewed results with patient's .  She will complete the stool test today.  If positive, will recommend to see GI.  If negative, will consider Hematology evaluation for IV iron and possibly also GI eval.  Start iron supplement now.  Follow up 4 weeks, sooner PRN General

## 2022-10-10 NOTE — ED ADULT NURSE NOTE - MUSCULOSKELETAL WDL
Full range of motion of upper and lower extremities, no joint tenderness/swelling. Oxybutynin Counseling:  I discussed with the patient the risks of oxybutynin including but not limited to skin rash, drowsiness, dry mouth, difficulty urinating, and blurred vision.

## 2022-10-28 ENCOUNTER — RX RENEWAL (OUTPATIENT)
Age: 77
End: 2022-10-28

## 2022-11-23 LAB
RAPID RVP RESULT: NOT DETECTED
SARS-COV-2 RNA PNL RESP NAA+PROBE: NOT DETECTED

## 2022-12-21 ENCOUNTER — APPOINTMENT (OUTPATIENT)
Dept: PHARMACY | Facility: CLINIC | Age: 77
End: 2022-12-21

## 2022-12-21 ENCOUNTER — APPOINTMENT (OUTPATIENT)
Dept: SPEECH THERAPY | Facility: CLINIC | Age: 77
End: 2022-12-21
Payer: SELF-PAY

## 2022-12-21 ENCOUNTER — OUTPATIENT (OUTPATIENT)
Dept: OUTPATIENT SERVICES | Facility: HOSPITAL | Age: 77
LOS: 1 days | Discharge: HOME | End: 2022-12-21

## 2022-12-21 DIAGNOSIS — Z90.49 ACQUIRED ABSENCE OF OTHER SPECIFIED PARTS OF DIGESTIVE TRACT: Chronic | ICD-10-CM

## 2022-12-21 DIAGNOSIS — K61.0 ANAL ABSCESS: Chronic | ICD-10-CM

## 2022-12-21 PROCEDURE — V5014D: CUSTOM

## 2022-12-21 PROCEDURE — 92557 COMPREHENSIVE HEARING TEST: CPT

## 2022-12-21 PROCEDURE — V5010 ASSESSMENT FOR HEARING AID: CPT

## 2022-12-21 PROCEDURE — 92567 TYMPANOMETRY: CPT

## 2022-12-21 PROCEDURE — V5299I: CUSTOM

## 2022-12-21 NOTE — PROCEDURE
[de-identified] : Counselled pt re: new technologies and pt opted to order Oticon More 1 MR-R in [employer]. Purchase agreement signed.  Pt advised he will need medical clearance for dispensing, but he would prefer to sign medical waiver. 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  However, he preferred to have this replacement device No. [id number] assigned to the right ear.   swapped out and replaced with right. Programmed aid to new settings for monaural fit.  [employer]Id did not charge fully, still red on , but was able to program.  Advised that pt needs to place aid on  at home. He expressed understanding.  \par \par Order placed for new aids and HAD scheduled for next week.

## 2022-12-21 NOTE — PLAN
[FreeTextEntry2] : Return for dispensing of Oticon MOre 1 MR-R hearing aids and continued use of binaural amplification.

## 2022-12-21 NOTE — HISTORY OF PRESENT ILLNESS
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  [FreeTextEntry8] : Pt fit with amplification 2.5 years ago.\par \par Ears Fit: AU\par HA : Oticon\par HA model: Opn S2 MR-R\par Receivers: 2 85 (R); 2 85 (L); both with retention wires\par Domes: 8mm dv base\par  SN: [id number]\par Repair warranty: 8/8/2023\par L/D warranty: 8/8/2023\par \par Accessories:\par TV adaptor 3.0 SN: [id number]\par \par He returns today to  replacement for lost hearing aid  and to place an order new binaural amplification.  Pt lost both aids, but only has L and D on the left.   Audio evaluation performed earlier today, revealed bilateral SNHL, mild through 4K sloping to severe at 8K Au.

## 2022-12-28 ENCOUNTER — APPOINTMENT (OUTPATIENT)
Dept: PHARMACY | Facility: CLINIC | Age: 77
End: 2022-12-28

## 2022-12-30 NOTE — PROCEDURE
[] : Acoustic Immittance: [226 Hz] : 226 Hz [Normal Eardrum Mobility] : consistent with normal eardrum mobility [Type A Tympanogram] : Type A Normal [] : Complete Audiological Evaluation [Good] : good [Insert Ear Phones] : insert ear phones [de-identified] : Normal hearing to 2kHz sloping to mild sensorineural hearing loss to 4kHz sloping to severe sensorineural hearing loss, bilaterally. Excellent SRS scores, bilaterally.

## 2022-12-30 NOTE — PLAN
[FreeTextEntry2] : \par 1. Continued binaural amplification and HAE scheduled today. \par 2. Annual hearing test or sooner if notice change or per MD

## 2022-12-30 NOTE — ASSESSMENT
[FreeTextEntry1] : Counseled patient on today's results and recommendations. Results are essentially consistent with audiological evaluation dated 6/24/2020.

## 2022-12-30 NOTE — HISTORY OF PRESENT ILLNESS
[FreeTextEntry1] : 77 year old male with known normal sloping to severe high frequency sensorineural hearing loss AU and hearing aid user, bilaterally.  [FreeTextEntry8] : Patient arrived for appointment for updated audiogram due to purchasing new hearing aids. Patient reported no change in hearing. No reports of tinnitus, otalgia, ear infections or dizziness.

## 2023-01-04 ENCOUNTER — APPOINTMENT (OUTPATIENT)
Dept: PHARMACY | Facility: CLINIC | Age: 78
End: 2023-01-04
Payer: COMMERCIAL

## 2023-01-04 PROCEDURE — V5261A: CUSTOM

## 2023-01-05 NOTE — PROCEDURE
[de-identified] : Pt opted to sign Medical Waiver. \par \par New Oticon More 1 Mr-R Hearing aids programmed to most recent audio and set at full acclim.  Benefit verified to NAL targets with good match. \par \par Reviewed care, use and insertion.  Paired to phone and demonstrated phone call.  Downloaded Dayanara and reviewed use.  Also dispensed complementary TV Streamer. Paired steamer with hearing aids and discussed connection to TV.  Provided O[employer]on Connectivity contact number for assistance if needed. Purchase agreement reviewed and signed.\par \par Also reviewed options for use with stethoscope, ie amplified stethoscope, head phone options and bluetooth options.  Pt will look into options and will review again at next visit.  \par

## 2023-01-05 NOTE — HISTORY OF PRESENT ILLNESS
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 Fit with amplification for the first time in 2020. [FreeTextEntry8] : Pt was using binaural Oticon Opn S hearing aids, but lost both aids, replaced Left on L and D.  Hearing aid consultation was performed two weeks ago and new Oticon More 1 MR-R hearing aids were recommended and ordered.    Recent Audio evaluation revealed bilateral SNHL, mild through 4K sloping to severe at 8K Au.

## 2023-01-05 NOTE — PLAN
[FreeTextEntry2] : Use of Oticon MOre 1 MR-R hearing aids on a trial basis\par HA check in 6 weeks, will discuss stethoscope use at that time.

## 2023-02-08 ENCOUNTER — APPOINTMENT (OUTPATIENT)
Dept: PHARMACY | Facility: CLINIC | Age: 78
End: 2023-02-08
Payer: SELF-PAY

## 2023-02-08 PROCEDURE — V5299A: CUSTOM | Mod: NC

## 2023-02-12 ENCOUNTER — TRANSCRIPTION ENCOUNTER (OUTPATIENT)
Age: 78
End: 2023-02-12

## 2023-02-13 ENCOUNTER — NON-APPOINTMENT (OUTPATIENT)
Age: 78
End: 2023-02-13

## 2023-02-19 ENCOUNTER — RX RENEWAL (OUTPATIENT)
Age: 78
End: 2023-02-19

## 2023-03-20 DIAGNOSIS — H90.3 SENSORINEURAL HEARING LOSS, BILATERAL: ICD-10-CM

## 2023-05-01 ENCOUNTER — RX RENEWAL (OUTPATIENT)
Age: 78
End: 2023-05-01

## 2023-08-09 ENCOUNTER — APPOINTMENT (OUTPATIENT)
Dept: GASTROENTEROLOGY | Facility: CLINIC | Age: 78
End: 2023-08-09
Payer: COMMERCIAL

## 2023-08-09 VITALS
BODY MASS INDEX: 29.66 KG/M2 | HEIGHT: 67 IN | WEIGHT: 189 LBS | DIASTOLIC BLOOD PRESSURE: 83 MMHG | SYSTOLIC BLOOD PRESSURE: 160 MMHG | HEART RATE: 66 BPM

## 2023-08-09 DIAGNOSIS — K50.90 CROHN'S DISEASE, UNSPECIFIED, W/OUT COMPLICATIONS: ICD-10-CM

## 2023-08-09 DIAGNOSIS — R14.0 ABDOMINAL DISTENSION (GASEOUS): ICD-10-CM

## 2023-08-09 DIAGNOSIS — K57.30 DIVERTICULOSIS OF LARGE INTESTINE W/OUT PERFORATION OR ABSCESS W/OUT BLEEDING: ICD-10-CM

## 2023-08-09 DIAGNOSIS — Z86.010 PERSONAL HISTORY OF COLONIC POLYPS: ICD-10-CM

## 2023-08-09 PROCEDURE — 82274 ASSAY TEST FOR BLOOD FECAL: CPT | Mod: QW

## 2023-08-09 PROCEDURE — 99214 OFFICE O/P EST MOD 30 MIN: CPT | Mod: 25

## 2023-08-09 RX ORDER — PEG-3350, SODIUM SULFATE, SODIUM CHLORIDE, POTASSIUM CHLORIDE, SODIUM ASCORBATE AND ASCORBIC ACID 7.5-2.691G
100 KIT ORAL
Qty: 1 | Refills: 0 | Status: ACTIVE | COMMUNITY
Start: 2023-08-09 | End: 1900-01-01

## 2023-08-09 RX ORDER — MESALAMINE 0.38 G/1
0.38 CAPSULE, EXTENDED RELEASE ORAL
Qty: 180 | Refills: 3 | Status: ACTIVE | COMMUNITY
Start: 2015-12-29 | End: 1900-01-01

## 2023-08-09 NOTE — HISTORY OF PRESENT ILLNESS
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  He has been averaging 3 BMs daily, usually occurring between 7-10 AM, maintained on Apriso 375 mg twice daily.  He also reports excessive flatulence.  There is a family history of celiac disease.  He has history of colonic polyps (including flat serrated adenoma), with last colonoscopy August 2020 (Dr. Marrero) revealing ascending colonic lipoma and sigmoid diverticulosis; he was advised to undergo surveillance colonoscopy three years hence.

## 2023-08-09 NOTE — ASSESSMENT
[FreeTextEntry1] : 1.  Crohn disease (perianal/SB) diagnosed mid-1970s, clinically quiescent on low-dose mesalamine.  Status post surgery for rectal fistula/abscess. 2.  History of colonic polyps, with sigmoid diverticulosis and ascending colon lipoma at last colonoscopy August 2020--rule out smoldering inflammation, dysplasia, neoplasm.  Gassiness may be related to diet, scar tissue, meds, but must rule out concomitant celiac disease, as well. 3.  Umbilical hernia, reducible. 4.  Overweight. 5.  Hypertension. 6.  Hyperlipidemia. 7.  Urolithiasis. BPH. 8.  Prediabetes. 9.  Chronic renal insufficiency stage III. 10.  History of Legionnaires'.  Status post COVID-19. 11.  Lactose intolerance. 12.  Status post appendectomy. 13.  Seasonal allergies.  Plan: 1.  Medical records have been reviewed. 2.  Continue Apriso indefinitely. 3.  Extensive bloodwork (fasting) to be drawn at lab.   4.  Lengthy discussion ensued regarding potential risks/benefits of undergoing surveillance colonoscopy (and possible EGD with small bowel biopsies, if celiac panel abnormal and/or iron deficient).  Procedures, rationale, alternatives, material risks, anesthesia plan, and MiraLAX vs. split dose prep (such as Mova prep) instructions were reviewed and brochures given. 5.  Other recommendations to follow.

## 2023-08-09 NOTE — PHYSICAL EXAM
[Alert] : alert [No Acute Distress] : no acute distress [Well Developed] : well developed [Well Nourished] : well nourished [None] : no edema [Bowel Sounds] : normal bowel sounds [No Masses] : no abdominal mass palpated [Abdomen Soft] : soft [] : no hepatosplenomegaly [Occult Blood] : negative occult blood [FIT Test] : negative FIT test [+3] : prostate: +3 [Normal] : oriented to person, place, and time [de-identified] : moderately overweight [FreeTextEntry1] : glasses [de-identified] : hearing aids [de-identified] : surgical scars; small reducible umbilical hernia [de-identified] : mild anal stenosis; scarring left buttock near anus [de-identified] : somewhat pale

## 2023-08-09 NOTE — CONSULT LETTER
[Dear  ___] : Dear  [unfilled], [Courtesy Letter:] : I had the pleasure of seeing your patient, [unfilled], in my office today. [Please see my note below.] : Please see my note below. [Consult Closing:] : Thank you very much for allowing me to participate in the care of this patient.  If you have any questions, please do not hesitate to contact me. [Sincerely,] : Sincerely, [FreeTextEntry3] : Ady Navas M.D.

## 2023-08-10 DIAGNOSIS — N20.0 CALCULUS OF KIDNEY: ICD-10-CM

## 2023-08-16 ENCOUNTER — RX RENEWAL (OUTPATIENT)
Age: 78
End: 2023-08-16

## 2023-09-22 ENCOUNTER — EMERGENCY (EMERGENCY)
Facility: HOSPITAL | Age: 78
LOS: 1 days | Discharge: HOME | End: 2023-09-22
Attending: EMERGENCY MEDICINE
Payer: COMMERCIAL

## 2023-09-22 VITALS
RESPIRATION RATE: 20 BRPM | SYSTOLIC BLOOD PRESSURE: 166 MMHG | DIASTOLIC BLOOD PRESSURE: 91 MMHG | OXYGEN SATURATION: 97 % | TEMPERATURE: 98 F | HEART RATE: 59 BPM

## 2023-09-22 VITALS
RESPIRATION RATE: 18 BRPM | HEART RATE: 60 BPM | DIASTOLIC BLOOD PRESSURE: 83 MMHG | SYSTOLIC BLOOD PRESSURE: 167 MMHG | TEMPERATURE: 98 F | OXYGEN SATURATION: 96 %

## 2023-09-22 DIAGNOSIS — Z90.49 ACQUIRED ABSENCE OF OTHER SPECIFIED PARTS OF DIGESTIVE TRACT: Chronic | ICD-10-CM

## 2023-09-22 DIAGNOSIS — K61.0 ANAL ABSCESS: Chronic | ICD-10-CM

## 2023-09-22 LAB
ALBUMIN SERPL ELPH-MCNC: 4.5 G/DL — SIGNIFICANT CHANGE UP (ref 3.3–5)
ALP SERPL-CCNC: 77 U/L — SIGNIFICANT CHANGE UP (ref 40–120)
ALT FLD-CCNC: 26 U/L — SIGNIFICANT CHANGE UP (ref 10–45)
ANION GAP SERPL CALC-SCNC: 11 MMOL/L — SIGNIFICANT CHANGE UP (ref 5–17)
AST SERPL-CCNC: 20 U/L — SIGNIFICANT CHANGE UP (ref 10–40)
BASOPHILS # BLD AUTO: 0.03 K/UL — SIGNIFICANT CHANGE UP (ref 0–0.2)
BASOPHILS NFR BLD AUTO: 0.4 % — SIGNIFICANT CHANGE UP (ref 0–2)
BILIRUB SERPL-MCNC: 0.4 MG/DL — SIGNIFICANT CHANGE UP (ref 0.2–1.2)
BUN SERPL-MCNC: 24 MG/DL — HIGH (ref 7–23)
CALCIUM SERPL-MCNC: 9.6 MG/DL — SIGNIFICANT CHANGE UP (ref 8.4–10.5)
CHLORIDE SERPL-SCNC: 103 MMOL/L — SIGNIFICANT CHANGE UP (ref 96–108)
CO2 SERPL-SCNC: 25 MMOL/L — SIGNIFICANT CHANGE UP (ref 22–31)
CREAT SERPL-MCNC: 1.43 MG/DL — HIGH (ref 0.5–1.3)
EGFR: 50 ML/MIN/1.73M2 — LOW
EOSINOPHIL # BLD AUTO: 0.02 K/UL — SIGNIFICANT CHANGE UP (ref 0–0.5)
EOSINOPHIL NFR BLD AUTO: 0.3 % — SIGNIFICANT CHANGE UP (ref 0–6)
GLUCOSE SERPL-MCNC: 127 MG/DL — HIGH (ref 70–99)
HCT VFR BLD CALC: 40 % — SIGNIFICANT CHANGE UP (ref 39–50)
HGB BLD-MCNC: 13.5 G/DL — SIGNIFICANT CHANGE UP (ref 13–17)
IMM GRANULOCYTES NFR BLD AUTO: 0.6 % — SIGNIFICANT CHANGE UP (ref 0–0.9)
LYMPHOCYTES # BLD AUTO: 0.72 K/UL — LOW (ref 1–3.3)
LYMPHOCYTES # BLD AUTO: 9 % — LOW (ref 13–44)
MCHC RBC-ENTMCNC: 29.3 PG — SIGNIFICANT CHANGE UP (ref 27–34)
MCHC RBC-ENTMCNC: 33.8 GM/DL — SIGNIFICANT CHANGE UP (ref 32–36)
MCV RBC AUTO: 87 FL — SIGNIFICANT CHANGE UP (ref 80–100)
MONOCYTES # BLD AUTO: 0.31 K/UL — SIGNIFICANT CHANGE UP (ref 0–0.9)
MONOCYTES NFR BLD AUTO: 3.9 % — SIGNIFICANT CHANGE UP (ref 2–14)
NEUTROPHILS # BLD AUTO: 6.85 K/UL — SIGNIFICANT CHANGE UP (ref 1.8–7.4)
NEUTROPHILS NFR BLD AUTO: 85.8 % — HIGH (ref 43–77)
NRBC # BLD: 0 /100 WBCS — SIGNIFICANT CHANGE UP (ref 0–0)
PLATELET # BLD AUTO: 173 K/UL — SIGNIFICANT CHANGE UP (ref 150–400)
POTASSIUM SERPL-MCNC: 4.4 MMOL/L — SIGNIFICANT CHANGE UP (ref 3.5–5.3)
POTASSIUM SERPL-SCNC: 4.4 MMOL/L — SIGNIFICANT CHANGE UP (ref 3.5–5.3)
PROT SERPL-MCNC: 7.1 G/DL — SIGNIFICANT CHANGE UP (ref 6–8.3)
RBC # BLD: 4.6 M/UL — SIGNIFICANT CHANGE UP (ref 4.2–5.8)
RBC # FLD: 12.9 % — SIGNIFICANT CHANGE UP (ref 10.3–14.5)
SODIUM SERPL-SCNC: 139 MMOL/L — SIGNIFICANT CHANGE UP (ref 135–145)
TROPONIN T, HIGH SENSITIVITY RESULT: 11 NG/L — SIGNIFICANT CHANGE UP (ref 0–51)
TROPONIN T, HIGH SENSITIVITY RESULT: 12 NG/L — SIGNIFICANT CHANGE UP (ref 0–51)
WBC # BLD: 7.98 K/UL — SIGNIFICANT CHANGE UP (ref 3.8–10.5)
WBC # FLD AUTO: 7.98 K/UL — SIGNIFICANT CHANGE UP (ref 3.8–10.5)

## 2023-09-22 PROCEDURE — 36415 COLL VENOUS BLD VENIPUNCTURE: CPT

## 2023-09-22 PROCEDURE — 80053 COMPREHEN METABOLIC PANEL: CPT

## 2023-09-22 PROCEDURE — 85025 COMPLETE CBC W/AUTO DIFF WBC: CPT

## 2023-09-22 PROCEDURE — 84484 ASSAY OF TROPONIN QUANT: CPT

## 2023-09-22 PROCEDURE — 99285 EMERGENCY DEPT VISIT HI MDM: CPT

## 2023-09-22 PROCEDURE — 99285 EMERGENCY DEPT VISIT HI MDM: CPT | Mod: GC

## 2023-09-22 PROCEDURE — 99284 EMERGENCY DEPT VISIT MOD MDM: CPT | Mod: 25

## 2023-09-22 PROCEDURE — 93005 ELECTROCARDIOGRAM TRACING: CPT

## 2023-09-22 RX ORDER — SODIUM CHLORIDE 9 MG/ML
1000 INJECTION INTRAMUSCULAR; INTRAVENOUS; SUBCUTANEOUS ONCE
Refills: 0 | Status: COMPLETED | OUTPATIENT
Start: 2023-09-22 | End: 2023-09-22

## 2023-09-22 RX ORDER — VALSARTAN 80 MG/1
1 TABLET ORAL
Qty: 14 | Refills: 0
Start: 2023-09-22 | End: 2023-10-05

## 2023-09-22 NOTE — CONSULT NOTE ADULT - ASSESSMENT
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 Complains of intermittent chest pain with exertion.   -EKG: sinus rhythm with leftward axis, TWI across the precordial leads. TWI deeper than prior EKG  -Obtain TTE  -Monitor on Telemetry   -Check Lipids, A1c, TSH/FT4, CBC, CMP.   -Likely will Pursue CTA coronaries, pending above results. Please ensure 18 guage iv In AC  -BP control, c/w valsartan in renal function ok, and may need in increase  -workup of Nausea and vomiting per primary team.     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 Complains of intermittent chest pain with exertion, but not the reason for presentation to the emergency room.    -EKG: sinus rhythm with leftward axis, TWI across the precordial leads. TWI deeper than prior EKG  -Monitor on Telemetry   -BP control, Increase valsartan to 80mg PO daily.   -workup of Nausea and vomiting per primary team.   -HS tropsT= 12-> repeat, If wnl, patient can be discharged and followup outpatient for stress test, with return precautions. Joint decision making, given his cr, defer CTA coronary at this time.       Patel Partida, Cardiology Fellow, F-2    For all New Consults and Questions:  [email or web address]   Login: [email or web address]    *** Note not final until signed by attending

## 2023-09-22 NOTE — CONSULT NOTE ADULT - SUBJECTIVE AND OBJECTIVE BOX
Patient seen and evaluated at bedside    Chief Complaint: nausea and vomiting.     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 Patient states that he was in his usual state of health until 2 days ago when her started to have nausea and vomiting, he was ok yesterday, but this morning he had recurrence of symptoms, checked his BP which was elevated to -170's and went to see his internist, who sent him to the ED for further evaluation. Pt reports he can walk more than 1/2 mile at baseline, occasionally will develop some chest discomfort while walking a the 1/2  mile. He denies any chest pain at rest, shortness of breath , orthopnea, PND, or PND. He has some LE edema that is worse after standing on his feet all day and better in AM. He denies any headache or  blurry vision, runny nose, sore throat, cough, abd pain, or diarrhea.      PMHx:   Cervicalgia    IBD (inflammatory bowel disease)    HLD (hyperlipidemia)    HTN (hypertension)    Elevated PSA        PSHx:   No significant past surgical history    Abscess, perianal    S/P appendectomy        Allergies:  No Known Allergies      Home Meds:    Current Medications:   valsartan  atorvastatin  allopurinol  masalamine    FAMILY HISTORY:  Family history of dementia (Mother)    Family history of lung cancer (Father)        Social History:  Smoking History:denies  Alcohol Use: occasional small amount.   Drug Use:denies    REVIEW OF SYSTEMS:  Constitutional:     [x ] negative [ ] fevers [ ] chills [ ] weight loss [ ] weight gain  HEENT:                  [ x] negative [ ] dry eyes [ ] eye irritation [ ] postnasal drip [ ] nasal congestion  CV:                         as above[ ] negative  [ ] chest pain [ ] orthopnea [ ] palpitations [ ] murmur  Resp:                    as above [ ] negative [ ] cough [ ] shortness of breath [ ] dyspnea [ ] wheezing [ ] sputum [ ]hemoptysis  GI:                         as above [ ] negative [ ] nausea [ ] vomiting [ ] diarrhea [ ] constipation [ ] abd pain [ ] dysphagia   :                        [x ] negative [ ] dysuria [ ] nocturia [ ] hematuria [ ] increased urinary frequency  Musculoskeletal: [x ] negative [ ] back pain [ ] myalgias [ ] arthralgias [ ] fracture  Skin:                       [ x] negative [ ] rash [ ] itch  Neurological:        [x ] negative [ ] headache [ ] dizziness [ ] syncope [ ] weakness [ ] numbness  Psychiatric:           [ x] negative [ ] anxiety [ ] depression  Endocrine:            x[ ] negative [ ] diabetes [ ] thyroid problem  Heme/Lymph:      [x ] negative [ ] anemia [ ] bleeding problem  Allergic/Immune: [ x] negative [ ] itchy eyes [ ] nasal discharge [ ] hives [ ] angioedema    [x ] All other systems negative        Physical Exam:  T(F): 97.9 (09-22), Max: 97.9 (09-22)  HR: 71 (09-22) (59 - 71)  BP: 171/84 (09-22) (166/91 - 171/84)  RR: 20 (09-22)  SpO2: 99% (09-22)  GENERAL: No acute distress, well-developed  HEAD:  Atraumatic, Normocephalic  ENT: EOMI, PERRLA, conjunctiva and sclera clear, Neck supple, No JVD, moist mucosa  CHEST/LUNG: Clear to auscultation bilaterally; No wheeze, equal breath sounds bilaterally   BACK: No spinal tenderness  HEART: Regular rate and rhythm; No murmurs, rubs, or gallops  ABDOMEN: Soft, Nontender, Nondistended; Bowel sounds present  EXTREMITIES:  No clubbing, cyanosis, or edema  PSYCH: Nl behavior, nl affect  NEUROLOGY: AAOx3, non-focal, cranial nerves intact  SKIN: Normal color, No rashes or lesions    Cardiovascular Diagnostic Testing:    ECG: Personally reviewed:sinus rhythm with leftward axis, TWI across the precordial leads.       CXR: Personally reviewed    Labs: Personally reviewed

## 2023-09-22 NOTE — ED PROVIDER NOTE - NS ED ATTENDING STATEMENT MOD
This was a shared visit with the CLIVE. I reviewed and verified the documentation and independently performed the documented:

## 2023-09-22 NOTE — ED PROVIDER NOTE - NSFOLLOWUPINSTRUCTIONS_ED_ALL_ED_FT
1. Please follow up with your Primary Care Doctor after discharge, bring a copy of your results to follow up appointment for review     2. Please rest, stay hydrated. Please increase your dose of Valsartan to 80mg daily otherwise continue all at home medications as previously prescribed    3. Follow up with Cardiology after discharge for reevaluation and continued management. Please see office information below to call and schedule appointment:       Pietro Hammer    Cardiovascular Disease    [facility] [facility], [address]    [place] [address]    Phone: [phone]    Fax: [phone]    4. Return to ED for any new or worsened symptoms of concern

## 2023-09-22 NOTE — ED PROVIDER NOTE - PHYSICAL EXAMINATION
CONSTITUTIONAL: Patient is awake, alert and oriented x 3. Patient is well appearing and in no acute distress  HEAD: NCAT  NECK: supple, FROM  LUNGS: CTA b/l, no wheezing or rales   HEART: RRR.+S1S2 no murmurs  ABDOMEN: Soft, non-distended, nttp,  no rebound or guarding  EXTREMITY: no edema or calf tenderness b/l, FROM upper and lower ext b/l  SKIN: with no rash or lesions  NEURO: No focal deficits

## 2023-09-22 NOTE — ED PROVIDER NOTE - OBJECTIVE STATEMENT
78-year-old male with past medical history hypertension, hyperlipidemia, CKD, IBD presents emergency department for intermittent nausea and vomiting.  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  Symptoms resolved but today he had a very similar episode which was more intense than the initial.  Patient was concerned about his symptoms and went to his doctor who advised he come to the emergency department for further evaluation.  Patient reports that last stress test was approximately 15 years ago.  He notes that he has noticed some dyspnea and chest tightness with walking approximately half mile.  He denies chest pain with every day ambulation or at rest.  Denies headaches, dizziness, shortness of breath, abdominal pain nausea or vomiting. 78-year-old male with past medical history hypertension, hyperlipidemia, CKD, IBD presents emergency department for intermittent nausea and vomiting.  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  Symptoms resolved but today he had a very similar episode which was more intense than the initial.  Patient was concerned about his symptoms and went to his doctor who advised he come to the emergency department for further evaluation. He was given zofran by EMS with improved nausea.  Patient reports that last stress test was approximately 15 years ago.  He notes that he has noticed some dyspnea and chest tightness with walking approximately half mile.  He denies chest pain with every day ambulation or at rest.  Denies headaches, dizziness, shortness of breath, abdominal pain nausea or vomiting.

## 2023-09-22 NOTE — ED PROVIDER NOTE - ATTENDING APP SHARED VISIT CONTRIBUTION OF CARE
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  denies CP, SOB.  nontoxic on exam, conversant.  cardiology consult --> at the bedside.  CBC, CMP, trops.  dispo per cards, possible stress today.

## 2023-09-22 NOTE — ED PROVIDER NOTE - PATIENT PORTAL LINK FT
You can access the FollowMyHealth Patient Portal offered by [facility] by registering at the following website: [email or web address]. By joining [employer]’s FollowMyHealth portal, you will also be able to view your health information using other applications (apps) compatible with our system.

## 2023-09-22 NOTE — ED PROVIDER NOTE - SHIFT CHANGE DETAILS
Attending MD Argueta: 78M w nausea, episodes 2d ago and again today, cards seen already, dispo as per cards, asymptomatic now

## 2023-09-22 NOTE — ED PROVIDER NOTE - PROGRESS NOTE DETAILS
Initial plan from cardiology was to obtain CTC, after eval w/ attending plan changed to trend trop and if stable d/c home w/ adjustment of BP meds. Pt trop 12 and 11. Discussed w/ cards attending Dr. Hammer. Will d/c home   [place]iana Calles PA-C Attending MD Argueta: Spoke with Dr. Hammer in the Emergency Department.  Will increase Valsartan to 80mg.  Patient reports has enough to do so for about a week.  Discussed labs with patient, verbalized understanding.  Follow up instructions given, importance of follow up emphasized, return to ED parameters reviewed and patient verbalized understanding.  All questions answered, all concerns addressed.

## 2023-09-22 NOTE — ED PROVIDER NOTE - ENMT NEGATIVE STATEMENT, MLM
vm left pt needing to be here before noon for ppd reading.  
Ears: no ear pain and no hearing problems. Nose: no nasal congestion and no nasal drainage. Mouth/Throat: no dysphagia, no hoarseness and no throat pain. Neck: no lumps, no pain, no stiffness and no swollen glands.

## 2023-09-22 NOTE — ED ADULT NURSE NOTE - OBJECTIVE STATEMENT
patient BIBA, received alert & oriented x4. 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 Symptoms started 3 days ago & felt better yesterday & symptoms reoccurred today. Upon arrival patient denies pain. NSR-71/min. Placed on continuous cardiac monitoring. Denies dizziness or lightheadedness. Monitoring maintained. Cardio team at bedside & patient was on a telephone conversation with his PMD.

## 2023-09-22 NOTE — CONSULT NOTE ADULT - ATTENDING COMMENTS
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 Nausea resolved. Also relates in past month occasional chest tightness walking up hills in high heat and humidity, but many other days walks good distances without symptoms. He has hypercholesterolemia with LDL down to 58 on low dose rosuvastatin. Has history of kidney stones and mild CKD, today mild KACEY, creatinine up to 1.43. Blood pressure now moderated and he is asymptomatic. Troponin 11 and 12. Recommend discharge to home on increase Valsartan to 80 mg. Gave him my card for follow up in my office next week.    To contact call Cardiology Fellow or Attending as listed on [email or web address] password: [email or web address].

## 2023-09-26 ENCOUNTER — APPOINTMENT (OUTPATIENT)
Dept: NEPHROLOGY | Facility: CLINIC | Age: 78
End: 2023-09-26
Payer: COMMERCIAL

## 2023-09-26 VITALS
WEIGHT: 189 LBS | HEART RATE: 60 BPM | SYSTOLIC BLOOD PRESSURE: 148 MMHG | BODY MASS INDEX: 29.66 KG/M2 | DIASTOLIC BLOOD PRESSURE: 78 MMHG | HEIGHT: 67 IN

## 2023-09-26 DIAGNOSIS — N18.32 CHRONIC KIDNEY DISEASE, STAGE 3B: ICD-10-CM

## 2023-09-26 PROCEDURE — 99204 OFFICE O/P NEW MOD 45 MIN: CPT

## 2023-09-27 LAB
ALBUMIN SERPL ELPH-MCNC: 4.9 G/DL
ANION GAP SERPL CALC-SCNC: 13 MMOL/L
APPEARANCE: CLEAR
BACTERIA: NEGATIVE /HPF
BILIRUBIN URINE: NEGATIVE
BLOOD URINE: NEGATIVE
BUN SERPL-MCNC: 20 MG/DL
CALCIUM SERPL-MCNC: 9.8 MG/DL
CALCIUM SERPL-MCNC: 9.8 MG/DL
CAST: 2 /LPF
CHLORIDE SERPL-SCNC: 103 MMOL/L
CO2 SERPL-SCNC: 25 MMOL/L
COLOR: NORMAL
CREAT SERPL-MCNC: 1.55 MG/DL
CREAT SPEC-SCNC: 123 MG/DL
DEPRECATED KAPPA LC FREE/LAMBDA SER: 1.71 RATIO
EGFR: 46 ML/MIN/1.73M2
EPITHELIAL CELLS: 0 /HPF
ESTIMATED AVERAGE GLUCOSE: 120 MG/DL
GLUCOSE QUALITATIVE U: NEGATIVE MG/DL
GLUCOSE SERPL-MCNC: 82 MG/DL
HBA1C MFR BLD HPLC: 5.8 %
KAPPA LC CSF-MCNC: 1.02 MG/DL
KAPPA LC SERPL-MCNC: 1.74 MG/DL
KETONES URINE: NEGATIVE MG/DL
LEUKOCYTE ESTERASE URINE: NEGATIVE
MAGNESIUM SERPL-MCNC: 2.4 MG/DL
MICROALBUMIN 24H UR DL<=1MG/L-MCNC: 2.6 MG/DL
MICROALBUMIN/CREAT 24H UR-RTO: 21 MG/G
MICROSCOPIC-UA: NORMAL
NITRITE URINE: NEGATIVE
PARATHYROID HORMONE INTACT: 48 PG/ML
PH URINE: 6
PHOSPHATE SERPL-MCNC: 3.1 MG/DL
POTASSIUM SERPL-SCNC: 5.1 MMOL/L
PROTEIN URINE: NEGATIVE MG/DL
RED BLOOD CELLS URINE: 0 /HPF
SODIUM SERPL-SCNC: 142 MMOL/L
SPECIFIC GRAVITY URINE: 1.02
URATE SERPL-MCNC: 6.4 MG/DL
UROBILINOGEN URINE: 1 MG/DL
WHITE BLOOD CELLS URINE: 1 /HPF

## 2023-09-28 ENCOUNTER — NON-APPOINTMENT (OUTPATIENT)
Age: 78
End: 2023-09-28

## 2023-09-28 LAB — ALDOSTERONE SERUM: 6 NG/DL

## 2023-09-29 ENCOUNTER — OUTPATIENT (OUTPATIENT)
Dept: OUTPATIENT SERVICES | Facility: HOSPITAL | Age: 78
LOS: 1 days | End: 2023-09-29
Payer: COMMERCIAL

## 2023-09-29 ENCOUNTER — APPOINTMENT (OUTPATIENT)
Dept: CT IMAGING | Facility: CLINIC | Age: 78
End: 2023-09-29
Payer: COMMERCIAL

## 2023-09-29 DIAGNOSIS — Z90.49 ACQUIRED ABSENCE OF OTHER SPECIFIED PARTS OF DIGESTIVE TRACT: Chronic | ICD-10-CM

## 2023-09-29 DIAGNOSIS — Z00.8 ENCOUNTER FOR OTHER GENERAL EXAMINATION: ICD-10-CM

## 2023-09-29 DIAGNOSIS — R94.31 ABNORMAL ELECTROCARDIOGRAM [ECG] [EKG]: ICD-10-CM

## 2023-09-29 DIAGNOSIS — K61.0 ANAL ABSCESS: Chronic | ICD-10-CM

## 2023-09-29 PROCEDURE — 75574 CT ANGIO HRT W/3D IMAGE: CPT

## 2023-09-29 PROCEDURE — 75574 CT ANGIO HRT W/3D IMAGE: CPT | Mod: 26

## 2023-10-02 ENCOUNTER — OUTPATIENT (OUTPATIENT)
Dept: OUTPATIENT SERVICES | Facility: HOSPITAL | Age: 78
LOS: 1 days | End: 2023-10-02
Payer: COMMERCIAL

## 2023-10-02 ENCOUNTER — TRANSCRIPTION ENCOUNTER (OUTPATIENT)
Age: 78
End: 2023-10-02

## 2023-10-02 VITALS
HEART RATE: 74 BPM | OXYGEN SATURATION: 97 % | SYSTOLIC BLOOD PRESSURE: 141 MMHG | DIASTOLIC BLOOD PRESSURE: 72 MMHG | RESPIRATION RATE: 16 BRPM | TEMPERATURE: 99 F | HEIGHT: 67 IN | WEIGHT: 184.97 LBS

## 2023-10-02 DIAGNOSIS — R93.1 ABNORMAL FINDINGS ON DIAGNOSTIC IMAGING OF HEART AND CORONARY CIRCULATION: ICD-10-CM

## 2023-10-02 DIAGNOSIS — K61.0 ANAL ABSCESS: Chronic | ICD-10-CM

## 2023-10-02 DIAGNOSIS — R93.0 ABNORMAL FINDINGS ON DIAGNOSTIC IMAGING OF SKULL AND HEAD, NOT ELSEWHERE CLASSIFIED: ICD-10-CM

## 2023-10-02 DIAGNOSIS — Z90.49 ACQUIRED ABSENCE OF OTHER SPECIFIED PARTS OF DIGESTIVE TRACT: Chronic | ICD-10-CM

## 2023-10-02 LAB
ANION GAP SERPL CALC-SCNC: 13 MMOL/L — SIGNIFICANT CHANGE UP (ref 5–17)
BUN SERPL-MCNC: 24 MG/DL — HIGH (ref 7–23)
CALCIUM SERPL-MCNC: 10.1 MG/DL — SIGNIFICANT CHANGE UP (ref 8.4–10.5)
CHLORIDE SERPL-SCNC: 103 MMOL/L — SIGNIFICANT CHANGE UP (ref 96–108)
CO2 SERPL-SCNC: 25 MMOL/L — SIGNIFICANT CHANGE UP (ref 22–31)
CREAT SERPL-MCNC: 1.67 MG/DL — HIGH (ref 0.5–1.3)
EGFR: 42 ML/MIN/1.73M2 — LOW
GLUCOSE SERPL-MCNC: 121 MG/DL — HIGH (ref 70–99)
HCT VFR BLD CALC: 47.2 % — SIGNIFICANT CHANGE UP (ref 39–50)
HGB BLD-MCNC: 15.7 G/DL — SIGNIFICANT CHANGE UP (ref 13–17)
M PROTEIN SPEC IFE-MCNC: NORMAL
MCHC RBC-ENTMCNC: 28.9 PG — SIGNIFICANT CHANGE UP (ref 27–34)
MCHC RBC-ENTMCNC: 33.3 GM/DL — SIGNIFICANT CHANGE UP (ref 32–36)
MCV RBC AUTO: 86.8 FL — SIGNIFICANT CHANGE UP (ref 80–100)
METANEPHRINE, PL: 16.4 PG/ML
NORMETANEPHRINE, PL: 139.8 PG/ML
NRBC # BLD: 0 /100 WBCS — SIGNIFICANT CHANGE UP (ref 0–0)
PLATELET # BLD AUTO: 213 K/UL — SIGNIFICANT CHANGE UP (ref 150–400)
POTASSIUM SERPL-MCNC: 4.6 MMOL/L — SIGNIFICANT CHANGE UP (ref 3.5–5.3)
POTASSIUM SERPL-SCNC: 4.6 MMOL/L — SIGNIFICANT CHANGE UP (ref 3.5–5.3)
RBC # BLD: 5.44 M/UL — SIGNIFICANT CHANGE UP (ref 4.2–5.8)
RBC # FLD: 13 % — SIGNIFICANT CHANGE UP (ref 10.3–14.5)
RENIN ACTIVITY, PLASMA: 2.47 NG/ML/HR
SODIUM SERPL-SCNC: 141 MMOL/L — SIGNIFICANT CHANGE UP (ref 135–145)
WBC # BLD: 6.79 K/UL — SIGNIFICANT CHANGE UP (ref 3.8–10.5)
WBC # FLD AUTO: 6.79 K/UL — SIGNIFICANT CHANGE UP (ref 3.8–10.5)

## 2023-10-02 RX ORDER — ROSUVASTATIN CALCIUM 5 MG/1
1 TABLET ORAL
Qty: 0 | Refills: 0 | DISCHARGE

## 2023-10-02 RX ORDER — PREGABALIN 225 MG/1
1 CAPSULE ORAL
Qty: 0 | Refills: 0 | DISCHARGE

## 2023-10-02 RX ORDER — AMLODIPINE BESYLATE 2.5 MG/1
2.5 TABLET ORAL DAILY
Refills: 0 | Status: DISCONTINUED | OUTPATIENT
Start: 2023-10-02 | End: 2023-10-03

## 2023-10-02 RX ORDER — MESALAMINE 400 MG
1 TABLET, DELAYED RELEASE (ENTERIC COATED) ORAL
Qty: 0 | Refills: 0 | DISCHARGE

## 2023-10-02 RX ORDER — MESALAMINE 400 MG
0.38 TABLET, DELAYED RELEASE (ENTERIC COATED) ORAL
Refills: 0 | Status: DISCONTINUED | OUTPATIENT
Start: 2023-10-02 | End: 2023-10-03

## 2023-10-02 RX ORDER — SODIUM CHLORIDE 9 MG/ML
250 INJECTION INTRAMUSCULAR; INTRAVENOUS; SUBCUTANEOUS ONCE
Refills: 0 | Status: COMPLETED | OUTPATIENT
Start: 2023-10-02 | End: 2023-10-02

## 2023-10-02 RX ORDER — VALSARTAN 80 MG/1
160 TABLET ORAL
Refills: 0 | Status: DISCONTINUED | OUTPATIENT
Start: 2023-10-02 | End: 2023-10-03

## 2023-10-02 RX ORDER — ASPIRIN/CALCIUM CARB/MAGNESIUM 324 MG
81 TABLET ORAL DAILY
Refills: 0 | Status: DISCONTINUED | OUTPATIENT
Start: 2023-10-03 | End: 2023-10-03

## 2023-10-02 RX ORDER — UBIDECARENONE 100 MG
1 CAPSULE ORAL
Qty: 0 | Refills: 0 | DISCHARGE

## 2023-10-02 RX ORDER — FUROSEMIDE 40 MG
20 TABLET ORAL ONCE
Refills: 0 | Status: COMPLETED | OUTPATIENT
Start: 2023-10-02 | End: 2023-10-02

## 2023-10-02 RX ORDER — ATORVASTATIN CALCIUM 80 MG/1
20 TABLET, FILM COATED ORAL AT BEDTIME
Refills: 0 | Status: DISCONTINUED | OUTPATIENT
Start: 2023-10-02 | End: 2023-10-03

## 2023-10-02 RX ORDER — INFLUENZA VIRUS VACCINE 15; 15; 15; 15 UG/.5ML; UG/.5ML; UG/.5ML; UG/.5ML
0.7 SUSPENSION INTRAMUSCULAR ONCE
Refills: 0 | Status: DISCONTINUED | OUTPATIENT
Start: 2023-10-02 | End: 2023-10-03

## 2023-10-02 RX ORDER — ACETAMINOPHEN 500 MG
650 TABLET ORAL EVERY 6 HOURS
Refills: 0 | Status: DISCONTINUED | OUTPATIENT
Start: 2023-10-02 | End: 2023-10-03

## 2023-10-02 RX ORDER — SODIUM CHLORIDE 9 MG/ML
1000 INJECTION INTRAMUSCULAR; INTRAVENOUS; SUBCUTANEOUS
Refills: 0 | Status: DISCONTINUED | OUTPATIENT
Start: 2023-10-02 | End: 2023-10-03

## 2023-10-02 RX ORDER — CLOPIDOGREL BISULFATE 75 MG/1
75 TABLET, FILM COATED ORAL DAILY
Refills: 0 | Status: DISCONTINUED | OUTPATIENT
Start: 2023-10-03 | End: 2023-10-03

## 2023-10-02 RX ORDER — ALLOPURINOL 300 MG
100 TABLET ORAL DAILY
Refills: 0 | Status: DISCONTINUED | OUTPATIENT
Start: 2023-10-02 | End: 2023-10-03

## 2023-10-02 RX ORDER — MONTELUKAST 4 MG/1
1 TABLET, CHEWABLE ORAL
Qty: 0 | Refills: 0 | DISCHARGE

## 2023-10-02 RX ORDER — POTASSIUM CITRATE MONOHYDRATE 100 %
1 POWDER (GRAM) MISCELLANEOUS
Qty: 0 | Refills: 0 | DISCHARGE

## 2023-10-02 RX ADMIN — SODIUM CHLORIDE 75 MILLILITER(S): 9 INJECTION INTRAMUSCULAR; INTRAVENOUS; SUBCUTANEOUS at 11:51

## 2023-10-02 RX ADMIN — Medication 650 MILLIGRAM(S): at 12:50

## 2023-10-02 RX ADMIN — SODIUM CHLORIDE 75 MILLILITER(S): 9 INJECTION INTRAMUSCULAR; INTRAVENOUS; SUBCUTANEOUS at 09:34

## 2023-10-02 RX ADMIN — SODIUM CHLORIDE 8.33 MILLILITER(S): 9 INJECTION INTRAMUSCULAR; INTRAVENOUS; SUBCUTANEOUS at 09:34

## 2023-10-02 RX ADMIN — Medication 20 MILLIGRAM(S): at 19:11

## 2023-10-02 RX ADMIN — Medication 0.38 GRAM(S): at 18:21

## 2023-10-02 RX ADMIN — VALSARTAN 160 MILLIGRAM(S): 80 TABLET ORAL at 18:22

## 2023-10-02 RX ADMIN — Medication 650 MILLIGRAM(S): at 11:50

## 2023-10-02 NOTE — DISCHARGE NOTE PROVIDER - NSDCMRMEDTOKEN_GEN_ALL_CORE_FT
allopurinol 100 mg oral tablet: 1 tab(s) orally once a day  amLODIPine 2.5 mg oral tablet: 1 tab(s) orally once a day  Apriso 0.375 g oral capsule, extended release: 1 cap(s) orally 2 times a day  Crestor 5 mg oral tablet: 1 tab(s) orally once a day (at bedtime)  Crestor 5 mg oral tablet: 1 tab(s) orally every other day (at bedtime)  Potassium Chloride (Eqv-Klor-Con 10) 10 mEq oral tablet, extended release: 2 tab(s) orally once a day  valsartan 160 mg oral capsule: orally 2 times a day  Vitamin B-12 100 mcg oral tablet: 1 tab(s) orally once a day   acetaminophen 325 mg oral tablet: 2 tab(s) orally every 6 hours As needed Mild Pain (1 - 3)  allopurinol 100 mg oral tablet: 1 tab(s) orally once a day  amLODIPine 2.5 mg oral tablet: 1 tab(s) orally once a day  Apriso 0.375 g oral capsule, extended release: 1 cap(s) orally 2 times a day  aspirin 81 mg oral delayed release tablet: 1 tab(s) orally once a day  clopidogrel 75 mg oral tablet: 1 tab(s) orally once a day  Crestor 5 mg oral tablet: 1 tab(s) orally once a day (at bedtime)  Potassium Chloride (Eqv-Klor-Con 10) 10 mEq oral tablet, extended release: 2 tab(s) orally once a day  valsartan 160 mg oral capsule: orally 2 times a day  Vitamin B-12 100 mcg oral tablet: 1 tab(s) orally once a day

## 2023-10-02 NOTE — DISCHARGE NOTE PROVIDER - NSDCFUSCHEDAPPT_GEN_ALL_CORE_FT
[facility] Physician Formerly [facility], later [facility]  [id number]  [facility]  Scheduled Appointment: 10/09/2023    Ady Navas  [facility] Physician [facility]  [address]  Scheduled Appointment: 10/23/2023

## 2023-10-02 NOTE — PATIENT PROFILE ADULT - NSPROGENPREVTRANSF_GEN_A_NUR
----- Message from Lizandro Swift MD sent at 1/10/2023  2:36 PM CST -----  Please call the pharmacy to stop allopurinol order.     no

## 2023-10-02 NOTE — DISCHARGE NOTE PROVIDER - NSDCCPTREATMENT_GEN_ALL_CORE_FT
PRINCIPAL PROCEDURE  Procedure: Placement of coronary artery stent  Findings and Treatment: One stent to the LAD. Continue DAPT with aspirin, Plavix

## 2023-10-02 NOTE — H&P CARDIOLOGY - HISTORY OF PRESENT ILLNESS
79 yo M w pmh of HTN, HLD, CKD ( baseline Cr 1.2-1.5), kidney stones , IBD who presents for a left heart catheterization with Dr. Joshi. He states for the past few weeks he has been feeling chest pressure and shortness of breath with exertion. He went for a CTA showing extensive mixed plaque causes occlusive/nearly occlusive proximal and mid RCA stenosis. Mixed plaque within the LAD causes severe mid LAD stenosis. He currently denies chest pain, shortness of breath, or any other symptoms.     PCP: Dr. Irene  Cards: Dr. Joshi  Nephro: Dr. Keller

## 2023-10-02 NOTE — PATIENT PROFILE ADULT - FALL HARM RISK - UNIVERSAL INTERVENTIONS
Bed in lowest position, wheels locked, appropriate side rails in place/Call bell, personal items and telephone in reach/Instruct patient to call for assistance before getting out of bed or chair/Non-slip footwear when patient is out of bed/Oracle to call system/Physically safe environment - no spills, clutter or unnecessary equipment/Purposeful Proactive Rounding/Room/bathroom lighting operational, light cord in reach

## 2023-10-02 NOTE — DISCHARGE NOTE PROVIDER - NSDCCPCAREPLAN_GEN_ALL_CORE_FT
PRINCIPAL DISCHARGE DIAGNOSIS  Diagnosis: CAD (coronary artery disease)  Assessment and Plan of Treatment: Do not stop your aspirin or Plavix unless instructed to do so by your cardiologist, they help keep your stented arteries open.   No heavy lifting or pushing/pulling with procedure arm for 2 weeks. No driving for 2 days. You may shower 24 hours following the procedure but avoid baths/swimming for 1 week. Check your wrist site for bleeding and/or swelling daily following procedure and call your doctor immediately if it occurs or if you experience increased pain at the site. Follow up with your cardiologist in 1-2 weeks. You may call [facility] if you have any questions/concerns regarding your procedure [phone].      SECONDARY DISCHARGE DIAGNOSES  Diagnosis: HTN (hypertension)  Assessment and Plan of Treatment: Continue with your blood pressure medications; eat a heart healthy diet with low salt diet; exercise regularly (consult with your physician or cardiologist first); maintain a heart healthy weight; if you smoke - quit (A resource to help you stop smoking is the [facility] wiseri for [employer] Control – phone number [phone].); include healthy ways to manage stress. Continue to follow with your primary care physician or cardiologist.    Diagnosis: HLD (hyperlipidemia)  Assessment and Plan of Treatment: Continue with your cholesterol medications. Eat a heart healthy diet that is low in saturated fats and salt, and includes whole grains, fruits, vegetables and lean protein; exercise regularly (consult with your physician or cardiologist first); maintain a heart healthy weight; if you smoke - quit (A resource to help you stop smoking is the [facility] wiseri for Tobacco Control – phone number [phone].). Continue to follow with your primary physician or cardiologist.

## 2023-10-02 NOTE — DISCHARGE NOTE PROVIDER - CARE PROVIDER_API CALL
Zaid Joshi  Interventional Cardiology  [address] [place] [id number]  Phone: [phone]  Fax: [phone]  Follow Up Time: 2 weeks

## 2023-10-02 NOTE — H&P CARDIOLOGY - NSICDXPASTMEDICALHX_GEN_ALL_CORE_FT
RX denied, gabapentin 100mg already refilled on 4-, #90 with 1 refill.  
PAST MEDICAL HISTORY:  Cervicalgia     Elevated PSA     HLD (hyperlipidemia)     HTN (hypertension)     IBD (inflammatory bowel disease)

## 2023-10-02 NOTE — DISCHARGE NOTE PROVIDER - HOSPITAL COURSE
77 yo M w pmh of HTN, HLD, CKD ( baseline Cr 1.2-1.5), kidney stones , IBD who presents for a left heart catheterization with Dr. Joshi. He states for the past few weeks he has been feeling chest pressure and shortness of breath with exertion. He went for a CTA showing extensive mixed plaque causes occlusive/nearly occlusive proximal and mid RCA stenosis. Mixed plaque within the LAD causes severe mid LAD stenosis. He currently denies chest pain, shortness of breath, or any other symptoms.       s/p Stent LAD via right radial access -   IVF given pre and post procedure with Lasix X2   follow creatinine       PCP: Dr. Irene  Cards: Dr. Joshi  Nephro: Dr. Keller     77 yo M w pmh of HTN, HLD, CKD ( baseline Cr 1.2-1.5), kidney stones , IBD who presents for a left heart catheterization with Dr. Joshi. He states for the past few weeks he has been feeling chest pressure and shortness of breath with exertion. He went for a CTA showing extensive mixed plaque causes occlusive/nearly occlusive proximal and mid RCA stenosis. Mixed plaque within the LAD causes severe mid LAD stenosis. He currently denies chest pain, shortness of breath, or any other symptoms.     10/2 cardiac cath with one stent to the LAD via right radial access.  IVF given pre and post procedure with Lasix x2   follow creatinine       PCP: Dr. Irene  Cards: Dr. Joshi  Nephro: Dr. Keller

## 2023-10-02 NOTE — H&P CARDIOLOGY - NSICDXFAMILYHX_GEN_ALL_CORE_FT
FAMILY HISTORY:  Father  Still living? Unknown  Family history of lung cancer, Age at diagnosis: Age Unknown    Mother  Still living? Unknown  Family history of dementia, Age at diagnosis: Age Unknown

## 2023-10-03 ENCOUNTER — TRANSCRIPTION ENCOUNTER (OUTPATIENT)
Age: 78
End: 2023-10-03

## 2023-10-03 VITALS
SYSTOLIC BLOOD PRESSURE: 140 MMHG | OXYGEN SATURATION: 98 % | DIASTOLIC BLOOD PRESSURE: 65 MMHG | TEMPERATURE: 98 F | HEART RATE: 61 BPM | RESPIRATION RATE: 16 BRPM

## 2023-10-03 DIAGNOSIS — I10 ESSENTIAL (PRIMARY) HYPERTENSION: ICD-10-CM

## 2023-10-03 DIAGNOSIS — I25.10 ATHEROSCLEROTIC HEART DISEASE OF NATIVE CORONARY ARTERY WITHOUT ANGINA PECTORIS: ICD-10-CM

## 2023-10-03 DIAGNOSIS — E78.5 HYPERLIPIDEMIA, UNSPECIFIED: ICD-10-CM

## 2023-10-03 LAB
ANION GAP SERPL CALC-SCNC: 12 MMOL/L — SIGNIFICANT CHANGE UP (ref 5–17)
BUN SERPL-MCNC: 26 MG/DL — HIGH (ref 7–23)
CALCIUM SERPL-MCNC: 9 MG/DL — SIGNIFICANT CHANGE UP (ref 8.4–10.5)
CHLORIDE SERPL-SCNC: 105 MMOL/L — SIGNIFICANT CHANGE UP (ref 96–108)
CO2 SERPL-SCNC: 22 MMOL/L — SIGNIFICANT CHANGE UP (ref 22–31)
CREAT SERPL-MCNC: 1.57 MG/DL — HIGH (ref 0.5–1.3)
EGFR: 45 ML/MIN/1.73M2 — LOW
GLUCOSE SERPL-MCNC: 143 MG/DL — HIGH (ref 70–99)
HCT VFR BLD CALC: 39.6 % — SIGNIFICANT CHANGE UP (ref 39–50)
HGB BLD-MCNC: 13.5 G/DL — SIGNIFICANT CHANGE UP (ref 13–17)
MCHC RBC-ENTMCNC: 29.2 PG — SIGNIFICANT CHANGE UP (ref 27–34)
MCHC RBC-ENTMCNC: 34.1 GM/DL — SIGNIFICANT CHANGE UP (ref 32–36)
MCV RBC AUTO: 85.5 FL — SIGNIFICANT CHANGE UP (ref 80–100)
NRBC # BLD: 0 /100 WBCS — SIGNIFICANT CHANGE UP (ref 0–0)
PLATELET # BLD AUTO: 162 K/UL — SIGNIFICANT CHANGE UP (ref 150–400)
POTASSIUM SERPL-MCNC: 4.1 MMOL/L — SIGNIFICANT CHANGE UP (ref 3.5–5.3)
POTASSIUM SERPL-SCNC: 4.1 MMOL/L — SIGNIFICANT CHANGE UP (ref 3.5–5.3)
RBC # BLD: 4.63 M/UL — SIGNIFICANT CHANGE UP (ref 4.2–5.8)
RBC # FLD: 12.9 % — SIGNIFICANT CHANGE UP (ref 10.3–14.5)
SODIUM SERPL-SCNC: 139 MMOL/L — SIGNIFICANT CHANGE UP (ref 135–145)
WBC # BLD: 5.99 K/UL — SIGNIFICANT CHANGE UP (ref 3.8–10.5)
WBC # FLD AUTO: 5.99 K/UL — SIGNIFICANT CHANGE UP (ref 3.8–10.5)

## 2023-10-03 PROCEDURE — 80048 BASIC METABOLIC PNL TOTAL CA: CPT

## 2023-10-03 PROCEDURE — C9600: CPT | Mod: LD

## 2023-10-03 PROCEDURE — C1769: CPT

## 2023-10-03 PROCEDURE — C1725: CPT

## 2023-10-03 PROCEDURE — 93458 L HRT ARTERY/VENTRICLE ANGIO: CPT | Mod: 59

## 2023-10-03 PROCEDURE — C1874: CPT

## 2023-10-03 PROCEDURE — 85027 COMPLETE CBC AUTOMATED: CPT

## 2023-10-03 PROCEDURE — C1887: CPT

## 2023-10-03 PROCEDURE — C1894: CPT

## 2023-10-03 PROCEDURE — 36415 COLL VENOUS BLD VENIPUNCTURE: CPT

## 2023-10-03 PROCEDURE — 93005 ELECTROCARDIOGRAM TRACING: CPT

## 2023-10-03 RX ORDER — CLOPIDOGREL BISULFATE 75 MG/1
1 TABLET, FILM COATED ORAL
Qty: 90 | Refills: 0
Start: 2023-10-03 | End: 2023-12-31

## 2023-10-03 RX ORDER — ASPIRIN/CALCIUM CARB/MAGNESIUM 324 MG
1 TABLET ORAL
Qty: 90 | Refills: 3
Start: 2023-10-03 | End: 2024-09-26

## 2023-10-03 RX ORDER — CLOPIDOGREL BISULFATE 75 MG/1
1 TABLET, FILM COATED ORAL
Qty: 90 | Refills: 3
Start: 2023-10-03 | End: 2024-09-26

## 2023-10-03 RX ORDER — ACETAMINOPHEN 500 MG
2 TABLET ORAL
Qty: 0 | Refills: 0 | DISCHARGE
Start: 2023-10-03

## 2023-10-03 RX ORDER — ASPIRIN 325 MG/1
1 TABLET, FILM COATED ORAL
Qty: 90 | Refills: 3 | DISCHARGE
Start: 2023-10-03 | End: 2024-09-26

## 2023-10-03 RX ORDER — CLOPIDOGREL BISULFATE 75 MG/1
1 TABLET, FILM COATED ORAL
Qty: 90 | Refills: 3 | DISCHARGE
Start: 2023-10-03 | End: 2024-09-26

## 2023-10-03 RX ORDER — ASPIRIN/CALCIUM CARB/MAGNESIUM 324 MG
1 TABLET ORAL
Qty: 0 | Refills: 0 | DISCHARGE
Start: 2023-10-03

## 2023-10-03 RX ADMIN — Medication 81 MILLIGRAM(S): at 04:52

## 2023-10-03 RX ADMIN — VALSARTAN 160 MILLIGRAM(S): 80 TABLET ORAL at 04:52

## 2023-10-03 RX ADMIN — CLOPIDOGREL BISULFATE 75 MILLIGRAM(S): 75 TABLET, FILM COATED ORAL at 04:52

## 2023-10-03 NOTE — DISCHARGE NOTE NURSING/CASE MANAGEMENT/SOCIAL WORK - PATIENT PORTAL LINK FT
You can access the FollowMyHealth Patient Portal offered by [facility] by registering at the following website: [email or web address]. By joining el?’s FollowMyHealth portal, you will also be able to view your health information using other applications (apps) compatible with our system.

## 2023-10-03 NOTE — PROGRESS NOTE ADULT - SUBJECTIVE AND OBJECTIVE BOX
HPI:  79 yo M w pmh of HTN, HLD, CKD ( baseline Cr 1.2-1.5), kidney stones , IBD who presents for a left heart catheterization with Dr. Joshi. He states for the past few weeks he has been feeling chest pressure and shortness of breath with exertion. He went for a CTA showing extensive mixed plaque causes occlusive/nearly occlusive proximal and mid RCA stenosis. Mixed plaque within the LAD causes severe mid LAD stenosis. He currently denies chest pain, shortness of breath, or any other symptoms.     PCP: Dr. Irene  Cards: Dr. Joshi  Nephro: Dr. Keller (02 Oct 2023 09:04)    Patient is a 78y old  Male who presents with a chief complaint of Chest pressure/ SOB  (02 Oct 2023 12:19)          Allergies    No Known Allergies    Intolerances        Medications:  acetaminophen     Tablet .. 650 milliGRAM(s) Oral every 6 hours PRN  allopurinol 100 milliGRAM(s) Oral daily  amLODIPine   Tablet 2.5 milliGRAM(s) Oral daily  aspirin enteric coated 81 milliGRAM(s) Oral daily  atorvastatin 20 milliGRAM(s) Oral at bedtime  clopidogrel Tablet 75 milliGRAM(s) Oral daily  influenza  Vaccine (HIGH DOSE) 0.7 milliLiter(s) IntraMuscular once  mesalamine ER (24-Hour) Capsule 0.375 Gram(s) Oral <User Schedule>  sodium chloride 0.9%. 1000 milliLiter(s) IV Continuous <Continuous>  sodium chloride 0.9%. 1000 milliLiter(s) IV Continuous <Continuous>  valsartan 160 milliGRAM(s) Oral two times a day      Vitals:  T(C): 36.8 (10-02-23 @ 20:30), Max: 37.1 (10-02-23 @ 08:39)  HR: 59 (10-02-23 @ 20:30) (53 - 78)  BP: 131/73 (10-02-23 @ 20:30) (117/65 - 178/90)  BP(mean): 88 (10-02-23 @ 20:30) (77 - 109)  RR: 16 (10-02-23 @ 20:30) (12 - 21)  SpO2: 100% (10-02-23 @ 20:30) (97% - 100%)  Wt(kg): --  Daily Height in cm: 167.64 (02 Oct 2023 09:04)    Daily Weight in k.9 (02 Oct 2023 09:04)  I&O's Summary    02 Oct 2023 07:01  -  03 Oct 2023 01:50  --------------------------------------------------------  IN: 480 mL / OUT: 1200 mL / NET: -720 mL          Physical Exam:  Appearance: Normal  Eyes: PERRL, EOMI  HENT: Normal oral muscosa, NC/AT  Cardiovascular: S1S2, RRR, No M/R/G, no JVD, No Lower extremity edema  Procedural Access Site: No hematoma, Non-tender to palpation, 2+ pulse, No bruit, No Ecchymosis  Respiratory: Clear to auscultation bilaterally  Gastrointestinal: Soft, Non tender, Normal Bowel Sounds  Musculoskeletal: No clubbing, No joint deformity   Neurologic: Non-focal  Lymphatic: No lymphadenopathy  Psychiatry: AAOx3, Mood & affect appropriate  Skin: No rashes, No ecchymoses, No cyanosis    10-02    141  |  103  |  24<H>  ----------------------------<  121<H>  4.6   |  25  |  1.67<H>    Ca    10.1      02 Oct 2023 09:09              Lipid panel   Hgb A1c                         15.7   6.79  )-----------( 213      ( 02 Oct 2023 09:09 )             47.2         ECG: SR 66 bpm

## 2023-10-04 ENCOUNTER — NON-APPOINTMENT (OUTPATIENT)
Age: 78
End: 2023-10-04

## 2023-10-09 ENCOUNTER — APPOINTMENT (OUTPATIENT)
Dept: PHARMACY | Facility: CLINIC | Age: 78
End: 2023-10-09
Payer: SELF-PAY

## 2023-10-09 ENCOUNTER — APPOINTMENT (OUTPATIENT)
Dept: ULTRASOUND IMAGING | Facility: IMAGING CENTER | Age: 78
End: 2023-10-09
Payer: COMMERCIAL

## 2023-10-09 ENCOUNTER — OUTPATIENT (OUTPATIENT)
Dept: OUTPATIENT SERVICES | Facility: HOSPITAL | Age: 78
LOS: 1 days | End: 2023-10-09
Payer: COMMERCIAL

## 2023-10-09 DIAGNOSIS — Z90.49 ACQUIRED ABSENCE OF OTHER SPECIFIED PARTS OF DIGESTIVE TRACT: Chronic | ICD-10-CM

## 2023-10-09 DIAGNOSIS — I10 ESSENTIAL (PRIMARY) HYPERTENSION: ICD-10-CM

## 2023-10-09 DIAGNOSIS — N18.31 CHRONIC KIDNEY DISEASE, STAGE 3A: ICD-10-CM

## 2023-10-09 PROCEDURE — 93975 VASCULAR STUDY: CPT

## 2023-10-09 PROCEDURE — V5090: CPT

## 2023-10-09 PROCEDURE — 93975 VASCULAR STUDY: CPT | Mod: 26

## 2023-10-09 RX ORDER — VALSARTAN 80 MG/1
0 TABLET ORAL
Refills: 0 | DISCHARGE

## 2023-10-09 RX ORDER — AMLODIPINE BESYLATE 2.5 MG/1
1 TABLET ORAL
Refills: 0 | DISCHARGE

## 2023-10-09 RX ORDER — ROSUVASTATIN CALCIUM 5 MG/1
1 TABLET ORAL
Refills: 0 | DISCHARGE

## 2023-10-09 RX ORDER — POTASSIUM CHLORIDE 20 MEQ
2 PACKET (EA) ORAL
Refills: 0 | DISCHARGE

## 2023-10-20 ENCOUNTER — APPOINTMENT (OUTPATIENT)
Dept: ULTRASOUND IMAGING | Facility: CLINIC | Age: 78
End: 2023-10-20
Payer: COMMERCIAL

## 2023-10-20 ENCOUNTER — OUTPATIENT (OUTPATIENT)
Dept: OUTPATIENT SERVICES | Facility: HOSPITAL | Age: 78
LOS: 1 days | End: 2023-10-20
Payer: COMMERCIAL

## 2023-10-20 DIAGNOSIS — K61.0 ANAL ABSCESS: Chronic | ICD-10-CM

## 2023-10-20 DIAGNOSIS — Z90.49 ACQUIRED ABSENCE OF OTHER SPECIFIED PARTS OF DIGESTIVE TRACT: Chronic | ICD-10-CM

## 2023-10-20 DIAGNOSIS — E78.5 HYPERLIPIDEMIA, UNSPECIFIED: ICD-10-CM

## 2023-10-20 PROCEDURE — 93880 EXTRACRANIAL BILAT STUDY: CPT

## 2023-10-20 PROCEDURE — 93880 EXTRACRANIAL BILAT STUDY: CPT | Mod: 26

## 2023-10-20 RX ORDER — VALSARTAN 160 MG/1
160 TABLET, COATED ORAL
Qty: 180 | Refills: 3 | Status: ACTIVE | COMMUNITY
Start: 2023-09-26 | End: 1900-01-01

## 2023-10-23 ENCOUNTER — APPOINTMENT (OUTPATIENT)
Dept: GASTROENTEROLOGY | Facility: CLINIC | Age: 78
End: 2023-10-23

## 2023-10-23 LAB — SARS-COV-2 N GENE NPH QL NAA+PROBE: NOT DETECTED

## 2023-10-27 ENCOUNTER — APPOINTMENT (OUTPATIENT)
Dept: CARDIOLOGY | Facility: CLINIC | Age: 78
End: 2023-10-27
Payer: COMMERCIAL

## 2023-10-27 ENCOUNTER — APPOINTMENT (OUTPATIENT)
Dept: PHARMACY | Facility: CLINIC | Age: 78
End: 2023-10-27

## 2023-10-27 ENCOUNTER — NON-APPOINTMENT (OUTPATIENT)
Age: 78
End: 2023-10-27

## 2023-10-27 VITALS
HEIGHT: 67 IN | DIASTOLIC BLOOD PRESSURE: 78 MMHG | SYSTOLIC BLOOD PRESSURE: 150 MMHG | OXYGEN SATURATION: 99 % | HEART RATE: 67 BPM

## 2023-10-27 VITALS — SYSTOLIC BLOOD PRESSURE: 138 MMHG | DIASTOLIC BLOOD PRESSURE: 75 MMHG

## 2023-10-27 PROCEDURE — 99204 OFFICE O/P NEW MOD 45 MIN: CPT

## 2023-10-27 PROCEDURE — G0404: CPT

## 2023-10-27 RX ORDER — CLOPIDOGREL BISULFATE 75 MG/1
75 TABLET, FILM COATED ORAL DAILY
Qty: 1 | Refills: 3 | Status: ACTIVE | COMMUNITY
Start: 2023-10-27 | End: 1900-01-01

## 2023-10-30 LAB
ALBUMIN SERPL ELPH-MCNC: 5 G/DL
ALP BLD-CCNC: 88 U/L
ALT SERPL-CCNC: 22 U/L
ANION GAP SERPL CALC-SCNC: 11 MMOL/L
AST SERPL-CCNC: 18 U/L
BILIRUB SERPL-MCNC: 0.4 MG/DL
BUN SERPL-MCNC: 30 MG/DL
CALCIUM SERPL-MCNC: 10 MG/DL
CHLORIDE SERPL-SCNC: 106 MMOL/L
CHOLEST SERPL-MCNC: 126 MG/DL
CO2 SERPL-SCNC: 26 MMOL/L
CREAT SERPL-MCNC: 1.63 MG/DL
EGFR: 43 ML/MIN/1.73M2
GLUCOSE SERPL-MCNC: 86 MG/DL
HCT VFR BLD CALC: 41.8 %
HDLC SERPL-MCNC: 32 MG/DL
HGB BLD-MCNC: 13.5 G/DL
LDLC SERPL CALC-MCNC: 42 MG/DL
MCHC RBC-ENTMCNC: 28.8 PG
MCHC RBC-ENTMCNC: 32.3 GM/DL
MCV RBC AUTO: 89.3 FL
NONHDLC SERPL-MCNC: 94 MG/DL
PLATELET # BLD AUTO: 206 K/UL
POTASSIUM SERPL-SCNC: 5.1 MMOL/L
PROT SERPL-MCNC: 7.1 G/DL
RBC # BLD: 4.68 M/UL
RBC # FLD: 13.1 %
SODIUM SERPL-SCNC: 143 MMOL/L
TRIGL SERPL-MCNC: 349 MG/DL
WBC # FLD AUTO: 6.78 K/UL

## 2023-10-30 RX ORDER — ICOSAPENT ETHYL 1 G/1
1 CAPSULE ORAL
Qty: 180 | Refills: 3 | Status: ACTIVE | COMMUNITY
Start: 2023-10-30 | End: 1900-01-01

## 2023-11-01 ENCOUNTER — APPOINTMENT (OUTPATIENT)
Dept: PHARMACY | Facility: CLINIC | Age: 78
End: 2023-11-01
Payer: SELF-PAY

## 2023-11-01 PROCEDURE — V5257A: CUSTOM | Mod: RT

## 2023-11-01 PROCEDURE — V5261A: CUSTOM | Mod: RT

## 2023-11-03 ENCOUNTER — OUTPATIENT (OUTPATIENT)
Dept: OUTPATIENT SERVICES | Facility: HOSPITAL | Age: 78
LOS: 1 days | End: 2023-11-03
Payer: COMMERCIAL

## 2023-11-03 ENCOUNTER — APPOINTMENT (OUTPATIENT)
Dept: CV DIAGNOSITCS | Facility: HOSPITAL | Age: 78
End: 2023-11-03

## 2023-11-03 DIAGNOSIS — I25.10 ATHEROSCLEROTIC HEART DISEASE OF NATIVE CORONARY ARTERY WITHOUT ANGINA PECTORIS: ICD-10-CM

## 2023-11-03 DIAGNOSIS — Z90.49 ACQUIRED ABSENCE OF OTHER SPECIFIED PARTS OF DIGESTIVE TRACT: Chronic | ICD-10-CM

## 2023-11-03 DIAGNOSIS — K61.0 ANAL ABSCESS: Chronic | ICD-10-CM

## 2023-11-03 PROCEDURE — 93306 TTE W/DOPPLER COMPLETE: CPT | Mod: 26

## 2023-11-06 ENCOUNTER — APPOINTMENT (OUTPATIENT)
Dept: PULMONOLOGY | Facility: CLINIC | Age: 78
End: 2023-11-06
Payer: COMMERCIAL

## 2023-11-06 VITALS
HEIGHT: 67 IN | BODY MASS INDEX: 29.66 KG/M2 | DIASTOLIC BLOOD PRESSURE: 80 MMHG | SYSTOLIC BLOOD PRESSURE: 130 MMHG | WEIGHT: 189 LBS | TEMPERATURE: 97.7 F | HEART RATE: 67 BPM | OXYGEN SATURATION: 97 %

## 2023-11-06 DIAGNOSIS — Z91.89 OTHER SPECIFIED PERSONAL RISK FACTORS, NOT ELSEWHERE CLASSIFIED: ICD-10-CM

## 2023-11-06 DIAGNOSIS — G47.33 OBSTRUCTIVE SLEEP APNEA (ADULT) (PEDIATRIC): ICD-10-CM

## 2023-11-06 PROCEDURE — 99204 OFFICE O/P NEW MOD 45 MIN: CPT

## 2023-11-07 ENCOUNTER — APPOINTMENT (OUTPATIENT)
Dept: SLEEP CENTER | Facility: CLINIC | Age: 78
End: 2023-11-07

## 2023-11-08 ENCOUNTER — APPOINTMENT (OUTPATIENT)
Dept: SLEEP CENTER | Facility: CLINIC | Age: 78
End: 2023-11-08
Payer: COMMERCIAL

## 2023-11-08 ENCOUNTER — OUTPATIENT (OUTPATIENT)
Dept: OUTPATIENT SERVICES | Facility: HOSPITAL | Age: 78
LOS: 1 days | End: 2023-11-08
Payer: COMMERCIAL

## 2023-11-08 DIAGNOSIS — K61.0 ANAL ABSCESS: Chronic | ICD-10-CM

## 2023-11-08 DIAGNOSIS — Z90.49 ACQUIRED ABSENCE OF OTHER SPECIFIED PARTS OF DIGESTIVE TRACT: Chronic | ICD-10-CM

## 2023-11-08 PROCEDURE — 95800 SLP STDY UNATTENDED: CPT

## 2023-11-08 PROCEDURE — 95800 SLP STDY UNATTENDED: CPT | Mod: 26

## 2023-11-13 DIAGNOSIS — G47.33 OBSTRUCTIVE SLEEP APNEA (ADULT) (PEDIATRIC): ICD-10-CM

## 2023-11-17 ENCOUNTER — APPOINTMENT (OUTPATIENT)
Dept: CARDIOLOGY | Facility: CLINIC | Age: 78
End: 2023-11-17

## 2023-11-20 ENCOUNTER — APPOINTMENT (OUTPATIENT)
Dept: CARDIOLOGY | Facility: CLINIC | Age: 78
End: 2023-11-20

## 2023-11-27 ENCOUNTER — RX RENEWAL (OUTPATIENT)
Age: 78
End: 2023-11-27

## 2023-12-08 ENCOUNTER — APPOINTMENT (OUTPATIENT)
Dept: SLEEP CENTER | Facility: CLINIC | Age: 78
End: 2023-12-08
Payer: COMMERCIAL

## 2023-12-08 ENCOUNTER — OUTPATIENT (OUTPATIENT)
Dept: OUTPATIENT SERVICES | Facility: HOSPITAL | Age: 78
LOS: 1 days | End: 2023-12-08
Payer: COMMERCIAL

## 2023-12-08 DIAGNOSIS — K61.0 ANAL ABSCESS: Chronic | ICD-10-CM

## 2023-12-08 DIAGNOSIS — Z90.49 ACQUIRED ABSENCE OF OTHER SPECIFIED PARTS OF DIGESTIVE TRACT: Chronic | ICD-10-CM

## 2023-12-08 PROCEDURE — 95800 SLP STDY UNATTENDED: CPT | Mod: 26

## 2023-12-08 PROCEDURE — 95800 SLP STDY UNATTENDED: CPT

## 2023-12-18 DIAGNOSIS — G47.33 OBSTRUCTIVE SLEEP APNEA (ADULT) (PEDIATRIC): ICD-10-CM

## 2024-01-08 ENCOUNTER — APPOINTMENT (OUTPATIENT)
Dept: CARDIOLOGY | Facility: CLINIC | Age: 79
End: 2024-01-08
Payer: COMMERCIAL

## 2024-01-08 VITALS
HEIGHT: 67 IN | WEIGHT: 189 LBS | HEART RATE: 62 BPM | BODY MASS INDEX: 29.66 KG/M2 | OXYGEN SATURATION: 97 % | DIASTOLIC BLOOD PRESSURE: 75 MMHG | SYSTOLIC BLOOD PRESSURE: 159 MMHG

## 2024-01-08 PROCEDURE — 99214 OFFICE O/P EST MOD 30 MIN: CPT

## 2024-01-08 PROCEDURE — G0404: CPT

## 2024-01-08 RX ORDER — METOPROLOL SUCCINATE 25 MG/1
25 TABLET, EXTENDED RELEASE ORAL DAILY
Qty: 30 | Refills: 3 | Status: ACTIVE | COMMUNITY
Start: 2023-10-27

## 2024-01-08 RX ORDER — AMLODIPINE BESYLATE 5 MG/1
5 TABLET ORAL DAILY
Qty: 90 | Refills: 3 | Status: ACTIVE | COMMUNITY
Start: 2023-09-26 | End: 1900-01-01

## 2024-01-08 NOTE — HISTORY OF PRESENT ILLNESS
[FreeTextEntry1] : 79 yo man presents for CAD followup.  HTN CKD HLD PreDM, A1c 5.8% IBD GINNY, uses a prosthesis  Recently diagnosed with symptomatic (SOB, chest pressure) CAD: 9/2023: CTA calcium score 657 (, , ); severe prox RCA and LAD disease  10/2023: cardiac cath (Dr. Joshi)  of RCA, R to L collaterals pLAD 99% PCI with LUIS F (one stent, 3.5 X 22)  10/2023: carotid u/s intimal thickening, no severe stenosis  10/2023: renal u/s no VICTORINO; normal size kidneys, bilateral  At his last visit with me in Oct 2023, the following issues were discussed: Hyperlipidemia (272.4) (E78.5) He has been on Crestor 5 mg for a while, pre PCI. Will recheck lipids today; will likely     need to increase statin dose given extensive CAD. Hypertension (401.9) (I10) H/o HTN; recently, amlodipine added to his ARB. BP still a bit elevated at home. Given his     recently diagnosed CAD, will add low dose bblocker for BP as well as CAD. Suggested     starting at 12.5 mg of Toprol for one week, then increase to 25 mg daily. Chronic kidney disease, stage 3a (585.3) (N18.31) Baseline creat ~1.5. Will recheck labs. Coronary artery disease involving native coronary artery of native heart, unspecified whether angina present (414.01) (I25.10) No h/o MI/CAD. Recently, however, symptoms led to coronary angiogram revealing      of RCA and pLAD stenosis requiring PCI. Doing better; on DAPT with no bleeding.     Continue with statin, ARB/Ca blocker; add bblocker. Check echo for LVEF, wall motion.     Diet, exercise, med compliance reviewed. All questions answered. Elevated blood sugar (790.29) (R73.9) Mildly elevated A1c. Will try to lose some weight; improve diet.  Labs Oct 2023: CBC normal tchol 126, trig 349, HDL 32, LDL 42 mg/dL BUN 30, creat 1.63; LFT's normal; K 5.1  Increased statin dose and added Vascepa BID.  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 No HF or angina. Tolerating meds well. No bleeding. Kept bblocker at 12.5 mg dose due to some lightheadedness. BP at home around 130-140 mmHg.   EKG today: Anterior T wave abnormalities No changes

## 2024-01-08 NOTE — PHYSICAL EXAM
[Well Developed] : well developed [Well Nourished] : well nourished [No Acute Distress] : no acute distress [Normal Conjunctiva] : normal conjunctiva [Normal Venous Pressure] : normal venous pressure [No Carotid Bruit] : no carotid bruit [Normal S1, S2] : normal S1, S2 [No Murmur] : no murmur [No Rub] : no rub [No Gallop] : no gallop [Clear Lung Fields] : clear lung fields [Good Air Entry] : good air entry [No Respiratory Distress] : no respiratory distress  [Soft] : abdomen soft [Non Tender] : non-tender [No Masses/organomegaly] : no masses/organomegaly [Normal Bowel Sounds] : normal bowel sounds [Normal Gait] : normal gait [No Edema] : no edema [No Cyanosis] : no cyanosis [No Clubbing] : no clubbing [No Varicosities] : no varicosities [No Rash] : no rash [No Skin Lesions] : no skin lesions [Moves all extremities] : moves all extremities [No Focal Deficits] : no focal deficits [Normal Speech] : normal speech [Alert and Oriented] : alert and oriented [Normal memory] : normal memory [de-identified] : BP repeat 155/75 mmHg [de-identified] : carotids 2+

## 2024-01-09 LAB
ALBUMIN SERPL ELPH-MCNC: 5 G/DL
ALP BLD-CCNC: 81 U/L
ALT SERPL-CCNC: 23 U/L
ANION GAP SERPL CALC-SCNC: 14 MMOL/L
AST SERPL-CCNC: 19 U/L
BILIRUB SERPL-MCNC: 0.2 MG/DL
BUN SERPL-MCNC: 30 MG/DL
CALCIUM SERPL-MCNC: 9.9 MG/DL
CHLORIDE SERPL-SCNC: 103 MMOL/L
CHOLEST SERPL-MCNC: 136 MG/DL
CO2 SERPL-SCNC: 24 MMOL/L
CREAT SERPL-MCNC: 1.57 MG/DL
EGFR: 45 ML/MIN/1.73M2
ESTIMATED AVERAGE GLUCOSE: 114 MG/DL
GLUCOSE SERPL-MCNC: 67 MG/DL
HBA1C MFR BLD HPLC: 5.6 %
HDLC SERPL-MCNC: 36 MG/DL
LDLC SERPL CALC-MCNC: 52 MG/DL
NONHDLC SERPL-MCNC: 100 MG/DL
POTASSIUM SERPL-SCNC: 4.7 MMOL/L
PROT SERPL-MCNC: 7.1 G/DL
SODIUM SERPL-SCNC: 141 MMOL/L
TRIGL SERPL-MCNC: 309 MG/DL

## 2024-01-10 LAB — APO LP(A) SERPL-MCNC: 16 NMOL/L

## 2024-01-26 ENCOUNTER — OUTPATIENT (OUTPATIENT)
Dept: OUTPATIENT SERVICES | Facility: HOSPITAL | Age: 79
LOS: 1 days | End: 2024-01-26
Payer: COMMERCIAL

## 2024-01-26 ENCOUNTER — APPOINTMENT (OUTPATIENT)
Dept: SLEEP CENTER | Facility: CLINIC | Age: 79
End: 2024-01-26
Payer: COMMERCIAL

## 2024-01-26 DIAGNOSIS — Z90.49 ACQUIRED ABSENCE OF OTHER SPECIFIED PARTS OF DIGESTIVE TRACT: Chronic | ICD-10-CM

## 2024-01-26 DIAGNOSIS — K61.0 ANAL ABSCESS: Chronic | ICD-10-CM

## 2024-01-26 PROCEDURE — G0400: CPT

## 2024-01-26 PROCEDURE — G0400: CPT | Mod: 26

## 2024-01-31 ENCOUNTER — NON-APPOINTMENT (OUTPATIENT)
Age: 79
End: 2024-01-31

## 2024-02-01 DIAGNOSIS — G47.33 OBSTRUCTIVE SLEEP APNEA (ADULT) (PEDIATRIC): ICD-10-CM

## 2024-02-21 NOTE — PATIENT PROFILE ADULT. - NSNUTRITIONRISK_GI_A_CORE
INSTRUCTIONS  NEXT APPOINTMENT: Please schedule annual complete physical (30 minutes) in 6 months   PLEASE TAKE THIS FORM TO CHECK-OUT WINDOW TO SCHEDULE NEXT VISIT.   PLEASE GET FASTING BLOODWORK DRAWN SOON.  Lab is on first floor in [address]. Hours Monday to Friday 6:30 AM to 4 PM AND Saturday 8-12.    Get annual COVID vaccine once a year. Can get at the same time as flu shot OR separate from other vaccines by at least 2 weeks.   START cholesyramine for diarrhea.  If not helping in a few weeks, will refer to GI.  [employer] online for glasses.  Medicare part D patients:  Get Shingrix shingles vaccine at pharmacy (such as [facility] or [facility]). Need second dose in 2-6 months. Medicare starts covering it in 2023.   RSV (Respiratory syncytial virus) causes colds but can also cause viral pneumonia. Those at risk of more severe infection are the very young and those over 60 AND have chronic medical conditions such as heart, lung, kidney, liver, or neuromuscular disease; diabetes, immunocompromise, frail, or live in nursing home. It is a single dose that provides coverage for at least 2 years. Side effects are pain at injection site, fatigue, aches and headache. May be severe enough to interrupt daily activities in 1-3% for a day or 2. Uncertain about Medicare coverage at this time.    No indicators present

## 2024-03-05 ENCOUNTER — APPOINTMENT (OUTPATIENT)
Dept: DERMATOLOGY | Facility: CLINIC | Age: 79
End: 2024-03-05
Payer: COMMERCIAL

## 2024-03-05 DIAGNOSIS — L24.9 IRRITANT CONTACT DERMATITIS, UNSPECIFIED CAUSE: ICD-10-CM

## 2024-03-05 PROCEDURE — 99204 OFFICE O/P NEW MOD 45 MIN: CPT

## 2024-03-05 RX ORDER — CLOBETASOL PROPIONATE 0.5 MG/G
0.05 OINTMENT TOPICAL TWICE DAILY
Qty: 1 | Refills: 2 | Status: ACTIVE | COMMUNITY
Start: 2024-03-05 | End: 1900-01-01

## 2024-03-05 NOTE — HISTORY OF PRESENT ILLNESS
[FreeTextEntry1] : rash on the L hand [de-identified] : 78M here for itchy rash on the L hand x weeks. No treatments tried.

## 2024-03-05 NOTE — PHYSICAL EXAM
[Alert] : alert [Oriented x 3] : ~L oriented x 3 [Well Nourished] : well nourished [Conjunctiva Non-injected] : conjunctiva non-injected [No Clubbing] : no clubbing [No Visual Lymphadenopathy] : no visual  lymphadenopathy [No Edema] : no edema [No Bromhidrosis] : no bromhidrosis [No Chromhidrosis] : no chromhidrosis [FreeTextEntry3] : Nummular erythematous patches on the L dorsal hand and lateral aspects of the digits

## 2024-05-08 ENCOUNTER — APPOINTMENT (OUTPATIENT)
Dept: PHARMACY | Facility: CLINIC | Age: 79
End: 2024-05-08
Payer: SELF-PAY

## 2024-05-08 PROCEDURE — V5299A: CUSTOM

## 2024-06-24 ENCOUNTER — NON-APPOINTMENT (OUTPATIENT)
Age: 79
End: 2024-06-24

## 2024-06-24 ENCOUNTER — APPOINTMENT (OUTPATIENT)
Dept: CARDIOLOGY | Facility: CLINIC | Age: 79
End: 2024-06-24
Payer: COMMERCIAL

## 2024-06-24 VITALS
HEIGHT: 67 IN | WEIGHT: 194 LBS | HEART RATE: 61 BPM | RESPIRATION RATE: 16 BRPM | TEMPERATURE: 98.5 F | SYSTOLIC BLOOD PRESSURE: 132 MMHG | BODY MASS INDEX: 30.45 KG/M2 | OXYGEN SATURATION: 96 % | DIASTOLIC BLOOD PRESSURE: 69 MMHG

## 2024-06-24 DIAGNOSIS — N18.31 CHRONIC KIDNEY DISEASE, STAGE 3A: ICD-10-CM

## 2024-06-24 DIAGNOSIS — R73.9 HYPERGLYCEMIA, UNSPECIFIED: ICD-10-CM

## 2024-06-24 DIAGNOSIS — E78.5 HYPERLIPIDEMIA, UNSPECIFIED: ICD-10-CM

## 2024-06-24 DIAGNOSIS — I10 ESSENTIAL (PRIMARY) HYPERTENSION: ICD-10-CM

## 2024-06-24 DIAGNOSIS — I25.10 ATHEROSCLEROTIC HEART DISEASE OF NATIVE CORONARY ARTERY W/OUT ANGINA PECTORIS: ICD-10-CM

## 2024-06-24 PROCEDURE — G0404: CPT

## 2024-06-24 PROCEDURE — 99214 OFFICE O/P EST MOD 30 MIN: CPT

## 2024-06-24 PROCEDURE — G2211 COMPLEX E/M VISIT ADD ON: CPT

## 2024-06-27 LAB
ALBUMIN SERPL ELPH-MCNC: 4.9 G/DL
ALP BLD-CCNC: 76 U/L
ALT SERPL-CCNC: 22 U/L
ANION GAP SERPL CALC-SCNC: 15 MMOL/L
AST SERPL-CCNC: 20 U/L
BILIRUB SERPL-MCNC: 0.5 MG/DL
BUN SERPL-MCNC: 27 MG/DL
CALCIUM SERPL-MCNC: 9.5 MG/DL
CHLORIDE SERPL-SCNC: 106 MMOL/L
CHOLEST SERPL-MCNC: 118 MG/DL
CO2 SERPL-SCNC: 21 MMOL/L
CREAT SERPL-MCNC: 1.75 MG/DL
EGFR: 39 ML/MIN/1.73M2
ESTIMATED AVERAGE GLUCOSE: 120 MG/DL
GLUCOSE SERPL-MCNC: 111 MG/DL
HBA1C MFR BLD HPLC: 5.8 %
HCT VFR BLD CALC: 39.6 %
HDLC SERPL-MCNC: 34 MG/DL
HGB BLD-MCNC: 13.2 G/DL
LDLC SERPL CALC-MCNC: 58 MG/DL
MCHC RBC-ENTMCNC: 29.3 PG
MCHC RBC-ENTMCNC: 33.3 GM/DL
MCV RBC AUTO: 88 FL
NONHDLC SERPL-MCNC: 84 MG/DL
PLATELET # BLD AUTO: 171 K/UL
POTASSIUM SERPL-SCNC: 4.3 MMOL/L
PROT SERPL-MCNC: 7.3 G/DL
RBC # BLD: 4.5 M/UL
RBC # FLD: 13.4 %
SODIUM SERPL-SCNC: 142 MMOL/L
TRIGL SERPL-MCNC: 151 MG/DL
WBC # FLD AUTO: 5.04 K/UL

## 2024-07-01 ENCOUNTER — RESULT REVIEW (OUTPATIENT)
Age: 79
End: 2024-07-01

## 2024-07-01 ENCOUNTER — OUTPATIENT (OUTPATIENT)
Dept: OUTPATIENT SERVICES | Facility: HOSPITAL | Age: 79
LOS: 1 days | End: 2024-07-01
Payer: COMMERCIAL

## 2024-07-01 ENCOUNTER — APPOINTMENT (OUTPATIENT)
Dept: CV DIAGNOSTICS | Facility: HOSPITAL | Age: 79
End: 2024-07-01

## 2024-07-01 DIAGNOSIS — I25.10 ATHEROSCLEROTIC HEART DISEASE OF NATIVE CORONARY ARTERY WITHOUT ANGINA PECTORIS: ICD-10-CM

## 2024-07-01 DIAGNOSIS — K61.0 ANAL ABSCESS: Chronic | ICD-10-CM

## 2024-07-01 PROCEDURE — 93018 CV STRESS TEST I&R ONLY: CPT | Mod: GC,MC

## 2024-07-01 PROCEDURE — 93016 CV STRESS TEST SUPVJ ONLY: CPT | Mod: GC,MC

## 2024-07-01 PROCEDURE — 78452 HT MUSCLE IMAGE SPECT MULT: CPT | Mod: 26,MC

## 2024-07-03 ENCOUNTER — OUTPATIENT (OUTPATIENT)
Dept: OUTPATIENT SERVICES | Facility: HOSPITAL | Age: 79
LOS: 1 days | End: 2024-07-03
Payer: COMMERCIAL

## 2024-07-03 ENCOUNTER — TRANSCRIPTION ENCOUNTER (OUTPATIENT)
Age: 79
End: 2024-07-03

## 2024-07-03 VITALS
WEIGHT: 190.04 LBS | SYSTOLIC BLOOD PRESSURE: 156 MMHG | HEART RATE: 66 BPM | DIASTOLIC BLOOD PRESSURE: 72 MMHG | TEMPERATURE: 98 F | OXYGEN SATURATION: 97 % | RESPIRATION RATE: 15 BRPM | HEIGHT: 67 IN

## 2024-07-03 VITALS
SYSTOLIC BLOOD PRESSURE: 132 MMHG | TEMPERATURE: 98 F | DIASTOLIC BLOOD PRESSURE: 66 MMHG | HEART RATE: 54 BPM | RESPIRATION RATE: 16 BRPM | OXYGEN SATURATION: 95 %

## 2024-07-03 DIAGNOSIS — K61.0 ANAL ABSCESS: Chronic | ICD-10-CM

## 2024-07-03 DIAGNOSIS — R94.39 ABNORMAL RESULT OF OTHER CARDIOVASCULAR FUNCTION STUDY: ICD-10-CM

## 2024-07-03 DIAGNOSIS — Z90.49 ACQUIRED ABSENCE OF OTHER SPECIFIED PARTS OF DIGESTIVE TRACT: Chronic | ICD-10-CM

## 2024-07-03 LAB
ANION GAP SERPL CALC-SCNC: 15 MMOL/L — SIGNIFICANT CHANGE UP (ref 5–17)
BUN SERPL-MCNC: 32 MG/DL — HIGH (ref 7–23)
CALCIUM SERPL-MCNC: 10 MG/DL — SIGNIFICANT CHANGE UP (ref 8.4–10.5)
CHLORIDE SERPL-SCNC: 103 MMOL/L — SIGNIFICANT CHANGE UP (ref 96–108)
CO2 SERPL-SCNC: 21 MMOL/L — LOW (ref 22–31)
CREAT SERPL-MCNC: 1.74 MG/DL — HIGH (ref 0.5–1.3)
EGFR: 40 ML/MIN/1.73M2 — LOW
GLUCOSE SERPL-MCNC: 123 MG/DL — HIGH (ref 70–99)
HCT VFR BLD CALC: 40.1 % — SIGNIFICANT CHANGE UP (ref 39–50)
HGB BLD-MCNC: 13.5 G/DL — SIGNIFICANT CHANGE UP (ref 13–17)
MAGNESIUM SERPL-MCNC: 2.2 MG/DL — SIGNIFICANT CHANGE UP (ref 1.6–2.6)
MCHC RBC-ENTMCNC: 29.3 PG — SIGNIFICANT CHANGE UP (ref 27–34)
MCHC RBC-ENTMCNC: 33.7 GM/DL — SIGNIFICANT CHANGE UP (ref 32–36)
MCV RBC AUTO: 87 FL — SIGNIFICANT CHANGE UP (ref 80–100)
NRBC # BLD: 0 /100 WBCS — SIGNIFICANT CHANGE UP (ref 0–0)
PLATELET # BLD AUTO: 199 K/UL — SIGNIFICANT CHANGE UP (ref 150–400)
POTASSIUM SERPL-MCNC: 4.6 MMOL/L — SIGNIFICANT CHANGE UP (ref 3.5–5.3)
POTASSIUM SERPL-SCNC: 4.6 MMOL/L — SIGNIFICANT CHANGE UP (ref 3.5–5.3)
RBC # BLD: 4.61 M/UL — SIGNIFICANT CHANGE UP (ref 4.2–5.8)
RBC # FLD: 13 % — SIGNIFICANT CHANGE UP (ref 10.3–14.5)
SODIUM SERPL-SCNC: 139 MMOL/L — SIGNIFICANT CHANGE UP (ref 135–145)
WBC # BLD: 6.25 K/UL — SIGNIFICANT CHANGE UP (ref 3.8–10.5)
WBC # FLD AUTO: 6.25 K/UL — SIGNIFICANT CHANGE UP (ref 3.8–10.5)

## 2024-07-03 PROCEDURE — C9600: CPT | Mod: LD

## 2024-07-03 PROCEDURE — 83735 ASSAY OF MAGNESIUM: CPT

## 2024-07-03 PROCEDURE — C1769: CPT

## 2024-07-03 PROCEDURE — 99152 MOD SED SAME PHYS/QHP 5/>YRS: CPT

## 2024-07-03 PROCEDURE — 93454 CORONARY ARTERY ANGIO S&I: CPT | Mod: 26,59

## 2024-07-03 PROCEDURE — C1874: CPT

## 2024-07-03 PROCEDURE — 93010 ELECTROCARDIOGRAM REPORT: CPT | Mod: 77

## 2024-07-03 PROCEDURE — 93454 CORONARY ARTERY ANGIO S&I: CPT | Mod: 59

## 2024-07-03 PROCEDURE — 85027 COMPLETE CBC AUTOMATED: CPT

## 2024-07-03 PROCEDURE — C1894: CPT

## 2024-07-03 PROCEDURE — C1887: CPT

## 2024-07-03 PROCEDURE — 93010 ELECTROCARDIOGRAM REPORT: CPT

## 2024-07-03 PROCEDURE — 80048 BASIC METABOLIC PNL TOTAL CA: CPT

## 2024-07-03 PROCEDURE — 36415 COLL VENOUS BLD VENIPUNCTURE: CPT

## 2024-07-03 PROCEDURE — 92928 PRQ TCAT PLMT NTRAC ST 1 LES: CPT | Mod: LD

## 2024-07-03 PROCEDURE — 93005 ELECTROCARDIOGRAM TRACING: CPT

## 2024-07-03 RX ORDER — ALLOPURINOL 300 MG/1
100 TABLET ORAL DAILY
Refills: 0 | Status: DISCONTINUED | OUTPATIENT
Start: 2024-07-03 | End: 2024-07-17

## 2024-07-03 RX ORDER — METOPROLOL TARTRATE 50 MG
25 TABLET ORAL DAILY
Refills: 0 | Status: DISCONTINUED | OUTPATIENT
Start: 2024-07-03 | End: 2024-07-17

## 2024-07-03 RX ORDER — ATORVASTATIN CALCIUM 20 MG/1
40 TABLET, FILM COATED ORAL AT BEDTIME
Refills: 0 | Status: DISCONTINUED | OUTPATIENT
Start: 2024-07-03 | End: 2024-07-17

## 2024-07-03 RX ORDER — SODIUM CHLORIDE 0.9 % (FLUSH) 0.9 %
1000 SYRINGE (ML) INJECTION
Refills: 0 | Status: DISCONTINUED | OUTPATIENT
Start: 2024-07-03 | End: 2024-07-17

## 2024-07-03 RX ORDER — ASPIRIN 325 MG/1
81 TABLET, FILM COATED ORAL DAILY
Refills: 0 | Status: DISCONTINUED | OUTPATIENT
Start: 2024-07-03 | End: 2024-07-17

## 2024-07-03 RX ORDER — SODIUM CHLORIDE 0.9 % (FLUSH) 0.9 %
250 SYRINGE (ML) INJECTION ONCE
Refills: 0 | Status: COMPLETED | OUTPATIENT
Start: 2024-07-03 | End: 2024-07-03

## 2024-07-03 RX ORDER — VALSARTAN 320 MG/1
160 TABLET ORAL
Refills: 0 | Status: DISCONTINUED | OUTPATIENT
Start: 2024-07-03 | End: 2024-07-17

## 2024-07-03 RX ORDER — ROSUVASTATIN CALCIUM 20 MG/1
1 TABLET ORAL
Refills: 0 | DISCHARGE

## 2024-07-03 RX ORDER — CLOPIDOGREL BISULFATE 75 MG/1
75 TABLET, FILM COATED ORAL DAILY
Refills: 0 | Status: DISCONTINUED | OUTPATIENT
Start: 2024-07-03 | End: 2024-07-17

## 2024-07-03 RX ORDER — AMLODIPINE BESYLATE 2.5 MG/1
5 TABLET ORAL
Refills: 0 | Status: DISCONTINUED | OUTPATIENT
Start: 2024-07-03 | End: 2024-07-17

## 2024-07-03 RX ORDER — METOPROLOL TARTRATE 50 MG
0.5 TABLET ORAL
Refills: 0 | DISCHARGE

## 2024-07-03 RX ORDER — ACETAMINOPHEN 325 MG
650 TABLET ORAL ONCE
Refills: 0 | Status: COMPLETED | OUTPATIENT
Start: 2024-07-03 | End: 2024-07-03

## 2024-07-03 RX ADMIN — Medication 100 MILLILITER(S): at 11:55

## 2024-07-03 RX ADMIN — Medication 650 MILLIGRAM(S): at 13:57

## 2024-07-03 RX ADMIN — Medication 75 MILLILITER(S): at 09:15

## 2024-07-03 RX ADMIN — Medication 250 MILLILITER(S): at 09:15

## 2024-07-05 PROBLEM — G47.33 OBSTRUCTIVE SLEEP APNEA (ADULT) (PEDIATRIC): Chronic | Status: ACTIVE | Noted: 2024-07-03

## 2024-07-05 PROBLEM — E66.9 OBESITY, UNSPECIFIED: Chronic | Status: ACTIVE | Noted: 2024-07-03

## 2024-07-08 ENCOUNTER — NON-APPOINTMENT (OUTPATIENT)
Age: 79
End: 2024-07-08

## 2024-07-08 ENCOUNTER — APPOINTMENT (OUTPATIENT)
Dept: CARDIOLOGY | Facility: CLINIC | Age: 79
End: 2024-07-08
Payer: COMMERCIAL

## 2024-07-08 VITALS
HEIGHT: 67 IN | DIASTOLIC BLOOD PRESSURE: 74 MMHG | OXYGEN SATURATION: 96 % | SYSTOLIC BLOOD PRESSURE: 143 MMHG | HEART RATE: 64 BPM | WEIGHT: 190 LBS | BODY MASS INDEX: 29.82 KG/M2

## 2024-07-08 DIAGNOSIS — I25.10 ATHEROSCLEROTIC HEART DISEASE OF NATIVE CORONARY ARTERY W/OUT ANGINA PECTORIS: ICD-10-CM

## 2024-07-08 DIAGNOSIS — R73.9 HYPERGLYCEMIA, UNSPECIFIED: ICD-10-CM

## 2024-07-08 PROCEDURE — G2211 COMPLEX E/M VISIT ADD ON: CPT

## 2024-07-08 PROCEDURE — 99213 OFFICE O/P EST LOW 20 MIN: CPT

## 2024-07-08 PROCEDURE — G0404: CPT

## 2024-07-10 ENCOUNTER — NON-APPOINTMENT (OUTPATIENT)
Age: 79
End: 2024-07-10

## 2024-07-11 ENCOUNTER — APPOINTMENT (OUTPATIENT)
Dept: CARDIOLOGY | Facility: CLINIC | Age: 79
End: 2024-07-11

## 2024-07-11 ENCOUNTER — NON-APPOINTMENT (OUTPATIENT)
Age: 79
End: 2024-07-11

## 2024-07-16 ENCOUNTER — APPOINTMENT (OUTPATIENT)
Dept: CARDIOLOGY | Facility: CLINIC | Age: 79
End: 2024-07-16
Payer: COMMERCIAL

## 2024-07-16 DIAGNOSIS — E78.5 HYPERLIPIDEMIA, UNSPECIFIED: ICD-10-CM

## 2024-07-16 DIAGNOSIS — R94.31 ABNORMAL ELECTROCARDIOGRAM [ECG] [EKG]: ICD-10-CM

## 2024-07-16 PROCEDURE — G2211 COMPLEX E/M VISIT ADD ON: CPT

## 2024-07-16 PROCEDURE — 99214 OFFICE O/P EST MOD 30 MIN: CPT

## 2024-07-22 ENCOUNTER — APPOINTMENT (OUTPATIENT)
Dept: NEPHROLOGY | Facility: CLINIC | Age: 79
End: 2024-07-22
Payer: COMMERCIAL

## 2024-07-22 ENCOUNTER — APPOINTMENT (OUTPATIENT)
Dept: CARDIOLOGY | Facility: CLINIC | Age: 79
End: 2024-07-22
Payer: COMMERCIAL

## 2024-07-22 ENCOUNTER — NON-APPOINTMENT (OUTPATIENT)
Age: 79
End: 2024-07-22

## 2024-07-22 VITALS
DIASTOLIC BLOOD PRESSURE: 62 MMHG | SYSTOLIC BLOOD PRESSURE: 130 MMHG | HEIGHT: 67 IN | WEIGHT: 190 LBS | BODY MASS INDEX: 29.82 KG/M2

## 2024-07-22 DIAGNOSIS — N18.32 CHRONIC KIDNEY DISEASE, STAGE 3B: ICD-10-CM

## 2024-07-22 DIAGNOSIS — N18.31 CHRONIC KIDNEY DISEASE, STAGE 3A: ICD-10-CM

## 2024-07-22 PROCEDURE — G2211 COMPLEX E/M VISIT ADD ON: CPT

## 2024-07-22 PROCEDURE — 93798 PHYS/QHP OP CAR RHAB W/ECG: CPT

## 2024-07-22 PROCEDURE — 99213 OFFICE O/P EST LOW 20 MIN: CPT

## 2024-07-22 NOTE — ASSESSMENT
[FreeTextEntry1] : s/p PCI to pLAD with II LUIS F 7/3/24, prior PCI in 2023 to pLAD. stable and appropriate to proceed with phase II cardiac rehabilitation at this time.

## 2024-07-22 NOTE — PLAN
[FreeTextEntry1] : Cardiac Rehabilitation Phase 2 ITP established by Dr. Dawson  Patient started today; plan: Patient will come on Mondays at 7am and on Wednesdays at 1:30 pm due to demanding work sched (x12 weeks/24 sessions).  See session report for details.

## 2024-07-22 NOTE — HISTORY OF PRESENT ILLNESS
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 Varela. He presents today via telephone for cardiac rehab initial intake assessment. He reports feeling well and denies focal complaints at time of telephone call. Dr. Lisa reports that in the Fall of 2023 he had a CTA with a high calcium score, subsequent LHC revealed  of RCA (R to L collaterals), and pLAD 99%, I LUIS F was placed at the time. 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 Most recently, he presented to his cardiologist (Dr. Beltre) with complaints of chest pressure while walking in the hot and humid weather, reported that symptoms were similar to when prior stent was placed. He had a stress test which suggested stenosis in the LAD region, and had a LHC on 7/3/24 that showed a patent stent, however had severe proximal and distal stent disease. II LUIS F were placed. He was started on colchicine (? inflammatory response), and his crestor was increased. He has since returned to work and denies focal complaints. Dr. Lisa has other pmhx pertinent for- CKD (3a baseline Cr. 1.5-1.7), pre-dm, hld and htn. He is eager to begin cardiac rehab and has embraced a heart healthy lifestyle.  7/22/24: Patient presents for session#1, denies complaints, states he walked ~ one mile this past weekend- no chest pain/pressure or other focal symptoms endorsed. He reports occasional right knee discomfort, none at present.

## 2024-07-22 NOTE — REVIEW OF SYSTEMS
[Fatigue] : no fatigue [Vision Problems] : no vision problems [Hearing Loss] : no hearing loss [Chest Pain] : no chest pain [Palpitations] : no palpitations [Lower Ext Edema] : no lower extremity edema [Shortness Of Breath] : no shortness of breath [Dyspnea on Exertion] : not dyspnea on exertion [Abdominal Pain] : no abdominal pain [Fainting] : no fainting [Unsteady Walk] : no ataxia

## 2024-07-22 NOTE — PHYSICAL EXAM
[No Acute Distress] : no acute distress [Well-Appearing] : well-appearing [No JVD] : no jugular venous distention [Supple] : supple [No Respiratory Distress] : no respiratory distress  [Clear to Auscultation] : lungs were clear to auscultation bilaterally [Normal Rate] : normal rate  [Regular Rhythm] : with a regular rhythm [Normal S1, S2] : normal S1 and S2 [No Edema] : there was no peripheral edema [Soft] : abdomen soft [Non Tender] : non-tender [No CVA Tenderness] : no CVA  tenderness [No Spinal Tenderness] : no spinal tenderness [No Rash] : no rash [Coordination Grossly Intact] : coordination grossly intact [No Focal Deficits] : no focal deficits [Normal Gait] : normal gait [Normal Affect] : the affect was normal [Normal Insight/Judgement] : insight and judgment were intact

## 2024-07-23 NOTE — HISTORY OF PRESENT ILLNESS
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 He has history of IBS, remote kidney stone- uric acid.  He had gone for cath in Oct and stent to the LAD Then had seen cards recently and had another angio with 2 more stents to LAD Remains on [id number] BID and amlodipine 5mg His diet does not consist of too much red meat. More turkey, oatmeal. Statin increased. Going to cardiac rehab Denies NSAID

## 2024-07-23 NOTE — ASSESSMENT
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 Continue valsartan 160 BID Continue amlodipine 5mg at bedtime  Monitor BP at work with more reliable monitor.  Renal doppler to evaluate for renal artery stenosis neg barrie, renin neg Na restrict. Lifestyle modification.  Avoid NSAIDs On CPAP now CKD3B: renal function stable. Recommend SGLT. Need better hydration. Proteinuria: none

## 2024-07-24 ENCOUNTER — APPOINTMENT (OUTPATIENT)
Dept: CARDIOLOGY | Facility: CLINIC | Age: 79
End: 2024-07-24
Payer: COMMERCIAL

## 2024-07-24 LAB
25(OH)D3 SERPL-MCNC: 50.3 NG/ML
ALBUMIN SERPL ELPH-MCNC: 4.6 G/DL
ALP BLD-CCNC: 70 U/L
ALT SERPL-CCNC: 22 U/L
ANION GAP SERPL CALC-SCNC: 15 MMOL/L
APPEARANCE: CLEAR
AST SERPL-CCNC: 20 U/L
BACTERIA: NEGATIVE /HPF
BASOPHILS # BLD AUTO: 0.05 K/UL
BASOPHILS NFR BLD AUTO: 1 %
BILIRUB SERPL-MCNC: 0.3 MG/DL
BILIRUBIN URINE: NEGATIVE
BLOOD URINE: NEGATIVE
BUN SERPL-MCNC: 32 MG/DL
CALCIUM SERPL-MCNC: 9.2 MG/DL
CALCIUM SERPL-MCNC: 9.2 MG/DL
CAST: 0 /LPF
CHLORIDE SERPL-SCNC: 107 MMOL/L
CO2 SERPL-SCNC: 20 MMOL/L
COLOR: NORMAL
CREAT SERPL-MCNC: 1.72 MG/DL
CREAT SPEC-SCNC: 104 MG/DL
EGFR: 40 ML/MIN/1.73M2
EOSINOPHIL # BLD AUTO: 0.13 K/UL
EOSINOPHIL NFR BLD AUTO: 2.5 %
EPITHELIAL CELLS: 0 /HPF
GLUCOSE QUALITATIVE U: NEGATIVE MG/DL
GLUCOSE SERPL-MCNC: 104 MG/DL
HCT VFR BLD CALC: 37.8 %
HGB BLD-MCNC: 12.4 G/DL
IMM GRANULOCYTES NFR BLD AUTO: 0.4 %
KETONES URINE: NEGATIVE MG/DL
LEUKOCYTE ESTERASE URINE: NEGATIVE
LYMPHOCYTES # BLD AUTO: 1.04 K/UL
LYMPHOCYTES NFR BLD AUTO: 19.8 %
MAGNESIUM SERPL-MCNC: 2.4 MG/DL
MAN DIFF?: NORMAL
MCHC RBC-ENTMCNC: 29.2 PG
MCHC RBC-ENTMCNC: 32.8 GM/DL
MCV RBC AUTO: 88.9 FL
MICROALBUMIN 24H UR DL<=1MG/L-MCNC: 2.2 MG/DL
MICROALBUMIN/CREAT 24H UR-RTO: 21 MG/G
MICROSCOPIC-UA: NORMAL
MONOCYTES # BLD AUTO: 0.53 K/UL
MONOCYTES NFR BLD AUTO: 10.1 %
NEUTROPHILS # BLD AUTO: 3.48 K/UL
NEUTROPHILS NFR BLD AUTO: 66.2 %
NITRITE URINE: NEGATIVE
PARATHYROID HORMONE INTACT: 38 PG/ML
PH URINE: 6.5
PHOSPHATE SERPL-MCNC: 2.8 MG/DL
PLATELET # BLD AUTO: 192 K/UL
POTASSIUM SERPL-SCNC: 4.7 MMOL/L
PROT SERPL-MCNC: 6.7 G/DL
PROTEIN URINE: NORMAL MG/DL
RBC # BLD: 4.25 M/UL
RBC # FLD: 13.3 %
RED BLOOD CELLS URINE: 0 /HPF
SODIUM SERPL-SCNC: 143 MMOL/L
SPECIFIC GRAVITY URINE: 1.02
URATE SERPL-MCNC: 7.1 MG/DL
UROBILINOGEN URINE: 1 MG/DL
WBC # FLD AUTO: 5.25 K/UL
WHITE BLOOD CELLS URINE: 0 /HPF

## 2024-07-24 PROCEDURE — 93798 PHYS/QHP OP CAR RHAB W/ECG: CPT

## 2024-07-26 ENCOUNTER — APPOINTMENT (OUTPATIENT)
Dept: INTERNAL MEDICINE | Facility: CLINIC | Age: 79
End: 2024-07-26
Payer: COMMERCIAL

## 2024-07-26 VITALS
SYSTOLIC BLOOD PRESSURE: 132 MMHG | HEIGHT: 67 IN | BODY MASS INDEX: 29.82 KG/M2 | RESPIRATION RATE: 14 BRPM | WEIGHT: 190 LBS | HEART RATE: 66 BPM | DIASTOLIC BLOOD PRESSURE: 70 MMHG

## 2024-07-26 DIAGNOSIS — I25.10 ATHEROSCLEROTIC HEART DISEASE OF NATIVE CORONARY ARTERY W/OUT ANGINA PECTORIS: ICD-10-CM

## 2024-07-26 DIAGNOSIS — G47.33 OBSTRUCTIVE SLEEP APNEA (ADULT) (PEDIATRIC): ICD-10-CM

## 2024-07-26 DIAGNOSIS — Z12.5 ENCOUNTER FOR SCREENING FOR MALIGNANT NEOPLASM OF PROSTATE: ICD-10-CM

## 2024-07-26 DIAGNOSIS — I10 ESSENTIAL (PRIMARY) HYPERTENSION: ICD-10-CM

## 2024-07-26 DIAGNOSIS — Z00.00 ENCOUNTER FOR GENERAL ADULT MEDICAL EXAMINATION W/OUT ABNORMAL FINDINGS: ICD-10-CM

## 2024-07-26 DIAGNOSIS — K50.90 CROHN'S DISEASE, UNSPECIFIED, W/OUT COMPLICATIONS: ICD-10-CM

## 2024-07-26 DIAGNOSIS — Z84.89 FAMILY HISTORY OF OTHER SPECIFIED CONDITIONS: ICD-10-CM

## 2024-07-26 PROCEDURE — 99497 ADVNCD CARE PLAN 30 MIN: CPT

## 2024-07-26 PROCEDURE — 36415 COLL VENOUS BLD VENIPUNCTURE: CPT

## 2024-07-26 PROCEDURE — 99397 PER PM REEVAL EST PAT 65+ YR: CPT

## 2024-07-26 NOTE — HISTORY OF PRESENT ILLNESS
[FreeTextEntry1] : Well visit and fu for management of: ASCAD - just had stents - 2 to LAD Hyperlipidemia - on meds Hypertension - on meds Crohn's - on meds

## 2024-07-26 NOTE — ASSESSMENT
[FreeTextEntry1] : Pt for well exam I have advised the patient should consider getting the following vaccinations: RSV Pt with recent stents to LAD Recent labs reviewed. Bloods drawn in office. Will check PSA and TSH Continue meds

## 2024-07-26 NOTE — HEALTH RISK ASSESSMENT
[Good] : ~his/her~  mood as  good [2 - 4 times a month (2 pts)] : 2-4 times a month (2 points) [1 or 2 (0 pts)] : 1 or 2 (0 points) [Never (0 pts)] : Never (0 points) [No] : In the past 12 months have you used drugs other than those required for medical reasons? No [No falls in past year] : Patient reported no falls in the past year [PHQ-2 Negative - No further assessment needed] : PHQ-2 Negative - No further assessment needed [Never] : Never [YES] : Yes [None] : None [With Family] : lives with family [Employed] : employed [] :  [Feels Safe at Home] : Feels safe at home [Fully functional (bathing, dressing, toileting, transferring, walking, feeding)] : Fully functional (bathing, dressing, toileting, transferring, walking, feeding) [Fully functional (using the telephone, shopping, preparing meals, housekeeping, doing laundry, using] : Fully functional and needs no help or supervision to perform IADLs (using the telephone, shopping, preparing meals, housekeeping, doing laundry, using transportation, managing medications and managing finances) [Smoke Detector] : smoke detector [Seat Belt] :  uses seat belt [Sunscreen] : uses sunscreen [With Patient/Caregiver] : , with patient/caregiver [Designated Healthcare Proxy] : Designated healthcare proxy [Name: ___] : Health Care Proxy's Name: [unfilled]  [Time Spent: ___ minutes] : Time Spent: [unfilled] minutes [de-identified] : Cardiac Rehab [de-identified] : Low salt and fat [Change in mental status noted] : No change in mental status noted [Carbon Monoxide Detector] : no carbon monoxide detector [de-identified] : Ti get CO detectors [AdvancecareDate] : 07/24 [FreeTextEntry4] : Had long discussion with the patient. Discussed with pt the need for a health care proxy. Discussed who can be a proxy, forms given if needed, and the need for the proxy to know their wishes and to make sure they will comply. The proxy will need to have a copy in their home and the patient should have a copy. To make copy is in the house.  Wishes to be discussed.

## 2024-07-26 NOTE — PHYSICAL EXAM
[No Acute Distress] : no acute distress [Well Nourished] : well nourished [Well Developed] : well developed [Well-Appearing] : well-appearing [Normal Sclera/Conjunctiva] : normal sclera/conjunctiva [PERRL] : pupils equal round and reactive to light [EOMI] : extraocular movements intact [Normal Outer Ear/Nose] : the outer ears and nose were normal in appearance [Normal Oropharynx] : the oropharynx was normal [No JVD] : no jugular venous distention [No Lymphadenopathy] : no lymphadenopathy [Supple] : supple [Thyroid Normal, No Nodules] : the thyroid was normal and there were no nodules present [No Respiratory Distress] : no respiratory distress  [No Accessory Muscle Use] : no accessory muscle use [Clear to Auscultation] : lungs were clear to auscultation bilaterally [Normal Rate] : normal rate  [Regular Rhythm] : with a regular rhythm [Normal S1, S2] : normal S1 and S2 [No Murmur] : no murmur heard [No Carotid Bruits] : no carotid bruits [No Abdominal Bruit] : a ~M bruit was not heard ~T in the abdomen [No Varicosities] : no varicosities [Pedal Pulses Present] : the pedal pulses are present [No Edema] : there was no peripheral edema [No Palpable Aorta] : no palpable aorta [No Extremity Clubbing/Cyanosis] : no extremity clubbing/cyanosis [Soft] : abdomen soft [Non Tender] : non-tender [Non-distended] : non-distended [No Masses] : no abdominal mass palpated [No HSM] : no HSM [Normal Bowel Sounds] : normal bowel sounds [No Hernias] : no hernias [Normal Sphincter Tone] : normal sphincter tone [No Mass] : no mass [Penis Abnormality] : normal circumcised penis [Testes Tenderness] : no tenderness of the testes [Testes Mass (___cm)] : there were no testicular masses [Prostate Tenderness] : the prostate was not tender [No Prostate Nodules] : no prostate nodules [Prostate Size ___ (0-4)] : prostate size [unfilled] (scale: 0-4) [Normal Supraclavicular Nodes] : no supraclavicular lymphadenopathy [Normal Axillary Nodes] : no axillary lymphadenopathy [Normal Posterior Cervical Nodes] : no posterior cervical lymphadenopathy [Normal Anterior Cervical Nodes] : no anterior cervical lymphadenopathy [Normal Inguinal Nodes] : no inguinal lymphadenopathy [Normal Femoral Nodes] : no femoral lymphadenopathy [No CVA Tenderness] : no CVA  tenderness [No Spinal Tenderness] : no spinal tenderness [No Joint Swelling] : no joint swelling [Grossly Normal Strength/Tone] : grossly normal strength/tone [No Rash] : no rash [Coordination Grossly Intact] : coordination grossly intact [No Focal Deficits] : no focal deficits [Normal Gait] : normal gait [Deep Tendon Reflexes (DTR)] : deep tendon reflexes were 2+ and symmetric [Normal Affect] : the affect was normal [Alert and Oriented x3] : oriented to person, place, and time [Normal Insight/Judgement] : insight and judgment were intact [de-identified] : JUAN CARLOS

## 2024-07-26 NOTE — REVIEW OF SYSTEMS
[Hearing Loss] : hearing loss [Negative] : Heme/Lymph [Chest Pain] : no chest pain [Palpitations] : no palpitations [FreeTextEntry3] : last optho - 6 months [FreeTextEntry7] : Crohn's under control [FreeTextEntry8] : Nocturia x 2

## 2024-07-28 LAB
PSA SERPL-MCNC: 3.8 NG/ML
TSH SERPL-ACNC: 1.68 UIU/ML

## 2024-07-29 ENCOUNTER — APPOINTMENT (OUTPATIENT)
Dept: CARDIOLOGY | Facility: CLINIC | Age: 79
End: 2024-07-29
Payer: COMMERCIAL

## 2024-07-29 PROCEDURE — 93797 PHYS/QHP OP CAR RHAB WO ECG: CPT

## 2024-07-31 ENCOUNTER — APPOINTMENT (OUTPATIENT)
Dept: CARDIOLOGY | Facility: CLINIC | Age: 79
End: 2024-07-31
Payer: COMMERCIAL

## 2024-07-31 PROCEDURE — 93797 PHYS/QHP OP CAR RHAB WO ECG: CPT

## 2024-08-05 ENCOUNTER — APPOINTMENT (OUTPATIENT)
Dept: CARDIOLOGY | Facility: CLINIC | Age: 79
End: 2024-08-05

## 2024-08-05 PROCEDURE — 93797 PHYS/QHP OP CAR RHAB WO ECG: CPT

## 2024-08-07 ENCOUNTER — APPOINTMENT (OUTPATIENT)
Dept: CARDIOLOGY | Facility: CLINIC | Age: 79
End: 2024-08-07

## 2024-08-07 PROCEDURE — 93797 PHYS/QHP OP CAR RHAB WO ECG: CPT

## 2024-08-12 ENCOUNTER — APPOINTMENT (OUTPATIENT)
Dept: CARDIOLOGY | Facility: CLINIC | Age: 79
End: 2024-08-12
Payer: COMMERCIAL

## 2024-08-12 PROCEDURE — 93797 PHYS/QHP OP CAR RHAB WO ECG: CPT

## 2024-08-14 ENCOUNTER — APPOINTMENT (OUTPATIENT)
Dept: CARDIOLOGY | Facility: CLINIC | Age: 79
End: 2024-08-14
Payer: COMMERCIAL

## 2024-08-14 PROCEDURE — 93797 PHYS/QHP OP CAR RHAB WO ECG: CPT

## 2024-08-19 ENCOUNTER — APPOINTMENT (OUTPATIENT)
Dept: CARDIOLOGY | Facility: CLINIC | Age: 79
End: 2024-08-19
Payer: COMMERCIAL

## 2024-08-19 PROCEDURE — 93797 PHYS/QHP OP CAR RHAB WO ECG: CPT

## 2024-08-21 ENCOUNTER — APPOINTMENT (OUTPATIENT)
Dept: CARDIOLOGY | Facility: CLINIC | Age: 79
End: 2024-08-21
Payer: COMMERCIAL

## 2024-08-21 PROCEDURE — 93797 PHYS/QHP OP CAR RHAB WO ECG: CPT

## 2024-08-28 ENCOUNTER — APPOINTMENT (OUTPATIENT)
Dept: CARDIOLOGY | Facility: CLINIC | Age: 79
End: 2024-08-28
Payer: COMMERCIAL

## 2024-08-28 PROCEDURE — 93797 PHYS/QHP OP CAR RHAB WO ECG: CPT

## 2024-09-04 ENCOUNTER — APPOINTMENT (OUTPATIENT)
Dept: CARDIOLOGY | Facility: CLINIC | Age: 79
End: 2024-09-04
Payer: COMMERCIAL

## 2024-09-04 PROCEDURE — 93797 PHYS/QHP OP CAR RHAB WO ECG: CPT

## 2024-09-09 ENCOUNTER — APPOINTMENT (OUTPATIENT)
Dept: CARDIOLOGY | Facility: CLINIC | Age: 79
End: 2024-09-09
Payer: COMMERCIAL

## 2024-09-09 PROCEDURE — 93797 PHYS/QHP OP CAR RHAB WO ECG: CPT | Mod: NC

## 2024-09-11 ENCOUNTER — APPOINTMENT (OUTPATIENT)
Dept: CARDIOLOGY | Facility: CLINIC | Age: 79
End: 2024-09-11
Payer: COMMERCIAL

## 2024-09-11 PROCEDURE — 93797 PHYS/QHP OP CAR RHAB WO ECG: CPT

## 2024-09-16 ENCOUNTER — APPOINTMENT (OUTPATIENT)
Dept: CARDIOLOGY | Facility: CLINIC | Age: 79
End: 2024-09-16

## 2024-09-16 ENCOUNTER — APPOINTMENT (OUTPATIENT)
Dept: CARDIOLOGY | Facility: CLINIC | Age: 79
End: 2024-09-16
Payer: COMMERCIAL

## 2024-09-16 ENCOUNTER — NON-APPOINTMENT (OUTPATIENT)
Age: 79
End: 2024-09-16

## 2024-09-16 VITALS
SYSTOLIC BLOOD PRESSURE: 122 MMHG | DIASTOLIC BLOOD PRESSURE: 71 MMHG | HEIGHT: 67 IN | HEART RATE: 62 BPM | OXYGEN SATURATION: 96 % | WEIGHT: 191 LBS | BODY MASS INDEX: 29.98 KG/M2

## 2024-09-16 DIAGNOSIS — I10 ESSENTIAL (PRIMARY) HYPERTENSION: ICD-10-CM

## 2024-09-16 DIAGNOSIS — I25.10 ATHEROSCLEROTIC HEART DISEASE OF NATIVE CORONARY ARTERY W/OUT ANGINA PECTORIS: ICD-10-CM

## 2024-09-16 DIAGNOSIS — E78.5 HYPERLIPIDEMIA, UNSPECIFIED: ICD-10-CM

## 2024-09-16 DIAGNOSIS — G47.33 OBSTRUCTIVE SLEEP APNEA (ADULT) (PEDIATRIC): ICD-10-CM

## 2024-09-16 DIAGNOSIS — R73.9 HYPERGLYCEMIA, UNSPECIFIED: ICD-10-CM

## 2024-09-16 PROCEDURE — 93797 PHYS/QHP OP CAR RHAB WO ECG: CPT

## 2024-09-16 PROCEDURE — 99213 OFFICE O/P EST LOW 20 MIN: CPT | Mod: 25

## 2024-09-16 PROCEDURE — G2211 COMPLEX E/M VISIT ADD ON: CPT

## 2024-09-16 PROCEDURE — G0404: CPT

## 2024-09-16 NOTE — PHYSICAL EXAM

## 2024-09-16 NOTE — PHYSICAL EXAM

## 2024-09-16 NOTE — HISTORY OF PRESENT ILLNESS
[FreeTextEntry1] : 80 yo man presents for CAD follow-up.  CAD HTN CKD HLD PreDM, A1c 5.8% IBD GINNY, uses a prosthesis  2023: CTA calcium score 657 (, , ); severe prox RCA and LAD disease  10/2023: cardiac cath (Dr. Joshi)  of RCA, R to L collaterals pLAD 99% PCI with LUIS F (one stent, 3.5 X 22)  10/2023: carotid u/s intimal thickening, no severe stenosis  10/2023: renal u/s no VICTORINO; normal size kidneys, bilateral Echo 2023: Normal LV, EF 60%; gd 1 diastolic dysfunction LA / RA normal size Valves grossly normal  Labs Oct 2023: CBC normal tchol 126, trig 349, HDL 32, LDL 42 mg/dL BUN 30, creat 1.63; LFT's normal; K 5.1  Stress test summer 2024: Conclusions: 1. Myocardial Perfusion: Abnormal. Findings suggest stenoses in LAD and RCA distributions. Decreased inferior and inferoseptal systolic thickening may be due to ischemic stunning. 2. Qualitative Perfusion: - large-sized, mild to moderate defect(s) in the apical and anterior and anteroseptal walls that are reversible suggestive of ischemia. - large-sized, severe defect(s) in the inferior and inferoseptal wall that is reversible suggestive of ischemia. 3. Perfusion Findings: The best perfusion was in the lateral wall. 4. The left ventricle is normal in function and normal in size. The time activity curve suggests diastolic dysfunction.. The post stress left ventricular EF is 63 %. The stress end diastolic volume is 105 ml and systolic volume is 39 ml. 5. Normal right ventricular function. There is no right ventricular dilation. RVEF 65%; RVEDV 50 mL; RVESV 18 mL. 6. There is diminished inferior and inferoseptal systolic thickening with preserved wall motion. 7. Stress EC.0-2.5mm horizontal to downsloping ST depression in leads I, II, III, AVF, V5 and V6 starting at 2:50 minutes during stress at 118 bpm and persisting 7:50 minutes into recovery. 2.0 mm ST segment elevation in leads AVR and V1 starting at 2:50 minutes during stress at 118 bpm and persisting 7:50 minutes into recovery. 8. The Duke Treadmill Score is -14.00, which is consistent with high risk (< -10) for future cardiac events.  Cardiac Cath 7/3/2024 showed patent LAD stent with severe proximal and distal to stent disease ("candy wrapper" lesions). ? response to injury from prior stent. New (2) stents placed, end-angiographic result excellent. Discussed findings/plan with patient, son, wife (Piyush). Increased Crestor to 20 mg, added colchicine as well (anti-inflammatory).  Labs 2024: CBC normal A1c 5.8% tchol 118, trig 151, HDL 34, LDL 58 mg/dL; Lp(a) 16 creat 1.75; chem/LFT's norm  At his last visit with me in 2024, the following issues were discussed: Chronic kidney disease, stage 3a (585.3) (N18.31) Will see PCP/Nephrology for f/u. Coronary artery disease involving native coronary artery of native heart, unspecified whether angina present (414.01) (I25.10) Last winter, coronary angiogram revealed  of RCA and pLAD stenosis requiring PCI.     A few weeks ago, angina with exertion. Markedly abnormal stress test with LAD     ischemia. Repeat cardiac cath last week showed pre and post LAD stent stenosis,     marked. 2 additional stents placed; end angiographic result was excellent. Doing well.     Discussed possible benefits of adding colchicine, which he has started. Also, increased     Crestor to 20 mg daily. Remains on DAPT (indefinitely, for now), ARB/Ca blocker; keep     bblocker at low dose. Echo with normal LVEF, no wall motion abn. He is admittedly     not in good shape and will enter cardiac rehab and focus on exercise, weight, diet. Elevated blood sugar (790.29) (R73.9) Mildly elevated A1c. Will try to lose some weight; improve diet. Will see PCP shortly for     labs and followup. Hyperlipidemia (272.4) (E78.5) Continue Vascepa; increased statin at last visit. Hypertension (401.9) (I10) H/o HTN; on amlodipine + ARB. BP stable here; suggest checking at home.  Labs 2024: TSH normal creat 1.72  Since last visit, Dr. Lisa, enrolled in cardiac rehab and doing very well with it. Improved exercise tolerance. Weight stable.  Tolerated all meds well.  No chest pain, SOB.  EKG today: Sinus Rhythm  Anterolateral T wave abnormalities Leftward axis ABNORMAL

## 2024-09-18 ENCOUNTER — APPOINTMENT (OUTPATIENT)
Dept: CARDIOLOGY | Facility: CLINIC | Age: 79
End: 2024-09-18
Payer: COMMERCIAL

## 2024-09-18 PROCEDURE — 93797 PHYS/QHP OP CAR RHAB WO ECG: CPT

## 2024-09-23 ENCOUNTER — APPOINTMENT (OUTPATIENT)
Dept: CARDIOLOGY | Facility: CLINIC | Age: 79
End: 2024-09-23
Payer: COMMERCIAL

## 2024-09-23 PROCEDURE — 93797 PHYS/QHP OP CAR RHAB WO ECG: CPT

## 2024-09-25 ENCOUNTER — APPOINTMENT (OUTPATIENT)
Dept: CARDIOLOGY | Facility: CLINIC | Age: 79
End: 2024-09-25
Payer: COMMERCIAL

## 2024-09-25 PROCEDURE — 93797 PHYS/QHP OP CAR RHAB WO ECG: CPT

## 2024-10-05 ENCOUNTER — RX RENEWAL (OUTPATIENT)
Age: 79
End: 2024-10-05

## 2024-10-12 ENCOUNTER — RX RENEWAL (OUTPATIENT)
Age: 79
End: 2024-10-12

## 2024-10-14 ENCOUNTER — APPOINTMENT (OUTPATIENT)
Dept: CARDIOLOGY | Facility: CLINIC | Age: 79
End: 2024-10-14
Payer: COMMERCIAL

## 2024-10-14 PROCEDURE — 93797 PHYS/QHP OP CAR RHAB WO ECG: CPT

## 2024-10-16 ENCOUNTER — APPOINTMENT (OUTPATIENT)
Dept: CARDIOLOGY | Facility: CLINIC | Age: 79
End: 2024-10-16
Payer: COMMERCIAL

## 2024-10-16 PROCEDURE — 93797 PHYS/QHP OP CAR RHAB WO ECG: CPT

## 2024-10-18 RX ORDER — COLCHICINE 0.6 MG/1
0.6 TABLET ORAL DAILY
Qty: 90 | Refills: 3 | Status: ACTIVE | COMMUNITY
Start: 2024-10-15 | End: 1900-01-01

## 2024-10-19 ENCOUNTER — RX RENEWAL (OUTPATIENT)
Age: 79
End: 2024-10-19

## 2024-10-21 ENCOUNTER — RX RENEWAL (OUTPATIENT)
Age: 79
End: 2024-10-21

## 2024-10-21 ENCOUNTER — APPOINTMENT (OUTPATIENT)
Dept: CARDIOLOGY | Facility: CLINIC | Age: 79
End: 2024-10-21
Payer: COMMERCIAL

## 2024-10-21 PROCEDURE — 93797 PHYS/QHP OP CAR RHAB WO ECG: CPT

## 2024-10-23 ENCOUNTER — APPOINTMENT (OUTPATIENT)
Dept: CARDIOLOGY | Facility: CLINIC | Age: 79
End: 2024-10-23
Payer: COMMERCIAL

## 2024-10-23 PROCEDURE — 93797 PHYS/QHP OP CAR RHAB WO ECG: CPT

## 2024-10-28 ENCOUNTER — APPOINTMENT (OUTPATIENT)
Dept: CARDIOLOGY | Facility: CLINIC | Age: 79
End: 2024-10-28
Payer: COMMERCIAL

## 2024-10-28 PROCEDURE — 93797 PHYS/QHP OP CAR RHAB WO ECG: CPT

## 2024-10-30 ENCOUNTER — APPOINTMENT (OUTPATIENT)
Dept: CARDIOLOGY | Facility: CLINIC | Age: 79
End: 2024-10-30
Payer: COMMERCIAL

## 2024-10-30 PROCEDURE — 93797 PHYS/QHP OP CAR RHAB WO ECG: CPT

## 2024-11-04 ENCOUNTER — APPOINTMENT (OUTPATIENT)
Dept: CARDIOLOGY | Facility: CLINIC | Age: 79
End: 2024-11-04
Payer: COMMERCIAL

## 2024-11-04 PROCEDURE — 93797 PHYS/QHP OP CAR RHAB WO ECG: CPT

## 2024-11-06 ENCOUNTER — APPOINTMENT (OUTPATIENT)
Dept: CARDIOLOGY | Facility: CLINIC | Age: 79
End: 2024-11-06
Payer: COMMERCIAL

## 2024-11-06 PROCEDURE — 93797 PHYS/QHP OP CAR RHAB WO ECG: CPT

## 2024-11-11 ENCOUNTER — APPOINTMENT (OUTPATIENT)
Dept: CARDIOLOGY | Facility: CLINIC | Age: 79
End: 2024-11-11
Payer: COMMERCIAL

## 2024-11-11 PROCEDURE — 93797 PHYS/QHP OP CAR RHAB WO ECG: CPT

## 2024-11-13 ENCOUNTER — APPOINTMENT (OUTPATIENT)
Dept: CARDIOLOGY | Facility: CLINIC | Age: 79
End: 2024-11-13
Payer: COMMERCIAL

## 2024-11-13 PROCEDURE — 93797 PHYS/QHP OP CAR RHAB WO ECG: CPT

## 2024-11-18 ENCOUNTER — APPOINTMENT (OUTPATIENT)
Dept: CARDIOLOGY | Facility: CLINIC | Age: 79
End: 2024-11-18
Payer: COMMERCIAL

## 2024-11-18 PROCEDURE — 93797 PHYS/QHP OP CAR RHAB WO ECG: CPT

## 2024-11-20 ENCOUNTER — APPOINTMENT (OUTPATIENT)
Dept: CARDIOLOGY | Facility: CLINIC | Age: 79
End: 2024-11-20
Payer: COMMERCIAL

## 2024-11-20 PROCEDURE — 93797 PHYS/QHP OP CAR RHAB WO ECG: CPT

## 2024-11-25 ENCOUNTER — APPOINTMENT (OUTPATIENT)
Dept: CARDIOLOGY | Facility: CLINIC | Age: 79
End: 2024-11-25
Payer: COMMERCIAL

## 2024-11-25 PROCEDURE — 93797 PHYS/QHP OP CAR RHAB WO ECG: CPT

## 2024-11-27 ENCOUNTER — APPOINTMENT (OUTPATIENT)
Dept: CARDIOLOGY | Facility: CLINIC | Age: 79
End: 2024-11-27
Payer: COMMERCIAL

## 2024-11-27 PROCEDURE — 93797 PHYS/QHP OP CAR RHAB WO ECG: CPT

## 2024-12-02 ENCOUNTER — APPOINTMENT (OUTPATIENT)
Dept: CARDIOLOGY | Facility: CLINIC | Age: 79
End: 2024-12-02
Payer: COMMERCIAL

## 2024-12-02 PROCEDURE — 93797 PHYS/QHP OP CAR RHAB WO ECG: CPT

## 2024-12-04 ENCOUNTER — APPOINTMENT (OUTPATIENT)
Dept: CARDIOLOGY | Facility: CLINIC | Age: 79
End: 2024-12-04
Payer: COMMERCIAL

## 2024-12-04 PROCEDURE — 93797 PHYS/QHP OP CAR RHAB WO ECG: CPT

## 2024-12-09 ENCOUNTER — RX RENEWAL (OUTPATIENT)
Age: 79
End: 2024-12-09

## 2024-12-09 ENCOUNTER — APPOINTMENT (OUTPATIENT)
Dept: CARDIOLOGY | Facility: CLINIC | Age: 79
End: 2024-12-09
Payer: COMMERCIAL

## 2024-12-09 PROCEDURE — 93797 PHYS/QHP OP CAR RHAB WO ECG: CPT

## 2024-12-11 ENCOUNTER — APPOINTMENT (OUTPATIENT)
Dept: CARDIOLOGY | Facility: CLINIC | Age: 79
End: 2024-12-11
Payer: COMMERCIAL

## 2024-12-11 PROCEDURE — 93797 PHYS/QHP OP CAR RHAB WO ECG: CPT

## 2024-12-13 ENCOUNTER — RX RENEWAL (OUTPATIENT)
Age: 79
End: 2024-12-13

## 2024-12-16 ENCOUNTER — APPOINTMENT (OUTPATIENT)
Dept: CARDIOLOGY | Facility: CLINIC | Age: 79
End: 2024-12-16
Payer: COMMERCIAL

## 2024-12-16 PROCEDURE — 93797 PHYS/QHP OP CAR RHAB WO ECG: CPT

## 2024-12-18 ENCOUNTER — LABORATORY RESULT (OUTPATIENT)
Age: 79
End: 2024-12-18

## 2024-12-19 LAB
ALBUMIN SERPL ELPH-MCNC: 5 G/DL
ALP BLD-CCNC: 84 U/L
ALT SERPL-CCNC: 25 U/L
ANION GAP SERPL CALC-SCNC: 14 MMOL/L
APPEARANCE: CLEAR
AST SERPL-CCNC: 23 U/L
BASOPHILS # BLD AUTO: 0.06 K/UL
BASOPHILS NFR BLD AUTO: 0.7 %
BILIRUB SERPL-MCNC: 0.5 MG/DL
BILIRUBIN URINE: NEGATIVE
BLOOD URINE: NEGATIVE
BUN SERPL-MCNC: 30 MG/DL
CALCIUM SERPL-MCNC: 10 MG/DL
CHLORIDE SERPL-SCNC: 103 MMOL/L
CHOLEST SERPL-MCNC: 116 MG/DL
CO2 SERPL-SCNC: 25 MMOL/L
COLOR: YELLOW
CREAT SERPL-MCNC: 1.75 MG/DL
EGFR: 39 ML/MIN/1.73M2
EOSINOPHIL # BLD AUTO: 0.15 K/UL
EOSINOPHIL NFR BLD AUTO: 1.7 %
ESTIMATED AVERAGE GLUCOSE: 117 MG/DL
GLUCOSE QUALITATIVE U: NEGATIVE MG/DL
GLUCOSE SERPL-MCNC: 122 MG/DL
HBA1C MFR BLD HPLC: 5.7 %
HCT VFR BLD CALC: 42.6 %
HDLC SERPL-MCNC: 38 MG/DL
HGB BLD-MCNC: 13.5 G/DL
IMM GRANULOCYTES NFR BLD AUTO: 0.2 %
KETONES URINE: NEGATIVE MG/DL
LDLC SERPL CALC-MCNC: 49 MG/DL
LEUKOCYTE ESTERASE URINE: ABNORMAL
LYMPHOCYTES # BLD AUTO: 1.17 K/UL
LYMPHOCYTES NFR BLD AUTO: 13 %
MAN DIFF?: NORMAL
MCHC RBC-ENTMCNC: 29 PG
MCHC RBC-ENTMCNC: 31.7 G/DL
MCV RBC AUTO: 91.6 FL
MONOCYTES # BLD AUTO: 0.76 K/UL
MONOCYTES NFR BLD AUTO: 8.4 %
NEUTROPHILS # BLD AUTO: 6.85 K/UL
NEUTROPHILS NFR BLD AUTO: 76 %
NITRITE URINE: NEGATIVE
NONHDLC SERPL-MCNC: 79 MG/DL
PH URINE: 5.5
PLATELET # BLD AUTO: 208 K/UL
POTASSIUM SERPL-SCNC: 4.9 MMOL/L
PROT SERPL-MCNC: 7.5 G/DL
PROTEIN URINE: NEGATIVE MG/DL
PSA SERPL-MCNC: 4 NG/ML
RBC # BLD: 4.65 M/UL
RBC # FLD: 14 %
SODIUM SERPL-SCNC: 141 MMOL/L
SPECIFIC GRAVITY URINE: 1.02
TRIGL SERPL-MCNC: 177 MG/DL
TSH SERPL-ACNC: 2.65 UIU/ML
URATE SERPL-MCNC: 7 MG/DL
UROBILINOGEN URINE: 0.2 MG/DL
WBC # FLD AUTO: 9.01 K/UL

## 2024-12-26 ENCOUNTER — RX RENEWAL (OUTPATIENT)
Age: 79
End: 2024-12-26

## 2025-01-06 ENCOUNTER — APPOINTMENT (OUTPATIENT)
Dept: INTERNAL MEDICINE | Facility: CLINIC | Age: 80
End: 2025-01-06
Payer: COMMERCIAL

## 2025-01-06 ENCOUNTER — NON-APPOINTMENT (OUTPATIENT)
Age: 80
End: 2025-01-06

## 2025-01-06 VITALS
HEIGHT: 66.5 IN | WEIGHT: 186 LBS | HEART RATE: 66 BPM | BODY MASS INDEX: 29.54 KG/M2 | RESPIRATION RATE: 14 BRPM | SYSTOLIC BLOOD PRESSURE: 130 MMHG | DIASTOLIC BLOOD PRESSURE: 70 MMHG

## 2025-01-06 DIAGNOSIS — K50.90 CROHN'S DISEASE, UNSPECIFIED, W/OUT COMPLICATIONS: ICD-10-CM

## 2025-01-06 DIAGNOSIS — R73.03 PREDIABETES.: ICD-10-CM

## 2025-01-06 DIAGNOSIS — I25.10 ATHEROSCLEROTIC HEART DISEASE OF NATIVE CORONARY ARTERY W/OUT ANGINA PECTORIS: ICD-10-CM

## 2025-01-06 DIAGNOSIS — Z00.00 ENCOUNTER FOR GENERAL ADULT MEDICAL EXAMINATION W/OUT ABNORMAL FINDINGS: ICD-10-CM

## 2025-01-06 DIAGNOSIS — E78.5 HYPERLIPIDEMIA, UNSPECIFIED: ICD-10-CM

## 2025-01-06 DIAGNOSIS — I10 ESSENTIAL (PRIMARY) HYPERTENSION: ICD-10-CM

## 2025-01-06 PROCEDURE — 99497 ADVNCD CARE PLAN 30 MIN: CPT | Mod: 25

## 2025-01-06 PROCEDURE — 93000 ELECTROCARDIOGRAM COMPLETE: CPT

## 2025-01-06 PROCEDURE — 99397 PER PM REEVAL EST PAT 65+ YR: CPT

## 2025-02-26 ENCOUNTER — NON-APPOINTMENT (OUTPATIENT)
Age: 80
End: 2025-02-26

## 2025-02-26 ENCOUNTER — APPOINTMENT (OUTPATIENT)
Dept: PHARMACY | Facility: CLINIC | Age: 80
End: 2025-02-26
Payer: SELF-PAY

## 2025-02-26 ENCOUNTER — APPOINTMENT (OUTPATIENT)
Dept: PHARMACY | Facility: CLINIC | Age: 80
End: 2025-02-26

## 2025-02-26 PROCEDURE — V5299A: CUSTOM

## 2025-02-27 ENCOUNTER — TRANSCRIPTION ENCOUNTER (OUTPATIENT)
Age: 80
End: 2025-02-27

## 2025-03-24 ENCOUNTER — NON-APPOINTMENT (OUTPATIENT)
Age: 80
End: 2025-03-24

## 2025-03-24 ENCOUNTER — APPOINTMENT (OUTPATIENT)
Dept: CARDIOLOGY | Facility: CLINIC | Age: 80
End: 2025-03-24
Payer: COMMERCIAL

## 2025-03-24 VITALS
DIASTOLIC BLOOD PRESSURE: 60 MMHG | WEIGHT: 189 LBS | SYSTOLIC BLOOD PRESSURE: 127 MMHG | HEIGHT: 66.5 IN | HEART RATE: 67 BPM | BODY MASS INDEX: 30.02 KG/M2 | OXYGEN SATURATION: 96 % | TEMPERATURE: 97.1 F

## 2025-03-24 DIAGNOSIS — I10 ESSENTIAL (PRIMARY) HYPERTENSION: ICD-10-CM

## 2025-03-24 DIAGNOSIS — I25.10 ATHEROSCLEROTIC HEART DISEASE OF NATIVE CORONARY ARTERY W/OUT ANGINA PECTORIS: ICD-10-CM

## 2025-03-24 DIAGNOSIS — G47.33 OBSTRUCTIVE SLEEP APNEA (ADULT) (PEDIATRIC): ICD-10-CM

## 2025-03-24 DIAGNOSIS — E78.5 HYPERLIPIDEMIA, UNSPECIFIED: ICD-10-CM

## 2025-03-24 DIAGNOSIS — R73.9 HYPERGLYCEMIA, UNSPECIFIED: ICD-10-CM

## 2025-03-24 PROCEDURE — 93000 ELECTROCARDIOGRAM COMPLETE: CPT

## 2025-03-24 PROCEDURE — 99214 OFFICE O/P EST MOD 30 MIN: CPT

## 2025-03-24 PROCEDURE — G2211 COMPLEX E/M VISIT ADD ON: CPT

## 2025-05-22 LAB
ALBUMIN SERPL ELPH-MCNC: 4.9 G/DL
ALP BLD-CCNC: 71 U/L
ALT SERPL-CCNC: 29 U/L
ANION GAP SERPL CALC-SCNC: 13 MMOL/L
AST SERPL-CCNC: 20 U/L
BILIRUB SERPL-MCNC: 0.5 MG/DL
BUN SERPL-MCNC: 26 MG/DL
CALCIUM SERPL-MCNC: 9.8 MG/DL
CHLORIDE SERPL-SCNC: 105 MMOL/L
CHOLEST SERPL-MCNC: 102 MG/DL
CO2 SERPL-SCNC: 25 MMOL/L
CREAT SERPL-MCNC: 1.68 MG/DL
EGFRCR SERPLBLD CKD-EPI 2021: 41 ML/MIN/1.73M2
ESTIMATED AVERAGE GLUCOSE: 126 MG/DL
GLUCOSE SERPL-MCNC: 118 MG/DL
HBA1C MFR BLD HPLC: 6 %
HCT VFR BLD CALC: 40.8 %
HDLC SERPL-MCNC: 33 MG/DL
HGB BLD-MCNC: 13.5 G/DL
LDLC SERPL-MCNC: 37 MG/DL
MCHC RBC-ENTMCNC: 28.7 PG
MCHC RBC-ENTMCNC: 33.1 G/DL
MCV RBC AUTO: 86.6 FL
NONHDLC SERPL-MCNC: 69 MG/DL
PLATELET # BLD AUTO: 189 K/UL
POTASSIUM SERPL-SCNC: 4.9 MMOL/L
PROT SERPL-MCNC: 7.2 G/DL
RBC # BLD: 4.71 M/UL
RBC # FLD: 13.2 %
SODIUM SERPL-SCNC: 143 MMOL/L
TRIGL SERPL-MCNC: 202 MG/DL
WBC # FLD AUTO: 6.08 K/UL

## 2025-07-03 ENCOUNTER — NON-APPOINTMENT (OUTPATIENT)
Age: 80
End: 2025-07-03

## 2025-07-18 ENCOUNTER — APPOINTMENT (OUTPATIENT)
Dept: INTERNAL MEDICINE | Facility: CLINIC | Age: 80
End: 2025-07-18
Payer: COMMERCIAL

## 2025-07-18 VITALS — WEIGHT: 189 LBS | BODY MASS INDEX: 30.02 KG/M2 | HEIGHT: 66.5 IN

## 2025-07-18 VITALS
HEART RATE: 68 BPM | SYSTOLIC BLOOD PRESSURE: 130 MMHG | BODY MASS INDEX: 29.89 KG/M2 | WEIGHT: 188 LBS | RESPIRATION RATE: 14 BRPM | DIASTOLIC BLOOD PRESSURE: 70 MMHG

## 2025-07-18 PROCEDURE — 99214 OFFICE O/P EST MOD 30 MIN: CPT

## 2025-07-18 PROCEDURE — G2211 COMPLEX E/M VISIT ADD ON: CPT

## 2025-08-04 ENCOUNTER — NON-APPOINTMENT (OUTPATIENT)
Age: 80
End: 2025-08-04

## 2025-08-04 ENCOUNTER — APPOINTMENT (OUTPATIENT)
Dept: CARDIOLOGY | Facility: CLINIC | Age: 80
End: 2025-08-04
Payer: COMMERCIAL

## 2025-08-04 VITALS
SYSTOLIC BLOOD PRESSURE: 117 MMHG | HEIGHT: 66.5 IN | DIASTOLIC BLOOD PRESSURE: 65 MMHG | BODY MASS INDEX: 29.86 KG/M2 | HEART RATE: 72 BPM | WEIGHT: 188 LBS | OXYGEN SATURATION: 96 %

## 2025-08-04 DIAGNOSIS — R73.9 HYPERGLYCEMIA, UNSPECIFIED: ICD-10-CM

## 2025-08-04 DIAGNOSIS — R94.31 ABNORMAL ELECTROCARDIOGRAM [ECG] [EKG]: ICD-10-CM

## 2025-08-04 DIAGNOSIS — I25.10 ATHEROSCLEROTIC HEART DISEASE OF NATIVE CORONARY ARTERY W/OUT ANGINA PECTORIS: ICD-10-CM

## 2025-08-04 DIAGNOSIS — I10 ESSENTIAL (PRIMARY) HYPERTENSION: ICD-10-CM

## 2025-08-04 DIAGNOSIS — G47.33 OBSTRUCTIVE SLEEP APNEA (ADULT) (PEDIATRIC): ICD-10-CM

## 2025-08-04 DIAGNOSIS — E78.5 HYPERLIPIDEMIA, UNSPECIFIED: ICD-10-CM

## 2025-08-04 PROCEDURE — 99214 OFFICE O/P EST MOD 30 MIN: CPT | Mod: 25

## 2025-08-04 PROCEDURE — 93000 ELECTROCARDIOGRAM COMPLETE: CPT
